# Patient Record
Sex: FEMALE | Race: OTHER | HISPANIC OR LATINO
[De-identification: names, ages, dates, MRNs, and addresses within clinical notes are randomized per-mention and may not be internally consistent; named-entity substitution may affect disease eponyms.]

---

## 2017-02-10 ENCOUNTER — APPOINTMENT (OUTPATIENT)
Dept: SURGERY | Facility: CLINIC | Age: 37
End: 2017-02-10

## 2017-02-10 ENCOUNTER — OUTPATIENT (OUTPATIENT)
Dept: OUTPATIENT SERVICES | Facility: HOSPITAL | Age: 37
LOS: 1 days | End: 2017-02-10
Payer: COMMERCIAL

## 2017-02-10 VITALS
TEMPERATURE: 97.5 F | OXYGEN SATURATION: 98 % | HEIGHT: 64.5 IN | BODY MASS INDEX: 30.52 KG/M2 | HEART RATE: 71 BPM | SYSTOLIC BLOOD PRESSURE: 99 MMHG | DIASTOLIC BLOOD PRESSURE: 63 MMHG | WEIGHT: 181 LBS

## 2017-02-10 DIAGNOSIS — Z98.89 OTHER SPECIFIED POSTPROCEDURAL STATES: Chronic | ICD-10-CM

## 2017-02-10 PROCEDURE — 71020: CPT | Mod: 26

## 2017-02-10 PROCEDURE — 71046 X-RAY EXAM CHEST 2 VIEWS: CPT

## 2017-02-16 ENCOUNTER — APPOINTMENT (OUTPATIENT)
Dept: SURGERY | Facility: HOSPITAL | Age: 37
End: 2017-02-16

## 2017-02-16 ENCOUNTER — INPATIENT (INPATIENT)
Facility: HOSPITAL | Age: 37
LOS: 0 days | Discharge: HOME | DRG: 355 | End: 2017-02-17
Attending: SURGERY | Admitting: SURGERY
Payer: COMMERCIAL

## 2017-02-16 VITALS
OXYGEN SATURATION: 100 % | DIASTOLIC BLOOD PRESSURE: 56 MMHG | HEIGHT: 64 IN | TEMPERATURE: 97 F | RESPIRATION RATE: 16 BRPM | WEIGHT: 182.98 LBS | HEART RATE: 73 BPM | SYSTOLIC BLOOD PRESSURE: 111 MMHG

## 2017-02-16 DIAGNOSIS — Z98.89 OTHER SPECIFIED POSTPROCEDURAL STATES: Chronic | ICD-10-CM

## 2017-02-16 PROCEDURE — 49652: CPT | Mod: GC

## 2017-02-16 PROCEDURE — S2900 ROBOTIC SURGICAL SYSTEM: CPT | Mod: NC

## 2017-02-16 RX ORDER — ONDANSETRON 8 MG/1
4 TABLET, FILM COATED ORAL EVERY 6 HOURS
Qty: 0 | Refills: 0 | Status: DISCONTINUED | OUTPATIENT
Start: 2017-02-17 | End: 2017-02-17

## 2017-02-16 RX ORDER — HYDROMORPHONE HYDROCHLORIDE 2 MG/ML
1 INJECTION INTRAMUSCULAR; INTRAVENOUS; SUBCUTANEOUS EVERY 4 HOURS
Qty: 0 | Refills: 0 | Status: DISCONTINUED | OUTPATIENT
Start: 2017-02-17 | End: 2017-02-17

## 2017-02-16 RX ORDER — SODIUM CHLORIDE 9 MG/ML
1000 INJECTION, SOLUTION INTRAVENOUS
Qty: 0 | Refills: 0 | Status: DISCONTINUED | OUTPATIENT
Start: 2017-02-16 | End: 2017-02-16

## 2017-02-16 RX ORDER — SODIUM CHLORIDE 9 MG/ML
1000 INJECTION, SOLUTION INTRAVENOUS
Qty: 0 | Refills: 0 | Status: DISCONTINUED | OUTPATIENT
Start: 2017-02-16 | End: 2017-02-17

## 2017-02-16 NOTE — ASU PREOP CHECKLIST - SELECT TESTS ORDERED
LMP-Jan.2017 pregnancy test- negative/CXR/BMP/CBC/INR/EKG CBC/BMP/INR/PT/PTT/CXR/EKG/LMP-Jan.2017 pregnancy test- negative

## 2017-02-16 NOTE — H&P ADULT. - HISTORY OF PRESENT ILLNESS
36F with recurrent umbilical hernia times two. Patient noticed recurrence after her recent pregnancy. She also desires a tubal ligation which will be done by GYN.

## 2017-02-17 ENCOUNTER — TRANSCRIPTION ENCOUNTER (OUTPATIENT)
Age: 37
End: 2017-02-17

## 2017-02-17 VITALS
OXYGEN SATURATION: 99 % | HEART RATE: 72 BPM | RESPIRATION RATE: 16 BRPM | SYSTOLIC BLOOD PRESSURE: 103 MMHG | DIASTOLIC BLOOD PRESSURE: 72 MMHG | TEMPERATURE: 97 F

## 2017-02-17 RX ORDER — OXYCODONE AND ACETAMINOPHEN 5; 325 MG/1; MG/1
1 TABLET ORAL
Qty: 15 | Refills: 0
Start: 2017-02-17

## 2017-02-17 RX ADMIN — HYDROMORPHONE HYDROCHLORIDE 1 MILLIGRAM(S): 2 INJECTION INTRAMUSCULAR; INTRAVENOUS; SUBCUTANEOUS at 06:34

## 2017-02-17 RX ADMIN — HYDROMORPHONE HYDROCHLORIDE 1 MILLIGRAM(S): 2 INJECTION INTRAMUSCULAR; INTRAVENOUS; SUBCUTANEOUS at 01:15

## 2017-02-17 RX ADMIN — SODIUM CHLORIDE 125 MILLILITER(S): 9 INJECTION, SOLUTION INTRAVENOUS at 06:27

## 2017-02-17 RX ADMIN — HYDROMORPHONE HYDROCHLORIDE 1 MILLIGRAM(S): 2 INJECTION INTRAMUSCULAR; INTRAVENOUS; SUBCUTANEOUS at 01:22

## 2017-02-17 RX ADMIN — HYDROMORPHONE HYDROCHLORIDE 1 MILLIGRAM(S): 2 INJECTION INTRAMUSCULAR; INTRAVENOUS; SUBCUTANEOUS at 07:03

## 2017-02-17 NOTE — BRIEF OPERATIVE NOTE - COMMENTS
Laparoscopic Tubal Ligation with the ligasure. No complications. Procedure followed by Laparoscopic umbilical hernia repair by Dr. Cee miller

## 2017-02-17 NOTE — DISCHARGE NOTE ADULT - CARE PLAN
Principal Discharge DX:	Hernia  Goal:	Recovery  Instructions for follow-up, activity and diet:	Follow up with  in 1 week. Call the office at  to schedule your appointment. You may shower; soap and water over incision sites. Do not scrub. Pat dry when done. No tub bathing or swimming until cleared. Keep incision sites out of the sun as scars will darken. No heavy lifting (>10lbs) or strenuous exercise.

## 2017-02-17 NOTE — DISCHARGE NOTE ADULT - HOSPITAL COURSE
36F with recurrent umbilical hernia times two. Patient noticed recurrence after her recent pregnancy. She also desires a tubal ligation which will be done by GYN. She underwent robotic umbilical hernia repair and b/l tubal ligation. She tolerated the procedure well. Her diet was advanced and she tolerated. She will be discharged home with follow up in the office with Dr Austin and her OBGYN.

## 2017-02-17 NOTE — PROGRESS NOTE ADULT - SUBJECTIVE AND OBJECTIVE BOX
Procedure: b/l tubal ligation, lap robotic ventral hernia repair w/ mesh    Diagnosis/Indication: ventral hernia     Surgeon: Geovanna    S: Pt has no complaints. Denies CP, SOB, BLAS, calf tenderness. Pain controlled with medication.    O:  T(C): 36.6, Max: 36.6 (02-17 @ 01:15)  T(F): 97.8, Max: 97.8 (02-17 @ 01:15)  HR: 75 (52 - 75)  BP: 109/75 (109/75 - 131/58)  RR: 16 (16 - 18)  SpO2: 97% (97% - 100%)  Wt(kg): --            Gen: NAD, resting comfortably in bed  C/V: NSR  Pulm: Nonlabored breathing, no respiratory distress  Abd: soft, NT/ND, no guarding or rebound, 3 lap sites CDI  Extrem: WWP, no calf edema, SCDs in place      A/P: 36yFemale s/p above procedure  Diet: CLD  IVF: lr@125  Pain/nausea control  DVT ppx: SCDs

## 2017-02-17 NOTE — DISCHARGE NOTE ADULT - PATIENT PORTAL LINK FT
“You can access the Follow[facility] Patient Portal, offered by [facility], by registering with the following website: [email or web address]”

## 2017-02-17 NOTE — PROGRESS NOTE ADULT - SUBJECTIVE AND OBJECTIVE BOX
O/N: POC WNL, passed TOV      SUBJECTIVE: Pt seen and examined at bedside. No overnight events.  Pain controlled. No f/c/n/v.     Vital Signs Last 24 Hrs  T(C): 36.9, Max: 36.9 (02-17 @ 06:25)  T(F): 98.5, Max: 98.5 (02-17 @ 06:25)  HR: 65 (52 - 75)  BP: 107/73 (93/60 - 131/58)  BP(mean): --  RR: 16 (16 - 18)  SpO2: 99% (97% - 100%)    PHYSICAL EXAM    Gen: NAD  Abd: Soft, NT/ND, incisions CDI    I&O's Detail    I & Os for current day (as of 17 Feb 2017 07:32)  =============================================  IN:    lactated ringers.: 1000 ml    Total IN: 1000 ml  ---------------------------------------------  OUT:    Voided: 500 ml    Total OUT: 500 ml  ---------------------------------------------  Total NET: 500 ml      LABS:                MEDICATIONS  (STANDING):  lactated ringers. 1000milliLiter(s) IV Continuous <Continuous>    MEDICATIONS  (PRN):  HYDROmorphone  Injectable 1milliGRAM(s) IV Push every 4 hours PRN Moderate Pain  ondansetron Injectable 4milliGRAM(s) IV Push every 6 hours PRN Nausea      RADIOLOGY & ADDITIONAL STUDIES:    ASSESSMENT AND PLAN

## 2017-02-17 NOTE — DISCHARGE NOTE ADULT - MEDICATION SUMMARY - MEDICATIONS TO TAKE
I will START or STAY ON the medications listed below when I get home from the hospital:    Percocet 5/325 oral tablet  -- 1 tab(s) by mouth every 6 hours, As Needed MDD:4  -- Caution federal law prohibits the transfer of this drug to any person other  than the person for whom it was prescribed.  May cause drowsiness.  Alcohol may intensify this effect.  Use care when operating dangerous machinery.  This prescription cannot be refilled.  This product contains acetaminophen.  Do not use  with any other product containing acetaminophen to prevent possible liver damage.  Using more of this medication than prescribed may cause serious breathing problems.    -- Indication: For Pain

## 2017-02-17 NOTE — DISCHARGE NOTE ADULT - PLAN OF CARE
Recovery Follow up with  in 1 week. Call the office at  to schedule your appointment. You may shower; soap and water over incision sites. Do not scrub. Pat dry when done. No tub bathing or swimming until cleared. Keep incision sites out of the sun as scars will darken. No heavy lifting (>10lbs) or strenuous exercise.

## 2017-02-21 ENCOUNTER — APPOINTMENT (OUTPATIENT)
Dept: BARIATRICS | Facility: CLINIC | Age: 37
End: 2017-02-21

## 2017-02-21 ENCOUNTER — APPOINTMENT (OUTPATIENT)
Dept: SURGERY | Facility: CLINIC | Age: 37
End: 2017-02-21

## 2017-02-21 VITALS
BODY MASS INDEX: 30.52 KG/M2 | SYSTOLIC BLOOD PRESSURE: 91 MMHG | HEART RATE: 62 BPM | HEIGHT: 64.5 IN | WEIGHT: 181 LBS | DIASTOLIC BLOOD PRESSURE: 60 MMHG | OXYGEN SATURATION: 98 %

## 2017-02-21 DIAGNOSIS — K43.9 VENTRAL HERNIA WITHOUT OBSTRUCTION OR GANGRENE: ICD-10-CM

## 2017-02-21 DIAGNOSIS — K42.9 UMBILICAL HERNIA WITHOUT OBSTRUCTION OR GANGRENE: ICD-10-CM

## 2017-02-21 DIAGNOSIS — Z30.2 ENCOUNTER FOR STERILIZATION: ICD-10-CM

## 2017-02-28 ENCOUNTER — APPOINTMENT (OUTPATIENT)
Dept: SURGERY | Facility: CLINIC | Age: 37
End: 2017-02-28

## 2017-02-28 VITALS
BODY MASS INDEX: 31.2 KG/M2 | OXYGEN SATURATION: 97 % | HEART RATE: 83 BPM | SYSTOLIC BLOOD PRESSURE: 107 MMHG | DIASTOLIC BLOOD PRESSURE: 67 MMHG | WEIGHT: 185 LBS | HEIGHT: 64.5 IN

## 2017-04-04 ENCOUNTER — APPOINTMENT (OUTPATIENT)
Dept: SURGERY | Facility: CLINIC | Age: 37
End: 2017-04-04

## 2017-04-04 VITALS
DIASTOLIC BLOOD PRESSURE: 68 MMHG | SYSTOLIC BLOOD PRESSURE: 100 MMHG | OXYGEN SATURATION: 98 % | HEIGHT: 64.5 IN | HEART RATE: 58 BPM | TEMPERATURE: 98 F | WEIGHT: 187 LBS | BODY MASS INDEX: 31.54 KG/M2

## 2017-04-04 DIAGNOSIS — Z87.19 OTHER SPECIFIED POSTPROCEDURAL STATES: ICD-10-CM

## 2017-04-04 DIAGNOSIS — K43.9 VENTRAL HERNIA W/OUT OBSTRUCTION OR GANGRENE: ICD-10-CM

## 2017-04-04 DIAGNOSIS — G89.18 OTHER ACUTE POSTPROCEDURAL PAIN: ICD-10-CM

## 2017-04-04 DIAGNOSIS — K42.9 UMBILICAL HERNIA W/OUT OBSTRUCTION OR GANGRENE: ICD-10-CM

## 2017-04-04 DIAGNOSIS — Z98.890 OTHER SPECIFIED POSTPROCEDURAL STATES: ICD-10-CM

## 2017-04-25 ENCOUNTER — APPOINTMENT (OUTPATIENT)
Dept: SURGERY | Facility: CLINIC | Age: 37
End: 2017-04-25

## 2017-06-06 ENCOUNTER — APPOINTMENT (OUTPATIENT)
Dept: SURGERY | Facility: CLINIC | Age: 37
End: 2017-06-06

## 2017-12-15 PROCEDURE — C1781: CPT

## 2017-12-15 PROCEDURE — S2900: CPT

## 2018-08-01 ENCOUNTER — APPOINTMENT (OUTPATIENT)
Dept: CT IMAGING | Facility: HOSPITAL | Age: 38
End: 2018-08-01

## 2018-08-01 ENCOUNTER — OUTPATIENT (OUTPATIENT)
Dept: OUTPATIENT SERVICES | Facility: HOSPITAL | Age: 38
LOS: 1 days | End: 2018-08-01
Payer: COMMERCIAL

## 2018-08-01 DIAGNOSIS — Z98.89 OTHER SPECIFIED POSTPROCEDURAL STATES: Chronic | ICD-10-CM

## 2018-08-01 PROCEDURE — 70450 CT HEAD/BRAIN W/O DYE: CPT | Mod: 26

## 2018-08-01 PROCEDURE — 70450 CT HEAD/BRAIN W/O DYE: CPT

## 2018-10-15 ENCOUNTER — APPOINTMENT (OUTPATIENT)
Dept: NEUROSURGERY | Facility: CLINIC | Age: 38
End: 2018-10-15

## 2018-10-29 ENCOUNTER — APPOINTMENT (OUTPATIENT)
Dept: NEUROSURGERY | Facility: CLINIC | Age: 38
End: 2018-10-29

## 2018-11-05 ENCOUNTER — APPOINTMENT (OUTPATIENT)
Dept: NEUROSURGERY | Facility: CLINIC | Age: 38
End: 2018-11-05
Payer: MEDICAID

## 2018-11-05 VITALS
HEIGHT: 64 IN | WEIGHT: 197 LBS | BODY MASS INDEX: 33.63 KG/M2 | OXYGEN SATURATION: 100 % | RESPIRATION RATE: 14 BRPM | SYSTOLIC BLOOD PRESSURE: 97 MMHG | HEART RATE: 72 BPM | DIASTOLIC BLOOD PRESSURE: 62 MMHG

## 2018-11-05 PROCEDURE — 99205 OFFICE O/P NEW HI 60 MIN: CPT

## 2019-04-01 ENCOUNTER — APPOINTMENT (OUTPATIENT)
Dept: MRI IMAGING | Facility: HOSPITAL | Age: 39
End: 2019-04-01
Payer: MEDICAID

## 2019-04-01 ENCOUNTER — OUTPATIENT (OUTPATIENT)
Dept: OUTPATIENT SERVICES | Facility: HOSPITAL | Age: 39
LOS: 1 days | End: 2019-04-01
Payer: COMMERCIAL

## 2019-04-01 DIAGNOSIS — Z98.89 OTHER SPECIFIED POSTPROCEDURAL STATES: Chronic | ICD-10-CM

## 2019-04-01 PROCEDURE — 72156 MRI NECK SPINE W/O & W/DYE: CPT

## 2019-04-01 PROCEDURE — 72156 MRI NECK SPINE W/O & W/DYE: CPT | Mod: 26

## 2019-04-01 PROCEDURE — 70553 MRI BRAIN STEM W/O & W/DYE: CPT | Mod: 26

## 2019-04-01 PROCEDURE — A9585: CPT

## 2019-04-01 PROCEDURE — 70553 MRI BRAIN STEM W/O & W/DYE: CPT

## 2019-04-08 ENCOUNTER — APPOINTMENT (OUTPATIENT)
Dept: NEUROSURGERY | Facility: CLINIC | Age: 39
End: 2019-04-08
Payer: MEDICAID

## 2019-04-08 VITALS
OXYGEN SATURATION: 98 % | BODY MASS INDEX: 31.58 KG/M2 | RESPIRATION RATE: 16 BRPM | WEIGHT: 185 LBS | HEIGHT: 64 IN | DIASTOLIC BLOOD PRESSURE: 70 MMHG | HEART RATE: 71 BPM | SYSTOLIC BLOOD PRESSURE: 109 MMHG

## 2019-04-08 PROCEDURE — 99215 OFFICE O/P EST HI 40 MIN: CPT

## 2019-04-08 NOTE — PHYSICAL EXAM
[General Appearance - Alert] : alert [General Appearance - In No Acute Distress] : in no acute distress [Oriented To Time, Place, And Person] : oriented to person, place, and time [Motor Tone] : muscle tone was normal in all four extremities [Motor Strength] : muscle strength was normal in all four extremities [Sclera] : the sclera and conjunctiva were normal [Outer Ear] : the ears and nose were normal in appearance [Neck Appearance] : the appearance of the neck was normal [] : no respiratory distress [Respiration, Rhythm And Depth] : normal respiratory rhythm and effort [Heart Rate And Rhythm] : heart rate was normal and rhythm regular [Abnormal Walk] : normal gait [Skin Color & Pigmentation] : normal skin color and pigmentation

## 2019-04-11 NOTE — ADDENDUM
[FreeTextEntry1] : 	2019 \par  \par  \par  \par Re:	Kimberly Luis  \par :	84 \par MRN: 	[id number] \par  \par  \par Ms. Luis is a 38-year-old woman who presents for followup regarding her Chiari malformation.  Since she was last seen, she has obtained a brain and cervical spine MRI, which indeed confirms a significant Chiari malformation radiographically.  She has about 2 cm of tonsillar descent past the foramen magnum with some effacement and mass effect at the dorsal cervicomedullary junction.   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  Otherwise, she is neurologically nonfocal. \par  \par We had a long discussion regarding her symptomatic Chiari malformation.  We discussed the risks, benefits, and alternatives to suboccipital decompression, C1 laminectomy, and duraplasty.  Risks, including, but not limited to infection, bleeding, luisito for reoperation, CSF leak, continued symptoms, neurological deficit, and other medical/surgical/anesthetic complications were discussed.  The patient understands and wishes to consider her options prior to moving forward with surgery.  She will call the office to let us know if she would like to proceed with surgery. \par  \par  \par  \par  \par Estuardo Price MD \par  \par [facility] #[id number] \par

## 2019-04-11 NOTE — ASSESSMENT
[FreeTextEntry1] : MRI Cervical spine and MRI brain with CSF flow study reviewed by Dr. Price with patient, which demonstrates evidence of chiari malformation and small left arachnoid cyst. CSF flow study normal.\par Discussed headaches in detail, particularly tussive headaches - consistent with symptoms of Chiari malformation.\par Discussed surgical intervention, Chiari decompression surgery at length with patient. Risks, benefits and alternatives to surgery discussed in detail.\par Multiple questions answered to patient's satisfaction.\par She would like to consider surgery as an option and will return when she is ready to proceed with her .\par Education packet provided to patient regarding information about surgery.\par Contact information provided if patient has any further questions or concerns.\par \par Patient verbalizes agreement and understanding with plan.

## 2019-04-11 NOTE — DATA REVIEWED
[de-identified] : \par EXAM: MR SPINE CERVICAL WAW IC \par \par PROCEDURE DATE: 04/01/2019 \par \par \par \par INTERPRETATION: MRI CERVICAL SPINE WITH CONTRAST \par \par INDICATION: Chiari I malformation. \par \par TECHNIQUE: Sagittal T1, sagittal T2, sagittal inversion recovery, axial T2, \par and axial gradient echo, and postcontrast axial T1 and sagittal T1 with fat \par suppression images of the cervical spine were obtained. 7.5 cc Gadavist was \par given, and none was discarded. \par \par COMPARISON: Brain MR from 4/1/2019. \par \par FINDINGS: \par \par Cerebellar tonsils are ectopic extending 1.8 cm below a line drawn from the \par basion to the opisthion consistent with a Chiari I malformation, and the \par cerebellar tonsils demonstrate a slightly pegged configuration. The inferior \par extent of the tonsillar herniation is to approximately the mid C2 level. \par There is no abnormal T2 prolongation within the intramedullary intradural \par cervical spinal cord to suggest syrinx. \par \par There is slight loss of the normal cervical lordosis. There is no \par prevertebral soft tissue swelling or splaying of the spinous processes. \par There is loss of normal disc space signal on the long TR scans consistent \par with desiccation and degenerative changes within the intervertebral disc \par space compartment particularly in the mid cervical spine. There is minor \par degenerative change including marginal endplate osteophytes, particularly at \par the C5-C6 level anteriorly, and minor uncovertebral spurring in the mid and \par lower cervical spine. \par \par At the C2-C3 level, no disc herniation, foraminal stenosis, or central canal \par stenosis is seen. \par \par At the C3-C4 level, no disc herniation, foraminal stenosis, or central canal \par stenosis is seen. \par \par At the C4-C5 level, no disc herniation, foraminal stenosis, or central canal \par stenosis is seen. \par \par At the C5-C6 level, tiny central disc herniation is present. There is no \par significant foraminal stenosis or central canal stenosis. \par \par At the C6-C7 level, no disc herniation, foraminal stenosis, or central canal \par stenosis is seen. \par \par At the C7-T1 level, no disc herniation, foraminal stenosis, or central canal \par stenosis is seen. \par \par With the administration of contrast, no abnormal enhancement is seen. \par \par IMPRESSION: \par \par 1. Findings consistent with Chiari I malformation with cerebellar tonsils \par extending inferiorly to mid C2 level. No syrinx. \par 2. Tiny central disc herniation at C5-C6 level. \par 3. No significant foraminal stenosis or central canal stenosis. \par \par \par \par \par \par "Thank you for the opportunity to participate in the care of this patient." \par \par \par \par VERONICA VILLASENOR M.D., ATTENDING RADIOLOGIST \par This document has been electronically signed. Apr 2 2019 1:42PM \par \par \par \par \par \par \par \par    \par \par \par \par \par \par \par \par \par \par \par \par \par \par \par \par      \par \par EXAM: MR BRAIN WAW IC \par \par PROCEDURE DATE: 04/01/2019 \par \par \par \par INTERPRETATION: INDICATIONS: Chiari I malformation and arachnoid cysts. \par \par TECHNIQUE: \par \par Multiplanar MR images of the brain were obtained using sagittal T1, axial \par T1, T2, FLAIR, susceptibility weighted, and diffusion weighted imaging. \par After contrast administration, axial, coronal coronal and sagittal \par T1-weighted images were also obtained. 7.5 cc was given, and none was \par discarded. In addition, cine CSF flow study was also performed of the \par craniovertebral junction \par \par COMPARISON EXAMINATION: CT of the head from 8/1/2018. \par \par FINDINGS: \par \par Cerebellar tonsils are ectopic extending 1.8 cm from a line drawn from the \par basion to the opisthion consistent with a Chiari I malformation demonstrates \par slightly peg configuration. The inferior extent of the tonsillar herniation \par is to the approximate mid C2 level. CSF cine flow study demonstrates normal \par pulsatility of CSF at the craniovertebral junction in the area of the Chiari \par I malformation. \par \par The lateral ventricles are unremarkable in size and configuration. There is \par no hydrocephalus. Again identified is a small arachnoid cyst along the \par anteroinferior left middle cranial fossa, which is difficult to measure \par given its conformity to the anteroinferior lef middle cranial fossa but \par approximate measurements are 0.9 cm in width x 3.0 cm in length. \par \par There is no abnormal T2 prolongation within the periventricular or \par subcortical white matter. No parenchymal mass, extra-axial fluid collection, \par midline shift, or focal mass effect is present. There is no acute infarct on \par the diffusion-weighted imaging. No abnormal susceptibility foci are seen in \par gradient echo. No air-fluid levels are seen in the paranasal sinuses. \par Mastoid air cells demonstrate good aeration. Visualized vascular flow-voids \par are unremarkable. \par \par There is no abnormal enhancement postcontrast. \par \par IMPRESSION: \par \par 1. Findings consistent with Chiari I malformation with cerebellar tonsils \par extending inferiorly to mid C2 level and CSF flow study demonstrating normal \par pulsatility at the craniovertebral junction. \par 2. Small left middle cranial fossa arachnoid cyst anteroinferiorly. \par \par \par \par \par \par "Thank you for the opportunity to participate in the care of this patient." \par \par MATT GLASS M.D., RADIOLOGY RESIDENT \par This document has been electronically signed. \par VERONICA VILLASENOR M.D., ATTENDING RADIOLOGIST \par This document has been electronically signed. Apr 2 2019 11:08AM \par

## 2019-04-11 NOTE — REASON FOR VISIT
[Follow-Up: _____] : a [unfilled] follow-up visit [FreeTextEntry1] : Returns with new imaging and discussion of treatment recommendations. \par \par She continues to report daily occipital headaches. She states headaches are constant and vary in severity. Reports they are most severe when she lowers her head to the ground, with coughing, sneezing and while singing at her [employer]. \par She takes ibuprofen prn with minimal relief of headaches. \par She denies numbness/tingling in extremities. Denies imbalance and difficulty walking. \par \par She had MRI cervical spine and MRI brain with CSF flow study done on 4/1/19 which she brings for review.

## 2019-10-07 ENCOUNTER — APPOINTMENT (OUTPATIENT)
Dept: NEUROSURGERY | Facility: CLINIC | Age: 39
End: 2019-10-07
Payer: MEDICAID

## 2019-10-14 ENCOUNTER — APPOINTMENT (OUTPATIENT)
Dept: NEUROSURGERY | Facility: CLINIC | Age: 39
End: 2019-10-14
Payer: MEDICAID

## 2019-10-14 VITALS
WEIGHT: 185 LBS | HEIGHT: 64 IN | BODY MASS INDEX: 31.58 KG/M2 | RESPIRATION RATE: 18 BRPM | SYSTOLIC BLOOD PRESSURE: 106 MMHG | OXYGEN SATURATION: 99 % | HEART RATE: 76 BPM | DIASTOLIC BLOOD PRESSURE: 70 MMHG

## 2019-10-14 DIAGNOSIS — R51 HEADACHE: ICD-10-CM

## 2019-10-14 PROCEDURE — 99215 OFFICE O/P EST HI 40 MIN: CPT

## 2019-10-14 NOTE — PHYSICAL EXAM
[General Appearance - Alert] : alert [General Appearance - In No Acute Distress] : in no acute distress [Oriented To Time, Place, And Person] : oriented to person, place, and time [Cranial Nerves Optic (II)] : visual acuity intact bilaterally,  pupils equal round and reactive to light [Cranial Nerves Oculomotor (III)] : extraocular motion intact [Cranial Nerves Trigeminal (V)] : facial sensation intact symmetrically [Cranial Nerves Facial (VII)] : face symmetrical [Cranial Nerves Vestibulocochlear (VIII)] : hearing was intact bilaterally [Cranial Nerves Accessory (XI - Cranial And Spinal)] : head turning and shoulder shrug symmetric [Cranial Nerves Glossopharyngeal (IX)] : tongue and palate midline [Cranial Nerves Hypoglossal (XII)] : there was no tongue deviation with protrusion [Motor Tone] : muscle tone was normal in all four extremities [Motor Strength] : muscle strength was normal in all four extremities [Sclera] : the sclera and conjunctiva were normal [Outer Ear] : the ears and nose were normal in appearance [Neck Appearance] : the appearance of the neck was normal [Respiration, Rhythm And Depth] : normal respiratory rhythm and effort [] : no respiratory distress [Heart Rate And Rhythm] : heart rate was normal and rhythm regular [Abnormal Walk] : normal gait [Skin Color & Pigmentation] : normal skin color and pigmentation

## 2019-10-18 ENCOUNTER — OUTPATIENT (OUTPATIENT)
Dept: OUTPATIENT SERVICES | Facility: HOSPITAL | Age: 39
LOS: 1 days | End: 2019-10-18
Payer: COMMERCIAL

## 2019-10-18 DIAGNOSIS — Z98.89 OTHER SPECIFIED POSTPROCEDURAL STATES: Chronic | ICD-10-CM

## 2019-10-18 DIAGNOSIS — G93.0 CEREBRAL CYSTS: ICD-10-CM

## 2019-10-18 PROCEDURE — 87641 MR-STAPH DNA AMP PROBE: CPT

## 2019-10-19 LAB
MRSA PCR RESULT.: NEGATIVE — SIGNIFICANT CHANGE UP
S AUREUS DNA NOSE QL NAA+PROBE: NEGATIVE — SIGNIFICANT CHANGE UP

## 2019-10-22 NOTE — HISTORY OF PRESENT ILLNESS
[de-identified] : 37 year old woman with no pertinent past medical history who was found to have possible Chiari malformation and small arachnoid cyst during workup for headaches.\par \par She presents today for review of imaging and treatment recommendations.\par Reports she has headaches most days of the week. Denies changes in headaches with position changes, bending or bearing down. Denies visual changes, nausea, vomiting, dizziness.

## 2019-10-22 NOTE — ADDENDUM
[FreeTextEntry1] : 2019 \par  \par OFFICE FOLLOWUP NOTE \par  \par  \par Re:	Kimberly Luis  \par :	84 \par MRN:  [id number] \par  \par Ms. Luis is a 38-year-old woman who presents for followup regarding her symptomatic Chiari malformation. Ms. Luis is symptomatic with tussive headaches, headaches related to position which are worse when she is recumbent and headaches related to physical activity and anything that involves Valsalva like maneuvers. Ms. Luis indicates that her headaches have been getting worse and have in fact significantly worsened since I last saw her. \par  \par In reviewing her MRI of the brain, again there is noted to be approximately 2 cm of cerebellar tonsil descent below the foramen magnum. This creates impingement on the dorsal brainstem, as well as some obstruction of CSF pathways exiting the posterior fossa. I discussed with Ms. Luis and her  that posterior fossa decompression, including suboccipital craniectomy as well as C1 laminectomy and duraplasty is a reasonable strategy to address the posterior fossa crowding and obstruction resulting in symptoms from the Chiari malformation. I discussed the risks of proceeding with surgical intervention, including CSF leak, infection, need for reoperation, failure to improve clinical symptoms, death and other medical/surgical anesthetic complication.  \par  \par Ms. Luis and her  would like to further discuss their options prior to making a decision. I also discussed continued conservative management, and the decision to proceed with surgery is certainly, at this point, a lifestyle decision that Ms. Luis will have to make. I see no urgency to proceed with surgery. However, I think that this is a reasonable way to address the symptoms and the primary pathology. She will let us know if she would like to proceed with surgery. \par  \par  \par  \par  \par  \par Estuardo Price M.D. \par  of Neurosurgery \par [facility] [employer] [facility] \[facility] Department of Neurosurgery \par [facility] \par [address] \par [facility], [address] Floor \[place] [address] \par Tel: [phone] \par Email:  [email or web address] \par  \par  \par [facility] #[id number] \par  \par  \par

## 2019-10-22 NOTE — ASSESSMENT
[FreeTextEntry1] : MRI brain with CSF flow study from 4/2019 reviewed by Dr. Price with patient and patient's  in detail.\par Discussed surgical options for treatment of symptomatic Chiari Malformation. \par Risks, benefits and alternatives to surgery discussed in language the patient understands and an understanding was verbalized.\par Multiple questions answered to patient's satisfaction.\par She would like to proceed with surgery but is unsure of date.\par PST packet reviewed and given to patient.\par MRSA swab completed.\par Ms. Luis will call me when she is certain of date - will likely schedule within the next month.\par Education provided regarding plan of care.\par Contact information provided for patient to contact me when ready to proceed and if any further questions/concerns.\par \par Agreement and understanding verbalized.

## 2019-10-22 NOTE — REASON FOR VISIT
[FreeTextEntry1] : \par TODAY'S VISIT:\par Returns to discuss suboccipital decompression for Chiari Malformation.\par She states her headaches are becoming worse. She states headaches occur daily in the front and back of her head. She states headaches are brought on by sneezing, laughing, coughing, exercising.\par Reports some mild gait disturbance.\par Denies focal weakness, numbness/tingling in extremities.\par \par PREVIOUS OFFICE VISIT 4/8/19:\par Returns with new imaging and discussion of treatment recommendations. \par \par She continues to report daily occipital headaches. She states headaches are constant and vary in severity. Reports they are most severe when she lowers her head to the ground, with coughing, sneezing and while singing at her [employer]. \par She takes ibuprofen prn with minimal relief of headaches. \par She denies numbness/tingling in extremities. Denies imbalance and difficulty walking. \par \par She had MRI cervical spine and MRI brain with CSF flow study done on 4/1/19 which she brings for review.

## 2019-12-09 ENCOUNTER — APPOINTMENT (OUTPATIENT)
Dept: NEUROSURGERY | Facility: CLINIC | Age: 39
End: 2019-12-09

## 2020-11-30 ENCOUNTER — APPOINTMENT (OUTPATIENT)
Dept: NEUROSURGERY | Facility: CLINIC | Age: 40
End: 2020-11-30
Payer: MEDICAID

## 2020-12-07 ENCOUNTER — NON-APPOINTMENT (OUTPATIENT)
Age: 40
End: 2020-12-07

## 2020-12-11 ENCOUNTER — TRANSCRIPTION ENCOUNTER (OUTPATIENT)
Age: 40
End: 2020-12-11

## 2020-12-11 ENCOUNTER — APPOINTMENT (OUTPATIENT)
Dept: NEUROSURGERY | Facility: CLINIC | Age: 40
End: 2020-12-11
Payer: MEDICAID

## 2020-12-11 VITALS
SYSTOLIC BLOOD PRESSURE: 114 MMHG | DIASTOLIC BLOOD PRESSURE: 72 MMHG | BODY MASS INDEX: 35.85 KG/M2 | OXYGEN SATURATION: 98 % | WEIGHT: 210 LBS | RESPIRATION RATE: 18 BRPM | HEIGHT: 64 IN | HEART RATE: 68 BPM | TEMPERATURE: 98.2 F

## 2020-12-11 PROCEDURE — 99215 OFFICE O/P EST HI 40 MIN: CPT

## 2020-12-11 PROCEDURE — 99072 ADDL SUPL MATRL&STAF TM PHE: CPT

## 2020-12-11 RX ORDER — IBUPROFEN 600 MG/1
600 TABLET, FILM COATED ORAL 4 TIMES DAILY
Qty: 30 | Refills: 0 | Status: DISCONTINUED | COMMUNITY
Start: 2017-04-04 | End: 2020-12-11

## 2020-12-11 NOTE — DATA REVIEWED
[de-identified] : MR BRAIN WAW IC                      \par \par PROCEDURE DATE:  04/01/2019  \par \par \par \par INTERPRETATION:  INDICATIONS:  Chiari I malformation and arachnoid cysts.\par \par TECHNIQUE:  \par \par Multiplanar MR images of the brain were obtained using sagittal T1, axial \par T1, T2, FLAIR, susceptibility weighted, and diffusion weighted imaging. \par After contrast administration, axial, coronal coronal and sagittal \par T1-weighted images were also obtained. 7.5 cc was given, and none was \par discarded. In addition, cine CSF flow study was also performed of the \par craniovertebral junction\par \par COMPARISON EXAMINATION: CT of the head from 8/1/2018.\par \par FINDINGS: \par \par Cerebellar tonsils are ectopic extending 1.8 cm from a line drawn from \par the basion to the opisthion consistent with a Chiari I malformation \par demonstrates slightly peg configuration. The inferior extent of the \par tonsillar herniation is to the approximate mid C2 level. CSF cine flow \par study demonstrates normal pulsatility of CSF at the craniovertebral \par junction in the area of the Chiari I malformation.\par \par The lateral ventricles are unremarkable in size and configuration. There \par is no hydrocephalus. Again identified is a small arachnoid cyst along the \par anteroinferior left middle cranial fossa, which is difficult to measure \par given its conformity to the anteroinferior lef middle cranial fossa but \par approximate measurements are 0.9 cm in width x 3.0 cm in length.\par \par There is no abnormal T2 prolongation within the periventricular or \par subcortical white matter. No parenchymal mass, extra-axial fluid \par collection, midline shift, or focal mass effect is present. There is no \par acute infarct on the diffusion-weighted imaging. No abnormal \par susceptibility foci are seen in gradient echo. No air-fluid levels are \par seen in the paranasal sinuses. Mastoid air cells demonstrate good \par aeration. Visualized vascular flow-voids are unremarkable.\par \par There is no abnormal enhancement postcontrast.\par \par IMPRESSION: \par \par 1. Findings consistent with Chiari I malformation with cerebellar tonsils \par extending inferiorly to mid C2 level and CSF flow study demonstrating \par normal pulsatility at the craniovertebral junction.\par 2. Small left middle cranial fossa arachnoid cyst anteroinferiorly.\par \par \par \par \par \par MR SPINE CERVICAL WAW IC                      \par \par PROCEDURE DATE:  04/01/2019  \par \par \par \par INTERPRETATION:  MRI CERVICAL SPINE WITH CONTRAST\par \par INDICATION: Chiari I malformation.\par \par TECHNIQUE: Sagittal T1, sagittal T2, sagittal inversion recovery, axial \par T2, and axial gradient echo, and postcontrast axial T1 and sagittal T1 \par with fat suppression images of the cervical spine were obtained. 7.5 cc \par Gadavist was given, and none was discarded.\par \par COMPARISON: Brain MR from 4/1/2019.\par \par FINDINGS:\par \par Cerebellar tonsils are ectopic extending 1.8 cm below a line drawn from \par the\par basion to the opisthion consistent with a Chiari I malformation, and the \par cerebellar tonsils demonstrate a slightly pegged configuration. The \par inferior extent of the tonsillar herniation is to approximately the mid \par C2 level. There is no abnormal T2 prolongation within the intramedullary \par intradural cervical spinal cord to suggest syrinx.\par \par There is slight loss of the normal cervical lordosis. There is no \par prevertebral soft tissue swelling or splaying of the spinous processes. \par There is loss of normal disc space signal on the long TR scans consistent \par with desiccation and degenerative changes within the intervertebral disc \par space compartment particularly in the mid cervical spine. There is minor \par degenerative change including marginal endplate osteophytes, particularly \par at the C5-C6 level anteriorly, and minor uncovertebral spurring in the \par mid and lower cervical spine.\par \par At the C2-C3 level, no disc herniation, foraminal stenosis, or central \par canal stenosis is seen. \par \par At the C3-C4 level, no disc herniation, foraminal stenosis, or central \par canal stenosis is seen. \par \par At the C4-C5 level, no disc herniation, foraminal stenosis, or central \par canal stenosis is seen. \par \par At the C5-C6 level, tiny central disc herniation is present. There is no \par significant foraminal stenosis or central canal stenosis.\par \par At the C6-C7 level, no disc herniation, foraminal stenosis, or central \par canal stenosis is seen. \par \par At the C7-T1 level, no disc herniation, foraminal stenosis, or central \par canal stenosis is seen. \par \par With the administration of contrast, no abnormal enhancement is seen.\par \par IMPRESSION:\par \par 1. Findings consistent with Chiari I malformation with cerebellar tonsils \par extending inferiorly to mid C2 level. No syrinx.\par 2. Tiny central disc herniation at C5-C6 level.\par 3. No significant foraminal stenosis or central canal stenosis.\par \par \par \par \par \par "Thank you for the opportunity to participate in the care of this \par patient."\par \par \par \par VERONICA VILLASENOR M.D., ATTENDING RADIOLOGIST\par This document has been electronically signed. Apr 2 2019  1:42PM\par   \par \par   \par \par \par \par "Thank you for the opportunity to participate in the care of this \par patient."\par \par MATT GLASS M.D., RADIOLOGY RESIDENT\par This document has been electronically signed.\par VERONICA VILLASENOR M.D., ATTENDING RADIOLOGIST\par This document has been electronically signed. Apr 2 2019 11:08AM\par   \par \par   \par \par

## 2020-12-11 NOTE — HISTORY OF PRESENT ILLNESS
[FreeTextEntry1] : 37 year old woman with no pertinent past medical history who was found to have possible Chiari malformation and small arachnoid cyst during workup for headaches.\par \par She presents today for review of imaging and treatment recommendations.\par Reports she has headaches most days of the week. Denies changes in headaches with position changes, bending or bearing down. Denies visual changes, nausea, vomiting, dizziness. \par

## 2020-12-11 NOTE — REASON FOR VISIT
[FreeTextEntry1] : \par TODAY'S VISIT:\par Returns to discuss suboccipital decompression for Chiari Malformation, she had previously cancelled planned surgery.\par \par She returns to discuss surgery further. \par She continues to report daily headaches associated with vasalva, sneezing, laughing, coughing. She describes intense pain that occurs with these activities. Headaches unrelieved by tylenol. She denies decreased visual acuity. \par \par Last MRI was done 4/2019.\par \par PREVIOUS OFFICE VISIT 4/8/19:\par Returns with new imaging and discussion of treatment recommendations. \par \par She continues to report daily occipital headaches. She states headaches are constant and vary in severity. Reports they are most severe when she lowers her head to the ground, with coughing, sneezing and while singing at her [employer]. \par She takes ibuprofen prn with minimal relief of headaches. \par She denies numbness/tingling in extremities. Denies imbalance and difficulty walking. \par \par She had MRI cervical spine and MRI brain with CSF flow study done on 4/1/19 which she brings for review. \par

## 2020-12-11 NOTE — PHYSICAL EXAM
[General Appearance - Alert] : alert [General Appearance - In No Acute Distress] : in no acute distress [Oriented To Time, Place, And Person] : oriented to person, place, and time [Cranial Nerves Optic (II)] : visual acuity intact bilaterally,  pupils equal round and reactive to light [Cranial Nerves Oculomotor (III)] : extraocular motion intact [Cranial Nerves Trigeminal (V)] : facial sensation intact symmetrically [Cranial Nerves Facial (VII)] : face symmetrical [Cranial Nerves Vestibulocochlear (VIII)] : hearing was intact bilaterally [Cranial Nerves Glossopharyngeal (IX)] : tongue and palate midline [Cranial Nerves Accessory (XI - Cranial And Spinal)] : head turning and shoulder shrug symmetric [Cranial Nerves Hypoglossal (XII)] : there was no tongue deviation with protrusion [Motor Tone] : muscle tone was normal in all four extremities [Motor Strength] : muscle strength was normal in all four extremities [Sclera] : the sclera and conjunctiva were normal [Outer Ear] : the ears and nose were normal in appearance [Neck Appearance] : the appearance of the neck was normal [] : no respiratory distress [Respiration, Rhythm And Depth] : normal respiratory rhythm and effort [Heart Rate And Rhythm] : heart rate was normal and rhythm regular [Abnormal Walk] : normal gait [Skin Color & Pigmentation] : normal skin color and pigmentation

## 2020-12-11 NOTE — ASSESSMENT
[FreeTextEntry1] : MRI brain with and without contrast done on 4/1/19 demonstrates Chiari I malformation and normal CSF flow study.\par Discussed posterior fossa decompression, including suboccipital craniectomy as well as C1 laminectomy and duraplasty.\par Risks, benefits and alternatives to surgery discussed in detail with patient.\par Multiple questions answered to patient's satisfaction.\par Will need new MRI brain without contrast prior to surgery.\par She would like to schedule surgery in February/March 2021.\par \par Will obtain MRI brain without contrast. After MRI brain complete, return to the office to discuss surgery and schedule date for February/March 2021.\par \par Patient and patient's family verbalize agreement and understanding with plan of care.\par

## 2020-12-15 ENCOUNTER — NON-APPOINTMENT (OUTPATIENT)
Age: 40
End: 2020-12-15

## 2020-12-16 ENCOUNTER — APPOINTMENT (OUTPATIENT)
Dept: BREAST CENTER | Facility: CLINIC | Age: 40
End: 2020-12-16
Payer: MEDICAID

## 2020-12-16 VITALS — HEART RATE: 74 BPM | HEIGHT: 64 IN | BODY MASS INDEX: 35.85 KG/M2 | WEIGHT: 210 LBS | OXYGEN SATURATION: 98 %

## 2020-12-16 PROCEDURE — 99204 OFFICE O/P NEW MOD 45 MIN: CPT

## 2020-12-16 PROCEDURE — 99072 ADDL SUPL MATRL&STAF TM PHE: CPT

## 2021-01-06 ENCOUNTER — NON-APPOINTMENT (OUTPATIENT)
Age: 41
End: 2021-01-06

## 2021-02-02 ENCOUNTER — NON-APPOINTMENT (OUTPATIENT)
Age: 41
End: 2021-02-02

## 2021-02-26 ENCOUNTER — NON-APPOINTMENT (OUTPATIENT)
Age: 41
End: 2021-02-26

## 2021-03-02 ENCOUNTER — RESULT REVIEW (OUTPATIENT)
Age: 41
End: 2021-03-02

## 2021-03-02 ENCOUNTER — APPOINTMENT (OUTPATIENT)
Dept: ULTRASOUND IMAGING | Facility: HOSPITAL | Age: 41
End: 2021-03-02
Payer: MEDICAID

## 2021-03-02 ENCOUNTER — OUTPATIENT (OUTPATIENT)
Dept: OUTPATIENT SERVICES | Facility: HOSPITAL | Age: 41
LOS: 1 days | End: 2021-03-02
Payer: COMMERCIAL

## 2021-03-02 DIAGNOSIS — Z98.89 OTHER SPECIFIED POSTPROCEDURAL STATES: Chronic | ICD-10-CM

## 2021-03-02 PROCEDURE — C1739: CPT

## 2021-03-02 PROCEDURE — 77065 DX MAMMO INCL CAD UNI: CPT

## 2021-03-02 PROCEDURE — 19285 PERQ DEV BREAST 1ST US IMAG: CPT | Mod: LT

## 2021-03-02 PROCEDURE — 19285 PERQ DEV BREAST 1ST US IMAG: CPT

## 2021-03-02 PROCEDURE — 77065 DX MAMMO INCL CAD UNI: CPT | Mod: 26,LT

## 2021-03-05 ENCOUNTER — APPOINTMENT (OUTPATIENT)
Dept: BREAST CENTER | Facility: CLINIC | Age: 41
End: 2021-03-05

## 2021-03-05 DIAGNOSIS — R92.8 OTHER ABNORMAL AND INCONCLUSIVE FINDINGS ON DIAGNOSTIC IMAGING OF BREAST: ICD-10-CM

## 2021-03-05 DIAGNOSIS — N63.23 UNSPECIFIED LUMP IN THE LEFT BREAST, LOWER OUTER QUADRANT: ICD-10-CM

## 2021-03-15 ENCOUNTER — APPOINTMENT (OUTPATIENT)
Dept: BREAST CENTER | Facility: CLINIC | Age: 41
End: 2021-03-15

## 2021-03-16 ENCOUNTER — APPOINTMENT (OUTPATIENT)
Dept: MRI IMAGING | Facility: HOSPITAL | Age: 41
End: 2021-03-16

## 2021-03-18 ENCOUNTER — TRANSCRIPTION ENCOUNTER (OUTPATIENT)
Age: 41
End: 2021-03-18

## 2021-03-19 ENCOUNTER — RESULT REVIEW (OUTPATIENT)
Age: 41
End: 2021-03-19

## 2021-03-19 ENCOUNTER — APPOINTMENT (OUTPATIENT)
Dept: BREAST CENTER | Facility: CLINIC | Age: 41
End: 2021-03-19
Payer: MEDICAID

## 2021-03-19 ENCOUNTER — OUTPATIENT (OUTPATIENT)
Dept: OUTPATIENT SERVICES | Facility: HOSPITAL | Age: 41
LOS: 1 days | Discharge: HOME | End: 2021-03-19
Payer: MEDICAID

## 2021-03-19 DIAGNOSIS — Z98.89 OTHER SPECIFIED POSTPROCEDURAL STATES: Chronic | ICD-10-CM

## 2021-03-19 PROCEDURE — 76098 X-RAY EXAM SURGICAL SPECIMEN: CPT | Mod: 26

## 2021-03-19 PROCEDURE — 19125 EXCISION BREAST LESION: CPT | Mod: LT

## 2021-03-19 PROCEDURE — 88307 TISSUE EXAM BY PATHOLOGIST: CPT | Mod: 26

## 2021-03-22 NOTE — BRIEF OPERATIVE NOTE - OPERATION/FINDINGS
L breast lesion identified using saviscout to localize saviclip. Skin incision, dissected down and around saviclip. Lesion excised. Deep dermal and subcuticular running monocryl to close.

## 2021-03-23 LAB — SURGICAL PATHOLOGY STUDY: SIGNIFICANT CHANGE UP

## 2021-03-29 ENCOUNTER — NON-APPOINTMENT (OUTPATIENT)
Age: 41
End: 2021-03-29

## 2021-03-31 ENCOUNTER — NON-APPOINTMENT (OUTPATIENT)
Age: 41
End: 2021-03-31

## 2021-04-01 ENCOUNTER — APPOINTMENT (OUTPATIENT)
Dept: BREAST CENTER | Facility: CLINIC | Age: 41
End: 2021-04-01
Payer: MEDICAID

## 2021-04-01 PROCEDURE — 99024 POSTOP FOLLOW-UP VISIT: CPT

## 2021-04-02 NOTE — PAST MEDICAL HISTORY
[Menstruating] : The patient is menstruating [Menarche Age ____] : age at menarche was [unfilled] [Definite ___ (Date)] : the last menstrual period was [unfilled] [Regular Cycle Intervals] : have been regular [Total Preg ___] : G[unfilled] [Live Births ___] : P[unfilled]  [Living ___] : Living: [unfilled] [History of Hormone Replacement Treatment] : has no history of hormone replacement treatment [FreeTextEntry5] : Tubal ligation [FreeTextEntry6] : None [FreeTextEntry7] : She used for a few years in the past [FreeTextEntry8] : She breast fed all 3 children

## 2021-04-02 NOTE — REVIEW OF SYSTEMS
[As Noted in HPI] : as noted in HPI [Negative] : Heme/Lymph [FreeTextEntry2] : headaches from budd chiari malformation

## 2021-04-27 ENCOUNTER — NON-APPOINTMENT (OUTPATIENT)
Age: 41
End: 2021-04-27

## 2021-05-06 NOTE — BRIEF OPERATIVE NOTE - PRIMARY SURGEON
----- Message from George Hale MD sent at 5/6/2021 12:10 PM CDT -----  Please notify the patient of normal results.  Follow-up as planned.   Geovanna

## 2021-06-03 ENCOUNTER — NON-APPOINTMENT (OUTPATIENT)
Age: 41
End: 2021-06-03

## 2021-06-04 ENCOUNTER — APPOINTMENT (OUTPATIENT)
Dept: GYNECOLOGIC ONCOLOGY | Facility: CLINIC | Age: 41
End: 2021-06-04
Payer: MEDICAID

## 2021-06-04 VITALS
TEMPERATURE: 96.8 F | OXYGEN SATURATION: 98 % | WEIGHT: 215 LBS | HEIGHT: 64 IN | DIASTOLIC BLOOD PRESSURE: 79 MMHG | SYSTOLIC BLOOD PRESSURE: 123 MMHG | BODY MASS INDEX: 36.7 KG/M2 | HEART RATE: 78 BPM

## 2021-06-04 DIAGNOSIS — N92.3 OVULATION BLEEDING: ICD-10-CM

## 2021-06-04 PROCEDURE — 99385 PREV VISIT NEW AGE 18-39: CPT

## 2021-06-14 ENCOUNTER — NON-APPOINTMENT (OUTPATIENT)
Age: 41
End: 2021-06-14

## 2021-06-18 ENCOUNTER — NON-APPOINTMENT (OUTPATIENT)
Age: 41
End: 2021-06-18

## 2021-06-21 ENCOUNTER — APPOINTMENT (OUTPATIENT)
Dept: BREAST CENTER | Facility: CLINIC | Age: 41
End: 2021-06-21
Payer: MEDICAID

## 2021-06-21 ENCOUNTER — APPOINTMENT (OUTPATIENT)
Dept: BREAST CENTER | Facility: CLINIC | Age: 41
End: 2021-06-21

## 2021-06-21 DIAGNOSIS — N63.10 UNSPECIFIED LUMP IN THE RIGHT BREAST, UNSPECIFIED QUADRANT: ICD-10-CM

## 2021-06-21 DIAGNOSIS — N63.20 UNSPECIFIED LUMP IN THE RIGHT BREAST, UNSPECIFIED QUADRANT: ICD-10-CM

## 2021-06-21 PROCEDURE — 99213 OFFICE O/P EST LOW 20 MIN: CPT

## 2021-06-21 RX ORDER — SPIRONOLACTONE 50 MG/1
50 TABLET ORAL
Qty: 30 | Refills: 0 | Status: ACTIVE | COMMUNITY
Start: 2021-06-05

## 2021-06-21 RX ORDER — BENZOYL PEROXIDE 5 G/100G
5 LIQUID TOPICAL
Qty: 142 | Refills: 0 | Status: ACTIVE | COMMUNITY
Start: 2021-06-05

## 2021-06-21 RX ORDER — CLINDAMYCIN PHOSPHATE 10 MG/ML
1 LOTION TOPICAL
Qty: 60 | Refills: 0 | Status: ACTIVE | COMMUNITY
Start: 2021-06-05

## 2021-06-21 RX ORDER — TRETINOIN 0.25 MG/G
0.03 CREAM TOPICAL
Qty: 45 | Refills: 0 | Status: ACTIVE | COMMUNITY
Start: 2021-06-05

## 2021-06-22 NOTE — HISTORY OF PRESENT ILLNESS
[FreeTextEntry1] : EBONY is a 40 year  female who presents for follow up of left breast mass 5:00 2cmFN fibroadenoma s/p excisional biopsy for interval growth on 3/19/2021 final surgical pathology was benign. She reports intermittent pain at surgical site "4/10", states her breast is  at times. Denies fever or chills. \par \par BIB lifetime risk is 16.8%, discussed breast cancer risk.\par \par She underwent a bilateral breast ultrasound June 2021 showing benign appearing bilateral breast nodules and new right 2:00 1cmFN 0.7cm well defined nodule, recommended for bilateral breast ultrasound in 6 months.  \par \par Discussed that the breast pain is likely due to normal healing process. Recommended warm compress with massage 1-2 x a day.

## 2021-06-22 NOTE — DATA REVIEWED
[FreeTextEntry1] : 11/19/2020 ([facility]) bilateral breast US showing a mass in left breast 5:00 2cmFN, which underwent a benign biopsy at outside facility 2/5/2014 demonstrating interval enlargement. Further evaluation with US guided biopsy is recommended. 6 month follow up left breast US recommended for 2 probably benign masses in left breast at 1:00 1cmFN and 10:00 2cmFN. BIRADS 4 \par \par 11/30/2020 ([facility]) left breast 5:00 2cmFN US guided biopsy pathology fibroadenoma. Benign, concordant recommended 6 month follow up left breast US. \par \par 12/25/2020 ([facility]) HBOC panel negative results \par \par 3/19/2021 ([facility]) let breast lesion: fibroadenoma, prior biopsy site changes \par \par 6/11/21 ([facility]) B/l breast US: benign appearing nodules bilaterally. The nodule at 2:00 measuring 0.7cm, is a new finding, benign and similar in appearance to the other nodules that are present. 6-month f/u US is recommended. BI-RADS 3.

## 2021-06-22 NOTE — REASON FOR VISIT
[Follow-Up: _____] : a [unfilled] follow-up visit [FreeTextEntry1] : left breast mass 5:00 2cmFN fibroadenoma s/p excisional biopsy for interval growth on 3/19/2021 final surgical pathology was benign.

## 2021-06-29 ENCOUNTER — TRANSCRIPTION ENCOUNTER (OUTPATIENT)
Age: 41
End: 2021-06-29

## 2021-07-06 NOTE — DISCUSSION/SUMMARY
[FreeTextEntry1] : Annual exam\par \par 1. VIN3 2010, no vulvar issues on exam today\par 2. Chronic yeast infections - will Rx a prolonged diflucan course, reviewed lifestyle modifications for vaginitis in detail\par 3. Intermenstrual bleeding - TVUS to assess for polyp, thickened endometrium etc\par \par Will call after ultrasound to determine plan of care\par Follow up in 1 year or prn

## 2021-07-06 NOTE — PHYSICAL EXAM
[Normal] : Anus and perineum: Normal sphincter tone, no masses, no prolapse. [Fully active, able to carry on all pre-disease performance without restriction] : Status 0 - Fully active, able to carry on all pre-disease performance without restriction

## 2021-07-06 NOTE — HISTORY OF PRESENT ILLNESS
[FreeTextEntry1] : 41 yo with history of VIN3 treated in  here to establish annual gyn care.\par Current issues: intermenstrual bleeding, mostly spotting. Also reports yeast infections about every 3 weeks for more than 1 year\par \par GYNHx: VIN3 \par OBHx: x3\par PMHx: denies\par PSHx: breast surgery \par Med: none\par All: none\par no toxic habits

## 2021-09-21 ENCOUNTER — NON-APPOINTMENT (OUTPATIENT)
Age: 41
End: 2021-09-21

## 2021-09-24 ENCOUNTER — APPOINTMENT (OUTPATIENT)
Dept: GYNECOLOGIC ONCOLOGY | Facility: CLINIC | Age: 41
End: 2021-09-24
Payer: MEDICAID

## 2021-09-24 VITALS
OXYGEN SATURATION: 97 % | BODY MASS INDEX: 37.56 KG/M2 | HEIGHT: 64 IN | WEIGHT: 220 LBS | TEMPERATURE: 97.2 F | HEART RATE: 83 BPM | RESPIRATION RATE: 18 BRPM | DIASTOLIC BLOOD PRESSURE: 69 MMHG | SYSTOLIC BLOOD PRESSURE: 100 MMHG

## 2021-09-24 PROCEDURE — 99213 OFFICE O/P EST LOW 20 MIN: CPT

## 2021-09-27 LAB
DHEA-S SERPL-MCNC: 69.2 UG/DL
ESTIMATED AVERAGE GLUCOSE: 117 MG/DL
FSH SERPL-MCNC: 6.2 IU/L
HBA1C MFR BLD HPLC: 5.7 %
LH SERPL-ACNC: 10.3 IU/L
TSH SERPL-ACNC: 1.26 UIU/ML

## 2021-09-27 NOTE — HISTORY OF PRESENT ILLNESS
[FreeTextEntry1] : Here today for follow up\par \par Intermenstrual bleeding/irregular menses\par Pelvic US 21\par    wnl, endometrial lining 3mm\par    multiple follicles in ovaries c/f PCOS\par \par Chronic Yeast infections\par treated with prolonged course started 2021 for 6 months\par \par \par \par GYNHx: 2010\par OBHx: x3\par PMHx: denies\par PSHx: breast surgery \par Med: none\par All: none\par no toxic habits

## 2021-09-27 NOTE — DISCUSSION/SUMMARY
[FreeTextEntry1] : Annual exam\par \par 1. VIN3 2010, no vulvar issues on exam today\par 2. Chronic yeast infections - will Rx a prolonged diflucan course, reviewed lifestyle modifications for vaginitis in detail\par 3. irregular bleeding - PCOS labs today given US results\par \par Will call with results to determine plan\par Follow up in 1 year or prn

## 2021-09-28 LAB
A VAGINAE DNA VAG QL NAA+PROBE: NORMAL
BVAB2 DNA VAG QL NAA+PROBE: NORMAL
C KRUSEI DNA VAG QL NAA+PROBE: NEGATIVE
C TRACH RRNA SPEC QL NAA+PROBE: NEGATIVE
MEGA1 DNA VAG QL NAA+PROBE: NORMAL
N GONORRHOEA RRNA SPEC QL NAA+PROBE: NEGATIVE
T VAGINALIS RRNA SPEC QL NAA+PROBE: NEGATIVE
TESTOST BND SERPL-MCNC: 0.6 PG/ML
TESTOSTERONE TOTAL S: 11 NG/DL

## 2021-10-01 ENCOUNTER — APPOINTMENT (OUTPATIENT)
Dept: BREAST CENTER | Facility: CLINIC | Age: 41
End: 2021-10-01
Payer: MEDICAID

## 2021-10-01 ENCOUNTER — NON-APPOINTMENT (OUTPATIENT)
Age: 41
End: 2021-10-01

## 2021-10-01 VITALS — OXYGEN SATURATION: 98 % | HEART RATE: 91 BPM | WEIGHT: 220 LBS | HEIGHT: 64 IN | BODY MASS INDEX: 37.56 KG/M2

## 2021-10-01 PROCEDURE — 99213 OFFICE O/P EST LOW 20 MIN: CPT

## 2021-10-04 NOTE — PHYSICAL EXAM
[Symmetrical] : symmetrical [Breast Nipple Inversion] : nipples not inverted [Breast Nipple Retraction] : nipples not retracted [Breast Nipple Flattening] : nipples not flattened [Breast Nipple Fissures] : nipples not fissured [de-identified] : tenderness to palpation  [de-identified] : incisions well healed

## 2021-10-04 NOTE — HISTORY OF PRESENT ILLNESS
[FreeTextEntry1] : Kimberly is a 40 year  female who presents for follow up of left breast pain, ongoing since excisional biopsy for a mass 5:00 2cmFN fibroadenoma on 3/19/2021 final surgical pathology was benign. Today she rates the pain at 6/10, states it fluctuates in intensity, and describes it as a "steady pain" over the entire breast, which can make it difficult to sleep on her stomach. She has tried massaging the area and OTC Tylenol with minimal relief. \par \par Of note, she states she recently found out she has PCOS. \par \par BIB lifetime risk is 16.8%\par

## 2021-10-04 NOTE — ASSESSMENT
[FreeTextEntry1] : 39 yo female presents for ongoing mastalgia; Discussed with the patient that mastalgia is not a common sign of breast cancer. I reviewed keeping a symptom calendar, the use of OTC Ibuprofen, decreasing caffeine intake (includes coffee, tea, chocolate, energy drinks, soda), wearing a sports bra during the day and in the evenings, use of SalonPas patches, and evening primrose oil (high dose EPO handout was provided). \par \par I also encouraged her to f/u with her PCP to r/o cardiac causes of the pain; the patient states that cardiac w/u was negative. I also offered to provide a PT referred, which she declined.

## 2021-10-04 NOTE — DATA REVIEWED
[FreeTextEntry1] : 11/19/2020 ([facility]) bilateral breast US showing a mass in left breast 5:00 2cmFN, which underwent a benign biopsy at outside facility 2/5/2014 demonstrating interval enlargement. Further evaluation with US guided biopsy is recommended. 6 month follow up left breast US recommended for 2 probably benign masses in left breast at 1:00 1cmFN and 10:00 2cmFN. BIRADS 4 \par \par 11/30/2020 ([facility]) left breast 5:00 2cmFN US guided biopsy pathology fibroadenoma. Benign, concordant recommended 6 month follow up left breast US. \par \par 12/25/2020 ([facility]) HBOC panel negative results \par \par 3/19/2021 ([facility]) let breast lesion: fibroadenoma, prior biopsy site changes \par \par 6/11/21 ([facility]) B/l breast US: benign appearing nodules bilaterally. The nodule at 2:00 measuring 0.7cm, is a new finding, benign and similar in appearance to the other nodules that are present. 6-month f/u US is recommended. BI-RADS 3.

## 2021-10-04 NOTE — REASON FOR VISIT
[FreeTextEntry1] : left breast mass 5:00 2cmFN fibroadenoma s/p excisional biopsy for interval growth on 3/19/2021 final surgical pathology was benign.

## 2021-10-04 NOTE — REVIEW OF SYSTEMS
[As Noted in HPI] : as noted in HPI [Breast Pain] : breast pain [FreeTextEntry2] : headaches from budd chiari malformation

## 2021-10-04 NOTE — PAST MEDICAL HISTORY
[Perimenopausal] : The patient is perimenopausal [Approximately ___] : the LMP was approximately [unfilled] [Irregular Cycle Intervals] : are  irregular [History of Hormone Replacement Treatment] : has no history of hormone replacement treatment [FreeTextEntry4] : recently started to be irregular  [FreeTextEntry5] : Tubal ligation [FreeTextEntry6] : None [FreeTextEntry7] : She used for a few years in the past [FreeTextEntry8] : She breast fed all 3 children

## 2021-10-08 LAB
17OHP SERPL-MCNC: 51 NG/DL
ESTROGEN SERPL-MCNC: 136 PG/ML

## 2021-10-11 ENCOUNTER — APPOINTMENT (OUTPATIENT)
Dept: BREAST CENTER | Facility: CLINIC | Age: 41
End: 2021-10-11

## 2021-12-03 ENCOUNTER — APPOINTMENT (OUTPATIENT)
Dept: BREAST CENTER | Facility: CLINIC | Age: 41
End: 2021-12-03
Payer: MEDICAID

## 2021-12-03 ENCOUNTER — NON-APPOINTMENT (OUTPATIENT)
Age: 41
End: 2021-12-03

## 2021-12-03 DIAGNOSIS — N64.4 MASTODYNIA: ICD-10-CM

## 2021-12-03 PROCEDURE — 99441: CPT

## 2021-12-17 ENCOUNTER — NON-APPOINTMENT (OUTPATIENT)
Age: 41
End: 2021-12-17

## 2022-01-13 ENCOUNTER — NON-APPOINTMENT (OUTPATIENT)
Age: 42
End: 2022-01-13

## 2022-02-11 ENCOUNTER — APPOINTMENT (OUTPATIENT)
Dept: BREAST CENTER | Facility: CLINIC | Age: 42
End: 2022-02-11

## 2022-04-21 ENCOUNTER — APPOINTMENT (OUTPATIENT)
Dept: GYNECOLOGIC ONCOLOGY | Facility: CLINIC | Age: 42
End: 2022-04-21
Payer: MEDICAID

## 2022-04-21 VITALS
WEIGHT: 213 LBS | HEART RATE: 71 BPM | DIASTOLIC BLOOD PRESSURE: 72 MMHG | HEIGHT: 64 IN | OXYGEN SATURATION: 97 % | SYSTOLIC BLOOD PRESSURE: 106 MMHG | TEMPERATURE: 95.8 F | BODY MASS INDEX: 36.37 KG/M2

## 2022-04-21 PROCEDURE — 99214 OFFICE O/P EST MOD 30 MIN: CPT | Mod: 25

## 2022-04-21 PROCEDURE — 56820 COLPOSCOPY VULVA: CPT

## 2022-04-21 RX ORDER — CLOTRIMAZOLE 10 MG/G
1 CREAM TOPICAL 3 TIMES DAILY
Qty: 1 | Refills: 1 | Status: ACTIVE | COMMUNITY
Start: 2022-04-21 | End: 1900-01-01

## 2022-04-21 RX ORDER — FLUCONAZOLE 150 MG/1
150 TABLET ORAL
Qty: 10 | Refills: 6 | Status: COMPLETED | COMMUNITY
Start: 2021-09-24 | End: 2022-04-21

## 2022-04-21 RX ORDER — FLUCONAZOLE 150 MG/1
150 TABLET ORAL
Qty: 30 | Refills: 2 | Status: COMPLETED | COMMUNITY
Start: 2021-06-04 | End: 2022-04-21

## 2022-04-21 NOTE — DISCUSSION/SUMMARY
[FreeTextEntry1] : 6 month follow up. Right Vulva colposcopy performed with normal findings. \par \par 1. VIN3 2010, no vulvar issues on exam today\par 2. Chronic yeast infections - will Rx a prolonged diflucan course, reviewed lifestyle modifications for vaginitis in detail\par 3. Cotesting performed today\par 4. Vaginitis panel/GC\par Follow up Sept 2022 with ORLANDO Rubio for annual exam

## 2022-04-21 NOTE — HISTORY OF PRESENT ILLNESS
[FreeTextEntry1] : Here today for follow up\par \par Intermenstrual bleeding/irregular menses\par Pelvic US 21\par    wnl, endometrial lining 3mm\par    multiple follicles in ovaries c/f PCOS\par \par Pt has h/o chronic yeast infections. Presents today c/o vaginal discharge, vaginal itch and possibly "new spots" on vagina. LMP: 24 , pt was prescribed OCP but she stopped them due to stomach issues. She reports normal menses now, occuring once per month. Pt reports her last pap was over 2 years ago and was normal. \par \par Last pap 2018 - neg , hpv neg \par \par GYNHx: VIN3 \par OBHx: x3\par PMHx: denies\par PSHx: breast surgery \par Med: none\par All: none\par no toxic habits

## 2022-04-21 NOTE — REVIEW OF SYSTEMS
[Negative] : Musculoskeletal [Vaginal Discharge] : vaginal discharge [FreeTextEntry4] : vaginal itching

## 2022-04-22 LAB
C TRACH RRNA SPEC QL NAA+PROBE: NOT DETECTED
N GONORRHOEA RRNA SPEC QL NAA+PROBE: NOT DETECTED
SOURCE AMPLIFICATION: NORMAL

## 2022-04-29 ENCOUNTER — NON-APPOINTMENT (OUTPATIENT)
Age: 42
End: 2022-04-29

## 2022-09-30 ENCOUNTER — APPOINTMENT (OUTPATIENT)
Dept: BREAST CENTER | Facility: CLINIC | Age: 42
End: 2022-09-30

## 2022-09-30 VITALS — OXYGEN SATURATION: 98 % | HEART RATE: 72 BPM

## 2022-09-30 DIAGNOSIS — N64.4 MASTODYNIA: ICD-10-CM

## 2022-09-30 DIAGNOSIS — N63.0 UNSPECIFIED LUMP IN UNSPECIFIED BREAST: ICD-10-CM

## 2022-09-30 PROCEDURE — 99213 OFFICE O/P EST LOW 20 MIN: CPT

## 2022-09-30 NOTE — CONSULT LETTER
[Dear  ___] : Dear  [unfilled], [Consult Letter:] : I had the pleasure of evaluating your patient, [unfilled]. [( Thank you for referring [unfilled] for consultation for _____ )] : Thank you for referring [unfilled] for consultation for [unfilled] [Please see my note below.] : Please see my note below. [Consult Closing:] : Thank you very much for allowing me to participate in the care of this patient.  If you have any questions, please do not hesitate to contact me. [Sincerely,] : Sincerely, [FreeTextEntry3] : Best regards,\par  \par Ronda Jaime, \par Breast Surgeon\par [facility]\par [address]\[place] [address]\par Tel: [phone]\par Fax: [phone]\par Email: [email or web address]\par \par

## 2022-09-30 NOTE — PAST MEDICAL HISTORY
[Menarche Age ____] : age at menarche was [unfilled] [Definite ___ (Date)] : the last menstrual period was [unfilled] [Regular Cycle Intervals] : have been regular [Total Preg ___] : G[unfilled] [Live Births ___] : P[unfilled]  [Living ___] : Living: [unfilled] [History of Hormone Replacement Treatment] : has no history of hormone replacement treatment [Age At Live Birth ___] : Age at live birth: [unfilled] [FreeTextEntry5] : Tubal ligation [FreeTextEntry6] : None [FreeTextEntry7] : She used for a few years in the past [FreeTextEntry8] : She breast fed all 3 children

## 2022-09-30 NOTE — DATA REVIEWED
[FreeTextEntry1] : 11/19/2020 ([facility]) bilateral breast US showing a mass in left breast 5:00 2cmFN, which underwent a benign biopsy at outside facility 2/5/2014 demonstrating interval enlargement. Further evaluation with US guided biopsy is recommended. 6 month follow up left breast US recommended for 2 probably benign masses in left breast at 1:00 1cmFN and 10:00 2cmFN. BIRADS 4 \par \par 11/30/2020 ([facility]) left breast 5:00 2cmFN US guided biopsy pathology fibroadenoma. Benign, concordant recommended 6 month follow up left breast US. \par \par

## 2022-09-30 NOTE — HISTORY OF PRESENT ILLNESS
[FreeTextEntry1] : EBONY is a 39 year female referred by Dr. Andrew Ott who presents for initial evaluation regarding left breast mass 5:00 2cmFN biopsy proven fibroadenoma with interval growth currently measuring 1.8cm (from 1.0cm). The mass has been noted since approximately 2014 and is associated with pain. Patient otherwise has no breast complaints today. Denies nipple discharge, nipple/breast skin changes or dimpling. Denies fever and chills. \par \par BIB lifetime risk is 16.8%, discussed breast cancer risk.\par \par She reports history of right breast excisional biopsy at age 18; and another excisional biopsy, bilateral at age 20. The patient reports that the pathology was benign. Surgery was performed at [facility]. \par \par Discussed fibroadenomas and their relation to phyllodes tumors. Discussed the rate of growth and options to follow up with imaging versus excision. Discussed risks, benefits and alternatives of surgical excision. Risks including infection, hematoma, seroma formation, as well as infection, and wound complications, risk of need for further procedure. 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 She also is undergoing an endoscopy for heartburn. Discussed with patient that we can continue observation of the fibroadenoma if scheduling issues occur.\par \par Discussed importance and implication of genetic testing in regards to her family history and offspring. Patient would like to go forward with genetic testing.

## 2022-10-03 PROBLEM — N63.0 BREAST NODULE: Status: ACTIVE | Noted: 2021-06-21

## 2022-10-03 PROBLEM — N64.4 MASTALGIA: Status: ACTIVE | Noted: 2021-10-01

## 2022-10-03 NOTE — DATA REVIEWED
[FreeTextEntry1] : 11/19/2020 ([facility]) bilateral breast US showing a mass in left breast 5:00 2cmFN, which underwent a benign biopsy at outside facility 2/5/2014 demonstrating interval enlargement. Further evaluation with US guided biopsy is recommended. 6 month follow up left breast US recommended for 2 probably benign masses in left breast at 1:00 1cmFN and 10:00 2cmFN. BIRADS 4 \par \par 11/30/2020 ([facility]) left breast 5:00 2cmFN US guided biopsy pathology fibroadenoma. Benign, concordant recommended 6 month follow up left breast US. \par \par 12/25/2020 ([facility]) HBOC panel negative results \par \par 3/19/2021 ([facility]) let breast lesion: fibroadenoma, prior biopsy site changes \par \par 6/11/21 ([facility]) B/l breast US: benign appearing nodules bilaterally. The nodule at 2:00 measuring 0.7cm, is a new finding, benign and similar in appearance to the other nodules that are present. 6-month f/u US is recommended. BI-RADS 3. \par \par 12/8/21 ([facility]) b/l US: no e/o malignancy. Stable morphologically benign appearing circumscribed nodules. BI-RADS 2.

## 2022-10-03 NOTE — REVIEW OF SYSTEMS
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

## 2022-10-03 NOTE — PHYSICAL EXAM
[Normocephalic] : normocephalic [Atraumatic] : atraumatic [Supple] : supple [No Supraclavicular Adenopathy] : no supraclavicular adenopathy [Examined in the supine and seated position] : examined in the supine and seated position [Symmetrical] : symmetrical [No Nipple Retraction] : no left nipple retraction [No Nipple Discharge] : no left nipple discharge [No Axillary Lymphadenopathy] : no left axillary lymphadenopathy [No Edema] : no edema [No Rashes] : no rashes [No Ulceration] : no ulceration [Breast Nipple Inversion] : nipples not inverted [Breast Nipple Retraction] : nipples not retracted [Breast Nipple Flattening] : nipples not flattened [Breast Nipple Fissures] : nipples not fissured [de-identified] : subcentimeter nodule retroareolar  [de-identified] : tenderness to palpation; 1cm palpable nodule at 10n2, subcentimeter nodules at 1:00n2 [de-identified] : incisions well healed

## 2022-10-03 NOTE — HISTORY OF PRESENT ILLNESS
[FreeTextEntry1] : Kimberly is a 41 year  female who presents for follow up of left breast pain, ongoing since excisional biopsy for a mass 5:00 2cmFN fibroadenoma on 3/19/2021 final surgical pathology was benign. Reports the pain is still there, but is slightly improved from her prior visit. Tylenol & wearing of a sports bra have helped. She has tried massaging the area and OTC Tylenol with minimal relief.\par \par BIB lifetime risk is 16.8% maternal aunt has a history of breast cancer\par

## 2022-10-03 NOTE — ASSESSMENT
[FreeTextEntry1] : 40 yo female presents for ongoing mastalgia with improvement after implementation of previously recommended interventions. Reviewed that she should proceed with screening mammo and US in December, and if benign, mammo/US in December 2023, and follow-up after for examination. The patient verbalized understanding and is in agreement with the plan.

## 2022-10-03 NOTE — PAST MEDICAL HISTORY
[Perimenopausal] : The patient is perimenopausal [Menarche Age ____] : age at menarche was [unfilled] [Approximately ___] : the LMP was approximately [unfilled] [Irregular Cycle Intervals] : are  irregular [Total Preg ___] : G[unfilled] [Live Births ___] : P[unfilled]  [Living ___] : Living: [unfilled] [History of Hormone Replacement Treatment] : has no history of hormone replacement treatment [FreeTextEntry4] : recently started to be irregular  [FreeTextEntry5] : Tubal ligation [FreeTextEntry6] : None [FreeTextEntry7] : She used for a few years in the past [FreeTextEntry8] : She breast fed all 3 children

## 2022-11-03 ENCOUNTER — APPOINTMENT (OUTPATIENT)
Dept: GYNECOLOGIC ONCOLOGY | Facility: CLINIC | Age: 42
End: 2022-11-03

## 2022-11-03 VITALS
BODY MASS INDEX: 36.7 KG/M2 | SYSTOLIC BLOOD PRESSURE: 97 MMHG | TEMPERATURE: 97.3 F | WEIGHT: 215 LBS | HEIGHT: 64 IN | DIASTOLIC BLOOD PRESSURE: 65 MMHG | OXYGEN SATURATION: 96 % | HEART RATE: 74 BPM

## 2022-11-03 DIAGNOSIS — E28.2 POLYCYSTIC OVARIAN SYNDROME: ICD-10-CM

## 2022-11-03 DIAGNOSIS — D07.1 CARCINOMA IN SITU OF VULVA: ICD-10-CM

## 2022-11-03 PROCEDURE — 99395 PREV VISIT EST AGE 18-39: CPT

## 2022-11-03 RX ORDER — NORETHINDRONE ACETATE AND ETHINYL ESTRADIOL 1.5; 3 MG/1; UG/1
1.5-3 TABLET ORAL
Qty: 1 | Refills: 3 | Status: COMPLETED | COMMUNITY
Start: 2021-10-22 | End: 2022-11-03

## 2022-11-03 RX ORDER — FLUCONAZOLE 150 MG/1
150 TABLET ORAL
Qty: 2 | Refills: 2 | Status: COMPLETED | COMMUNITY
Start: 2022-04-21 | End: 2022-11-03

## 2022-11-03 NOTE — PHYSICAL EXAM
[Normal] : Anus and perineum: Normal sphincter tone, no masses, no prolapse. [Fully active, able to carry on all pre-disease performance without restriction] : Status 0 - Fully active, able to carry on all pre-disease performance without restriction [Chaperone Present] : A chaperone was present in the examining room during all aspects of the physical examination

## 2022-11-03 NOTE — HISTORY OF PRESENT ILLNESS
[FreeTextEntry1] : Here today for annual exam\par \par Problem\par 1) Intermenstrual bleeding/irregular menses c/f PCOS\par     a) stopped OCP due to stomach issues\par 2) Chronic yeast infections\par 3) remote history of 2010\par \par previous work up: \par 1) Pelvic US 21\par    wnl, endometrial lining 3mm\par    multiple follicles in ovaries c/f PCOS \par \par Last pap 2021 neg no HPV sent. Still having irregular menses - last period August. Stopped Junel after 1 month bc of stomach upset. Took prolonged diflucan treatment earlier this year but still having some yeast infections. Infections are more than once a month. Treats herself with Monistat 3 day and antifungal cream to vulva. With treatment discharge will get a little better but not go away fully. Also reports pelvic pain.\par \par GYNHx: 2010\par OBHx: x3\par PMHx: denies\par PSHx: breast surgery \par Med: none\par All: none\par no toxic habits

## 2022-11-03 NOTE — DISCUSSION/SUMMARY
[FreeTextEntry1] : Annual exam  with pelvic pain, continued yeast infections and irregular bleeding\par \par Based on irregular menses and US from 2021, likely PCOS\par  - unable to tolerate OCPs and IUD\par  - trial of Metformin 500mg\par  - Pelvic US now\par  - repeat hormonal labs\par \par Recurrent yeast infections\par  - never confirmed on vaginitis panel but symptoms c/w yeast. 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

## 2022-11-07 LAB
DHEA-S SERPL-MCNC: 82.8 UG/DL
ESTIMATED AVERAGE GLUCOSE: 123 MG/DL
FSH SERPL-MCNC: 5.5 IU/L
HBA1C MFR BLD HPLC: 5.9 %
LH SERPL-ACNC: 11.8 IU/L
SOURCE AMPLIFICATION: NORMAL
T VAGINALIS RRNA SPEC QL NAA+PROBE: NOT DETECTED
TESTOST SERPL-MCNC: 15.5 NG/DL

## 2022-11-10 LAB — ESTROGEN SERPL-MCNC: 119 PG/ML

## 2022-11-11 LAB
A VAGINAE DNA VAG QL NAA+PROBE: NORMAL
BVAB2 DNA VAG QL NAA+PROBE: NORMAL
C KRUSEI DNA VAG QL NAA+PROBE: NEGATIVE
C KRUSEI DNA VAG QL NAA+PROBE: POSITIVE
C TRACH RRNA SPEC QL NAA+PROBE: NEGATIVE
MEGA1 DNA VAG QL NAA+PROBE: NORMAL
N GONORRHOEA RRNA SPEC QL NAA+PROBE: NEGATIVE
T VAGINALIS RRNA SPEC QL NAA+PROBE: NEGATIVE

## 2022-12-13 ENCOUNTER — TRANSCRIPTION ENCOUNTER (OUTPATIENT)
Age: 42
End: 2022-12-13

## 2022-12-19 ENCOUNTER — TRANSCRIPTION ENCOUNTER (OUTPATIENT)
Age: 42
End: 2022-12-19

## 2023-03-09 ENCOUNTER — TRANSCRIPTION ENCOUNTER (OUTPATIENT)
Age: 43
End: 2023-03-09

## 2023-05-25 ENCOUNTER — APPOINTMENT (OUTPATIENT)
Dept: GYNECOLOGIC ONCOLOGY | Facility: CLINIC | Age: 43
End: 2023-05-25
Payer: MEDICAID

## 2023-05-25 ENCOUNTER — NON-APPOINTMENT (OUTPATIENT)
Age: 43
End: 2023-05-25

## 2023-05-25 VITALS
TEMPERATURE: 96.7 F | HEIGHT: 64 IN | WEIGHT: 215 LBS | OXYGEN SATURATION: 99 % | SYSTOLIC BLOOD PRESSURE: 132 MMHG | HEART RATE: 69 BPM | DIASTOLIC BLOOD PRESSURE: 77 MMHG | BODY MASS INDEX: 36.7 KG/M2

## 2023-05-25 DIAGNOSIS — B37.31 ACUTE CANDIDIASIS OF VULVA AND VAGINA: ICD-10-CM

## 2023-05-25 PROCEDURE — 99212 OFFICE O/P EST SF 10 MIN: CPT

## 2023-06-02 RX ORDER — PUMPKIN SEED EXTRACT/SOY GERM 300 MG
600 CAPSULE ORAL
Qty: 14 | Refills: 0 | Status: ACTIVE | COMMUNITY
Start: 2023-06-02 | End: 1900-01-01

## 2023-06-06 NOTE — HISTORY OF PRESENT ILLNESS
[FreeTextEntry1] : Here today for annual exam\par \par Problem\par 1) Intermenstrual bleeding/irregular menses c/f PCOS\par     a) stopped OCP due to stomach issues\par 2) Chronic yeast infections\par 3) remote history of 2010\par \par previous work up: \par 1) Pelvic US 21\par    wnl, endometrial lining 3mm\par    multiple follicles in ovaries c/f PCOS \par \par Last month has been very uncomfortable - abnormal discharge vaginal itching and irritation. Has both irritation and itching, worst with menses. Took a diflucan without any improvement. \par \par GYNHx: 2010\par OBHx: x3\par PMHx: denies\par PSHx: breast surgery \par Med: none\par All: none\par no toxic habits
Statement Selected

## 2023-06-06 NOTE — DISCUSSION/SUMMARY
[FreeTextEntry1] : \par Recurrent yeast infections\par  - never confirmed on vaginitis panel but symptoms c/w yeast. 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

## 2023-06-08 ENCOUNTER — EMERGENCY (EMERGENCY)
Facility: HOSPITAL | Age: 43
LOS: 1 days | Discharge: HOME | End: 2023-06-08
Attending: STUDENT IN AN ORGANIZED HEALTH CARE EDUCATION/TRAINING PROGRAM | Admitting: STUDENT IN AN ORGANIZED HEALTH CARE EDUCATION/TRAINING PROGRAM
Payer: COMMERCIAL

## 2023-06-08 VITALS
SYSTOLIC BLOOD PRESSURE: 117 MMHG | RESPIRATION RATE: 18 BRPM | OXYGEN SATURATION: 98 % | HEIGHT: 64 IN | HEART RATE: 61 BPM | TEMPERATURE: 98 F | WEIGHT: 214.95 LBS | DIASTOLIC BLOOD PRESSURE: 76 MMHG

## 2023-06-08 VITALS
RESPIRATION RATE: 18 BRPM | DIASTOLIC BLOOD PRESSURE: 69 MMHG | TEMPERATURE: 98 F | SYSTOLIC BLOOD PRESSURE: 103 MMHG | OXYGEN SATURATION: 100 % | HEART RATE: 59 BPM

## 2023-06-08 DIAGNOSIS — Z98.89 OTHER SPECIFIED POSTPROCEDURAL STATES: Chronic | ICD-10-CM

## 2023-06-08 DIAGNOSIS — R53.83 OTHER FATIGUE: ICD-10-CM

## 2023-06-08 DIAGNOSIS — Z87.19 PERSONAL HISTORY OF OTHER DISEASES OF THE DIGESTIVE SYSTEM: ICD-10-CM

## 2023-06-08 DIAGNOSIS — R53.1 WEAKNESS: ICD-10-CM

## 2023-06-08 DIAGNOSIS — R53.81 OTHER MALAISE: ICD-10-CM

## 2023-06-08 DIAGNOSIS — K29.70 GASTRITIS, UNSPECIFIED, WITHOUT BLEEDING: ICD-10-CM

## 2023-06-08 DIAGNOSIS — R10.10 UPPER ABDOMINAL PAIN, UNSPECIFIED: ICD-10-CM

## 2023-06-08 DIAGNOSIS — E66.3 OVERWEIGHT: ICD-10-CM

## 2023-06-08 LAB
ALBUMIN SERPL ELPH-MCNC: 4 G/DL — SIGNIFICANT CHANGE UP (ref 3.3–5)
ALP SERPL-CCNC: 61 U/L — SIGNIFICANT CHANGE UP (ref 40–120)
ALT FLD-CCNC: 17 U/L — SIGNIFICANT CHANGE UP (ref 10–45)
ANION GAP SERPL CALC-SCNC: 8 MMOL/L — SIGNIFICANT CHANGE UP (ref 5–17)
APPEARANCE UR: CLEAR — SIGNIFICANT CHANGE UP
AST SERPL-CCNC: 15 U/L — SIGNIFICANT CHANGE UP (ref 10–40)
BACTERIA # UR AUTO: SIGNIFICANT CHANGE UP /HPF
BASOPHILS # BLD AUTO: 0.04 K/UL — SIGNIFICANT CHANGE UP (ref 0–0.2)
BASOPHILS NFR BLD AUTO: 0.9 % — SIGNIFICANT CHANGE UP (ref 0–2)
BILIRUB SERPL-MCNC: 0.2 MG/DL — SIGNIFICANT CHANGE UP (ref 0.2–1.2)
BILIRUB UR-MCNC: NEGATIVE — SIGNIFICANT CHANGE UP
BUN SERPL-MCNC: 16 MG/DL — SIGNIFICANT CHANGE UP (ref 7–23)
CALCIUM SERPL-MCNC: 9.2 MG/DL — SIGNIFICANT CHANGE UP (ref 8.4–10.5)
CHLORIDE SERPL-SCNC: 105 MMOL/L — SIGNIFICANT CHANGE UP (ref 96–108)
CO2 SERPL-SCNC: 26 MMOL/L — SIGNIFICANT CHANGE UP (ref 22–31)
COLOR SPEC: YELLOW — SIGNIFICANT CHANGE UP
CREAT SERPL-MCNC: 0.59 MG/DL — SIGNIFICANT CHANGE UP (ref 0.5–1.3)
DIFF PNL FLD: ABNORMAL
EGFR: 117 ML/MIN/1.73M2 — SIGNIFICANT CHANGE UP
EOSINOPHIL # BLD AUTO: 0.3 K/UL — SIGNIFICANT CHANGE UP (ref 0–0.5)
EOSINOPHIL NFR BLD AUTO: 7.1 % — HIGH (ref 0–6)
EPI CELLS # UR: SIGNIFICANT CHANGE UP /HPF (ref 0–5)
GLUCOSE SERPL-MCNC: 95 MG/DL — SIGNIFICANT CHANGE UP (ref 70–99)
GLUCOSE UR QL: NEGATIVE — SIGNIFICANT CHANGE UP
HCG SERPL-ACNC: <0 MIU/ML — SIGNIFICANT CHANGE UP
HCT VFR BLD CALC: 36.3 % — SIGNIFICANT CHANGE UP (ref 34.5–45)
HGB BLD-MCNC: 12 G/DL — SIGNIFICANT CHANGE UP (ref 11.5–15.5)
IMM GRANULOCYTES NFR BLD AUTO: 0.2 % — SIGNIFICANT CHANGE UP (ref 0–0.9)
KETONES UR-MCNC: NEGATIVE — SIGNIFICANT CHANGE UP
LEUKOCYTE ESTERASE UR-ACNC: NEGATIVE — SIGNIFICANT CHANGE UP
LIDOCAIN IGE QN: 43 U/L — SIGNIFICANT CHANGE UP (ref 7–60)
LYMPHOCYTES # BLD AUTO: 1.13 K/UL — SIGNIFICANT CHANGE UP (ref 1–3.3)
LYMPHOCYTES # BLD AUTO: 26.7 % — SIGNIFICANT CHANGE UP (ref 13–44)
MCHC RBC-ENTMCNC: 29.8 PG — SIGNIFICANT CHANGE UP (ref 27–34)
MCHC RBC-ENTMCNC: 33.1 GM/DL — SIGNIFICANT CHANGE UP (ref 32–36)
MCV RBC AUTO: 90.1 FL — SIGNIFICANT CHANGE UP (ref 80–100)
MONOCYTES # BLD AUTO: 0.24 K/UL — SIGNIFICANT CHANGE UP (ref 0–0.9)
MONOCYTES NFR BLD AUTO: 5.7 % — SIGNIFICANT CHANGE UP (ref 2–14)
NEUTROPHILS # BLD AUTO: 2.51 K/UL — SIGNIFICANT CHANGE UP (ref 1.8–7.4)
NEUTROPHILS NFR BLD AUTO: 59.4 % — SIGNIFICANT CHANGE UP (ref 43–77)
NITRITE UR-MCNC: NEGATIVE — SIGNIFICANT CHANGE UP
NRBC # BLD: 0 /100 WBCS — SIGNIFICANT CHANGE UP (ref 0–0)
OB PNL STL: NEGATIVE — SIGNIFICANT CHANGE UP
PH UR: 6 — SIGNIFICANT CHANGE UP (ref 5–8)
PLATELET # BLD AUTO: 262 K/UL — SIGNIFICANT CHANGE UP (ref 150–400)
POTASSIUM SERPL-MCNC: 4.3 MMOL/L — SIGNIFICANT CHANGE UP (ref 3.5–5.3)
POTASSIUM SERPL-SCNC: 4.3 MMOL/L — SIGNIFICANT CHANGE UP (ref 3.5–5.3)
PROT SERPL-MCNC: 6.6 G/DL — SIGNIFICANT CHANGE UP (ref 6–8.3)
PROT UR-MCNC: NEGATIVE MG/DL — SIGNIFICANT CHANGE UP
RBC # BLD: 4.03 M/UL — SIGNIFICANT CHANGE UP (ref 3.8–5.2)
RBC # FLD: 13.3 % — SIGNIFICANT CHANGE UP (ref 10.3–14.5)
RBC CASTS # UR COMP ASSIST: < 5 /HPF — SIGNIFICANT CHANGE UP
SODIUM SERPL-SCNC: 139 MMOL/L — SIGNIFICANT CHANGE UP (ref 135–145)
SP GR SPEC: 1.02 — SIGNIFICANT CHANGE UP (ref 1–1.03)
UROBILINOGEN FLD QL: 0.2 E.U./DL — SIGNIFICANT CHANGE UP
WBC # BLD: 4.23 K/UL — SIGNIFICANT CHANGE UP (ref 3.8–10.5)
WBC # FLD AUTO: 4.23 K/UL — SIGNIFICANT CHANGE UP (ref 3.8–10.5)
WBC UR QL: < 5 /HPF — SIGNIFICANT CHANGE UP

## 2023-06-08 PROCEDURE — 96374 THER/PROPH/DIAG INJ IV PUSH: CPT

## 2023-06-08 PROCEDURE — 84702 CHORIONIC GONADOTROPIN TEST: CPT

## 2023-06-08 PROCEDURE — 36415 COLL VENOUS BLD VENIPUNCTURE: CPT

## 2023-06-08 PROCEDURE — 99284 EMERGENCY DEPT VISIT MOD MDM: CPT | Mod: 25

## 2023-06-08 PROCEDURE — 80053 COMPREHEN METABOLIC PANEL: CPT

## 2023-06-08 PROCEDURE — 82272 OCCULT BLD FECES 1-3 TESTS: CPT

## 2023-06-08 PROCEDURE — 83690 ASSAY OF LIPASE: CPT

## 2023-06-08 PROCEDURE — 81001 URINALYSIS AUTO W/SCOPE: CPT

## 2023-06-08 PROCEDURE — 85025 COMPLETE CBC W/AUTO DIFF WBC: CPT

## 2023-06-08 PROCEDURE — 99284 EMERGENCY DEPT VISIT MOD MDM: CPT

## 2023-06-08 RX ORDER — SUCRALFATE 1 G
10 TABLET ORAL
Qty: 90 | Refills: 0
Start: 2023-06-08 | End: 2023-06-10

## 2023-06-08 RX ORDER — FAMOTIDINE 10 MG/ML
1 INJECTION INTRAVENOUS
Qty: 14 | Refills: 0
Start: 2023-06-08 | End: 2023-06-14

## 2023-06-08 RX ORDER — SODIUM CHLORIDE 9 MG/ML
1000 INJECTION INTRAMUSCULAR; INTRAVENOUS; SUBCUTANEOUS ONCE
Refills: 0 | Status: COMPLETED | OUTPATIENT
Start: 2023-06-08 | End: 2023-06-08

## 2023-06-08 RX ORDER — FAMOTIDINE 10 MG/ML
20 INJECTION INTRAVENOUS ONCE
Refills: 0 | Status: COMPLETED | OUTPATIENT
Start: 2023-06-08 | End: 2023-06-08

## 2023-06-08 RX ADMIN — FAMOTIDINE 20 MILLIGRAM(S): 10 INJECTION INTRAVENOUS at 10:47

## 2023-06-08 RX ADMIN — Medication 30 MILLILITER(S): at 10:47

## 2023-06-08 RX ADMIN — SODIUM CHLORIDE 1000 MILLILITER(S): 9 INJECTION INTRAMUSCULAR; INTRAVENOUS; SUBCUTANEOUS at 10:47

## 2023-06-08 NOTE — ED ADULT NURSE NOTE - OBJECTIVE STATEMENT
Pt sent to ED by PCP for abdominal pain and dark stool x4 days. Per pt, abdominal pain has gone on for approx 3 weeks, started to notice dark stool, "black mixed with brown" over the past 4 days. Pt take pantoprazole for "reflux" denies hx of Crohns, UC, gastric ulcer or similar symptoms. PSH hiatal hernia repair 6 years ago. Pt is A+Ox4, feels dizzy and "more tired and weak than usual" since symptom onset. Denies SOB, chest pain, palpitations, fever or chills. Endorses nausea, no emesis. Abdomen tender to palpation, soft, with distension. Last BM yesterday. Denies hematuria. Ambulates independently. Pending provider assessment. IV placed.

## 2023-06-08 NOTE — ED PROVIDER NOTE - GASTROINTESTINAL, MLM
Abdomen soft, non-tender, no guarding. no rebound; Rectal: good sphincter tone, + light brown stool in vault

## 2023-06-08 NOTE — ED PROVIDER NOTE - CLINICAL SUMMARY MEDICAL DECISION MAKING FREE TEXT BOX
pt c/o mid upper abd pain and "black" stools x 3 wks, also c/o weakness and fatigue, states hx of gastritis and was on ppi yrs ago, has not gone back to her gi nor seen pmd for this, no acute changes in any symptoms today. well appearing, hemodynamically stable, light brown stool in vault, labs wnl, ? gastritis vs gerd vs PUD, diet modifications and h2 blocker use discussed at length, importance of gi f/u discussed, no emergent indication for any imaging or further labs at this time, pt understands and agrees w/plan, strict return precautions given

## 2023-06-08 NOTE — ED PROVIDER NOTE - NSFOLLOWUPINSTRUCTIONS_ED_ALL_ED_FT
Gastritis, Adult  EAT SMALL, FREQUENT MEALS, AVOID SPICY/FRIED FOODS, AVOID CAFFEINE AND ALCOHOL, FOLLOW UP WITH GASTROENTEROLOGIST   Outline of an adult's lower body with a close-up of the stomach, showing inflammation and an ulcer inside the stomach.  Gastritis is irritation and swelling (inflammation) of the stomach. There are two kinds of gastritis:  Acute gastritis. This kind develops quickly.  Chronic gastritis. This kind is much more common. It develops slowly and lasts for a long time.  It is important to get help for this condition. If you do not get help, your stomach can bleed, and you can get sores (ulcers) in your stomach.  What are the causes?  This condition may be caused by:  Germs that get to your stomach and cause an infection.  Drinking too much alcohol.  Medicines you are taking.  Having too much acid in the stomach.  Having a disease of the stomach.  Other causes may include:  An allergic reaction.  Some cancer treatments (radiation).  Smoking cigarettes or using products that contain nicotine or tobacco.  In some cases, the cause of this condition is not known.  What increases the risk?  Having a disease of the intestines.  Having Crohn's disease.  Using aspirin or ibuprofen and other NSAIDs to treat other conditions.  Stress.  What are the signs or symptoms?  Pain in your stomach.  A burning feeling in your stomach.  Feeling like you may vomit (nauseous).  Vomiting or vomiting blood.  Feeling too full after you eat.  Weight loss.  Bad breath.  Blood in your poop (stool).  In some cases, there are no symptoms.  How is this treated?  This condition is treated with medicines. The medicines that are used depend on what caused the condition. You may be given:  Antibiotic medicine, if your condition was caused by an infection from germs.  H2 blockers and similar medicines, if your condition was caused by too much acid in the stomach.  Treatment may also include stopping the use of certain medicines, such as aspirin or ibuprofen.  Follow these instructions at home:  Medicines  Take over-the-counter and prescription medicines only as told by your doctor.  If you were prescribed an antibiotic medicine, take it as told by your doctor. Do not stop taking it even if you start to feel better.  Alcohol use  Do not drink alcohol if:  Your doctor tells you not to drink.  You are pregnant, may be pregnant, or are planning to become pregnant.  If you drink alcohol:  Limit your use to:  0–1 drink a day for women.  0–2 drinks a day for men.  Know how much alcohol is in your drink. In the [place] one drink equals one 12 oz bottle of beer (355 mL), one 5 oz glass of wine (148 mL), or one 1½ oz glass of hard liquor (44 mL).  General instructions  Three cups showing dark yellow, yellow, and pale yellow pee.  Eat small meals often, instead of large meals.  Avoid foods and drinks that make you feel worse.  Drink enough fluid to keep your pee (urine) pale yellow.  Talk with your doctor about ways to manage stress. You can exercise or do deep breathing, meditation, or yoga.  Do not smoke or use any products that contain nicotine or tobacco. If you need help quitting, ask your doctor.  Keep all follow-up visits.  Contact a doctor if:  Your symptoms get worse.  Your stomach pain gets worse.  Your symptoms go away and then come back.  You have a fever.  Get help right away if:  You vomit blood or something that looks like coffee grounds.  You have black or dark red poop.  You throw up any time you try to drink fluids.  These symptoms may be an emergency. Get help right away. Call your local emergency services (911 in the [place]  Do not wait to see if the symptoms will go away.  Do not drive yourself to the hospital.  Summary  Gastritis is irritation and swelling (inflammation) of the stomach.  You must get help for this condition. If you do not get help, your stomach can bleed, and you can get sores (ulcers) in your stomach.  You can be treated with medicines for germs or medicines to block too much acid in your stomach.

## 2023-06-08 NOTE — ED PROVIDER NOTE - OBJECTIVE STATEMENT
The pt is a 43 y/o F, who presents to ED c/o mid upper abd pain, nausea, fatigue, malaise and "black" stools x 3 wks. Hx of GERD, was on ppi yrs ago, not anymore, had endoscopy few yrs ago, has not gone back to f/u w/gi. Pain to mid upper abd, 4/10, + bloating, + nausea, has not taken any meds for symptoms. No acute changes in symptoms today. Denies fevers, chills, cp, sob, emesis, diarrhea, dizziness, syncope, BRBPR, dysuria.

## 2023-06-08 NOTE — ED PROVIDER NOTE - PATIENT PORTAL LINK FT
You can access the FollowMyHealth Patient Portal offered by [facility] by registering at the following website: [email or web address]. By joining [facility]’s FollowMyHealth portal, you will also be able to view your health information using other applications (apps) compatible with our system.

## 2023-06-08 NOTE — ED ADULT TRIAGE NOTE - OTHER COMPLAINTS
Pt sent from MD office due to possible upper GI bleed?, pt reports abd pain, black stool and nausea for 2 weeks worst in the last 3 days along w dizziness and weakness.

## 2023-06-08 NOTE — ED ADULT NURSE NOTE - LATERALITY
generalized Valtrex Counseling: I discussed with the patient the risks of valacyclovir including but not limited to kidney damage, nausea, vomiting and severe allergy.  The patient understands that if the infection seems to be worsening or is not improving, they are to call. Cyclosporine Pregnancy And Lactation Text: This medication is Pregnancy Category C and it isn't know if it is safe during pregnancy. This medication is excreted in breast milk. Stelara Counseling:  I discussed with the patient the risks of ustekinumab including but not limited to immunosuppression, malignancy, posterior leukoencephalopathy syndrome, and serious infections.  The patient understands that monitoring is required including a PPD at baseline and must alert us or the primary physician if symptoms of infection or other concerning signs are noted. Topical Ketoconazole Pregnancy And Lactation Text: This medication is Pregnancy Category B and is considered safe during pregnancy. It is unknown if it is excreted in breast milk. Skyrizi Pregnancy And Lactation Text: The risk during pregnancy and breastfeeding is uncertain with this medication. Sarecycline Pregnancy And Lactation Text: This medication is Pregnancy Category D and not consider safe during pregnancy. It is also excreted in breast milk. Topical Ketoconazole Counseling: Patient counseled that this medication may cause skin irritation or allergic reactions.  In the event of skin irritation, the patient was advised to reduce the amount of the drug applied or use it less frequently.   The patient verbalized understanding of the proper use and possible adverse effects of ketoconazole.  All of the patient's questions and concerns were addressed. Oral Minoxidil Counseling- I discussed with the patient the risks of oral minoxidil including but not limited to shortness of breath, swelling of the feet or ankles, dizziness, lightheadedness, unwanted hair growth and allergic reaction.  The patient verbalized understanding of the proper use and possible adverse effects of oral minoxidil.  All of the patient's questions and concerns were addressed. Erivedge Counseling- I discussed with the patient the risks of Erivedge including but not limited to nausea, vomiting, diarrhea, constipation, weight loss, changes in the sense of taste, decreased appetite, muscle spasms, and hair loss.  The patient verbalized understanding of the proper use and possible adverse effects of Erivedge.  All of the patient's questions and concerns were addressed. Benzoyl Peroxide Counseling: Patient counseled that medicine may cause skin irritation and bleach clothing.  In the event of skin irritation, the patient was advised to reduce the amount of the drug applied or use it less frequently.   The patient verbalized understanding of the proper use and possible adverse effects of benzoyl peroxide.  All of the patient's questions and concerns were addressed. Cosentyx Pregnancy And Lactation Text: This medication is Pregnancy Category B and is considered safe during pregnancy. It is unknown if this medication is excreted in breast milk. Bactrim Pregnancy And Lactation Text: This medication is Pregnancy Category D and is known to cause fetal risk.  It is also excreted in breast milk. Minoxidil Counseling: Minoxidil is a topical medication which can increase blood flow where it is applied. It is uncertain how this medication increases hair growth. Side effects are uncommon and include stinging and allergic reactions. Cimetidine Counseling:  I discussed with the patient the risks of Cimetidine including but not limited to gynecomastia, headache, diarrhea, nausea, drowsiness, arrhythmias, pancreatitis, skin rashes, psychosis, bone marrow suppression and kidney toxicity. Topical Sulfur Applications Counseling: Topical Sulfur Counseling: Patient counseled that this medication may cause skin irritation or allergic reactions.  In the event of skin irritation, the patient was advised to reduce the amount of the drug applied or use it less frequently.   The patient verbalized understanding of the proper use and possible adverse effects of topical sulfur application.  All of the patient's questions and concerns were addressed. Benzoyl Peroxide Pregnancy And Lactation Text: This medication is Pregnancy Category C. It is unknown if benzoyl peroxide is excreted in breast milk. Tetracycline Counseling: Patient counseled regarding possible photosensitivity and increased risk for sunburn.  Patient instructed to avoid sunlight, if possible.  When exposed to sunlight, patients should wear protective clothing, sunglasses, and sunscreen.  The patient was instructed to call the office immediately if the following severe adverse effects occur:  hearing changes, easy bruising/bleeding, severe headache, or vision changes.  The patient verbalized understanding of the proper use and possible adverse effects of tetracycline.  All of the patient's questions and concerns were addressed. Patient understands to avoid pregnancy while on therapy due to potential birth defects. Minoxidil Pregnancy And Lactation Text: This medication has not been assigned a Pregnancy Risk Category but animal studies failed to show danger with the topical medication. It is unknown if the medication is excreted in breast milk. Erivedge Pregnancy And Lactation Text: This medication is Pregnancy Category X and is absolutely contraindicated during pregnancy. It is unknown if it is excreted in breast milk. Dupixent Counseling: I discussed with the patient the risks of dupilumab including but not limited to eye infection and irritation, cold sores, injection site reactions, worsening of asthma, allergic reactions and increased risk of parasitic infection.  Live vaccines should be avoided while taking dupilumab. Dupilumab will also interact with certain medications such as warfarin and cyclosporine. The patient understands that monitoring is required and they must alert us or the primary physician if symptoms of infection or other concerning signs are noted. Valtrex Pregnancy And Lactation Text: this medication is Pregnancy Category B and is considered safe during pregnancy. This medication is not directly found in breast milk but it's metabolite acyclovir is present. Cephalexin Counseling: I counseled the patient regarding use of cephalexin as an antibiotic for prophylactic and/or therapeutic purposes. Cephalexin (commonly prescribed under brand name Keflex) is a cephalosporin antibiotic which is active against numerous classes of bacteria, including most skin bacteria. Side effects may include nausea, diarrhea, gastrointestinal upset, rash, hives, yeast infections, and in rare cases, hepatitis, kidney disease, seizures, fever, confusion, neurologic symptoms, and others. Patients with severe allergies to penicillin medications are cautioned that there is about a 10% incidence of cross-reactivity with cephalosporins. When possible, patients with penicillin allergies should use alternatives to cephalosporins for antibiotic therapy. Oral Minoxidil Pregnancy And Lactation Text: This medication should only be used when clearly needed if you are pregnant, attempting to become pregnant or breast feeding. Methotrexate Counseling:  Patient counseled regarding adverse effects of methotrexate including but not limited to nausea, vomiting, abnormalities in liver function tests. Patients may develop mouth sores, rash, diarrhea, and abnormalities in blood counts. The patient understands that monitoring is required including LFT's and blood counts.  There is a rare possibility of scarring of the liver and lung problems that can occur when taking methotrexate. Persistent nausea, loss of appetite, pale stools, dark urine, cough, and shortness of breath should be reported immediately. Patient advised to discontinue methotrexate treatment at least three months before attempting to become pregnant.  I discussed the need for folate supplements while taking methotrexate.  These supplements can decrease side effects during methotrexate treatment. The patient verbalized understanding of the proper use and possible adverse effects of methotrexate.  All of the patient's questions and concerns were addressed. Otezla Counseling: The side effects of Otezla were discussed with the patient, including but not limited to worsening or new depression, weight loss, diarrhea, nausea, upper respiratory tract infection, and headache. Patient instructed to call the office should any adverse effect occur.  The patient verbalized understanding of the proper use and possible adverse effects of Otezla.  All the patient's questions and concerns were addressed. Taltz Counseling: I discussed with the patient the risks of ixekizumab including but not limited to immunosuppression, serious infections, worsening of inflammatory bowel disease and drug reactions.  The patient understands that monitoring is required including a PPD at baseline and must alert us or the primary physician if symptoms of infection or other concerning signs are noted. Topical Sulfur Applications Pregnancy And Lactation Text: This medication is Pregnancy Category C and has an unknown safety profile during pregnancy. It is unknown if this topical medication is excreted in breast milk. Enbrel Counseling:  I discussed with the patient the risks of etanercept including but not limited to myelosuppression, immunosuppression, autoimmune hepatitis, demyelinating diseases, lymphoma, and infections.  The patient understands that monitoring is required including a PPD at baseline and must alert us or the primary physician if symptoms of infection or other concerning signs are noted. Clindamycin Counseling: I counseled the patient regarding use of clindamycin as an antibiotic for prophylactic and/or therapeutic purposes. Clindamycin is active against numerous classes of bacteria, including skin bacteria. Side effects may include nausea, diarrhea, gastrointestinal upset, rash, hives, yeast infections, and in rare cases, colitis. Use Enhanced Medication Counseling?: No Mirvaso Pregnancy And Lactation Text: This medication has not been assigned a Pregnancy Risk Category. It is unknown if the medication is excreted in breast milk. Dupixent Pregnancy And Lactation Text: This medication likely crosses the placenta but the risk for the fetus is uncertain. This medication is excreted in breast milk. Mirvaso Counseling: Mirvaso is a topical medication which can decrease superficial blood flow where applied. Side effects are uncommon and include stinging, redness and allergic reactions. Methotrexate Pregnancy And Lactation Text: This medication is Pregnancy Category X and is known to cause fetal harm. This medication is excreted in breast milk. Cephalexin Pregnancy And Lactation Text: This medication is Pregnancy Category B and considered safe during pregnancy.  It is also excreted in breast milk but can be used safely for shorter doses. Finasteride Counseling:  I discussed with the patient the risks of use of finasteride including but not limited to decreased libido, decreased ejaculate volume, gynecomastia, and depression. Women should not handle medication.  All of the patient's questions and concerns were addressed. Cimetidine Pregnancy And Lactation Text: This medication is Pregnancy Category B and is considered safe during pregnancy. It is also excreted in breast milk and breast feeding isn't recommended. Carac Counseling:  I discussed with the patient the risks of Carac including but not limited to erythema, scaling, itching, weeping, crusting, and pain. Picato Counseling:  I discussed with the patient the risks of Picato including but not limited to erythema, scaling, itching, weeping, crusting, and pain. Otezla Pregnancy And Lactation Text: This medication is Pregnancy Category C and it isn't known if it is safe during pregnancy. It is unknown if it is excreted in breast milk. Clindamycin Pregnancy And Lactation Text: This medication can be used in pregnancy if certain situations. Clindamycin is also present in breast milk. Calcipotriene Counseling:  I discussed with the patient the risks of calcipotriene including but not limited to erythema, scaling, itching, and irritation. Carac Pregnancy And Lactation Text: This medication is Pregnancy Category X and contraindicated in pregnancy and in women who may become pregnant. It is unknown if this medication is excreted in breast milk. Doxepin Counseling:  Patient advised that the medication is sedating and not to drive a car after taking this medication. Patient informed of potential adverse effects including but not limited to dry mouth, urinary retention, and blurry vision.  The patient verbalized understanding of the proper use and possible adverse effects of doxepin.  All of the patient's questions and concerns were addressed. Detail Level: Detailed Finasteride Pregnancy And Lactation Text: This medication is absolutely contraindicated during pregnancy. It is unknown if it is excreted in breast milk. Prednisone Counseling:  I discussed with the patient the risks of prolonged use of prednisone including but not limited to weight gain, insomnia, osteoporosis, mood changes, diabetes, susceptibility to infection, glaucoma and high blood pressure.  In cases where prednisone use is prolonged, patients should be monitored with blood pressure checks, serum glucose levels and an eye exam.  Additionally, the patient may need to be placed on GI prophylaxis, PCP prophylaxis, and calcium and vitamin D supplementation and/or a bisphosphonate.  The patient verbalized understanding of the proper use and the possible adverse effects of prednisone.  All of the patient's questions and concerns were addressed. Fluconazole Counseling:  Patient counseled regarding adverse effects of fluconazole including but not limited to headache, diarrhea, nausea, upset stomach, liver function test abnormalities, taste disturbance, and stomach pain.  There is a rare possibility of liver failure that can occur when taking fluconazole.  The patient understands that monitoring of LFTs and kidney function test may be required, especially at baseline. The patient verbalized understanding of the proper use and possible adverse effects of fluconazole.  All of the patient's questions and concerns were addressed. Wartpeel Counseling:  I discussed with the patient the risks of Wartpeel including but not limited to erythema, scaling, itching, weeping, crusting, and pain. Tremfya Counseling: I discussed with the patient the risks of guselkumab including but not limited to immunosuppression, serious infections, and drug reactions.  The patient understands that monitoring is required including a PPD at baseline and must alert us or the primary physician if symptoms of infection or other concerning signs are noted. Humira Counseling:  I discussed with the patient the risks of adalimumab including but not limited to myelosuppression, immunosuppression, autoimmune hepatitis, demyelinating diseases, lymphoma, and serious infections.  The patient understands that monitoring is required including a PPD at baseline and must alert us or the primary physician if symptoms of infection or other concerning signs are noted. Gabapentin Counseling: I discussed with the patient the risks of gabapentin including but not limited to dizziness, somnolence, fatigue and ataxia. Doxycycline Counseling:  Patient counseled regarding possible photosensitivity and increased risk for sunburn.  Patient instructed to avoid sunlight, if possible.  When exposed to sunlight, patients should wear protective clothing, sunglasses, and sunscreen.  The patient was instructed to call the office immediately if the following severe adverse effects occur:  hearing changes, easy bruising/bleeding, severe headache, or vision changes.  The patient verbalized understanding of the proper use and possible adverse effects of doxycycline.  All of the patient's questions and concerns were addressed. Hydroxyzine Counseling: Patient advised that the medication is sedating and not to drive a car after taking this medication.  Patient informed of potential adverse effects including but not limited to dry mouth, urinary retention, and blurry vision.  The patient verbalized understanding of the proper use and possible adverse effects of hydroxyzine.  All of the patient's questions and concerns were addressed. Calcipotriene Pregnancy And Lactation Text: This medication has not been proven safe during pregnancy. It is unknown if this medication is excreted in breast milk. Oxybutynin Counseling:  I discussed with the patient the risks of oxybutynin including but not limited to skin rash, drowsiness, dry mouth, difficulty urinating, and blurred vision. Doxepin Pregnancy And Lactation Text: This medication is Pregnancy Category C and it isn't known if it is safe during pregnancy. It is also excreted in breast milk and breast feeding isn't recommended. Fluconazole Pregnancy And Lactation Text: This medication is Pregnancy Category C and it isn't know if it is safe during pregnancy. It is also excreted in breast milk. Picato Pregnancy And Lactation Text: This medication is Pregnancy Category C. It is unknown if this medication is excreted in breast milk. Griseofulvin Counseling:  I discussed with the patient the risks of griseofulvin including but not limited to photosensitivity, cytopenia, liver damage, nausea/vomiting and severe allergy.  The patient understands that this medication is best absorbed when taken with a fatty meal (e.g., ice cream or french fries). Hydroxyzine Pregnancy And Lactation Text: This medication is not safe during pregnancy and should not be taken. It is also excreted in breast milk and breast feeding isn't recommended. 5-Fu Counseling: 5-Fluorouracil Counseling:  I discussed with the patient the risks of 5-fluorouracil including but not limited to erythema, scaling, itching, weeping, crusting, and pain. Gabapentin Pregnancy And Lactation Text: This medication is Pregnancy Category C and isn't considered safe during pregnancy. It is excreted in breast milk. Winlevi Counseling:  I discussed with the patient the risks of topical clascoterone including but not limited to erythema, scaling, itching, and stinging. Patient voiced their understanding. Propranolol Counseling:  I discussed with the patient the risks of propranolol including but not limited to low heart rate, low blood pressure, low blood sugar, restlessness and increased cold sensitivity. They should call the office if they experience any of these side effects. Protopic Counseling: Patient may experience a mild burning sensation during topical application. Protopic is not approved in children less than 2 years of age. There have been case reports of hematologic and skin malignancies in patients using topical calcineurin inhibitors although causality is questionable. Doxycycline Pregnancy And Lactation Text: This medication is Pregnancy Category D and not consider safe during pregnancy. It is also excreted in breast milk but is considered safe for shorter treatment courses. Ilumya Counseling: I discussed with the patient the risks of tildrakizumab including but not limited to immunosuppression, malignancy, posterior leukoencephalopathy syndrome, and serious infections.  The patient understands that monitoring is required including a PPD at baseline and must alert us or the primary physician if symptoms of infection or other concerning signs are noted. Erythromycin Counseling:  I discussed with the patient the risks of erythromycin including but not limited to GI upset, allergic reaction, drug rash, diarrhea, increase in liver enzymes, and yeast infections. Acitretin Counseling:  I discussed with the patient the risks of acitretin including but not limited to hair loss, dry lips/skin/eyes, liver damage, hyperlipidemia, depression/suicidal ideation, photosensitivity.  Serious rare side effects can include but are not limited to pancreatitis, pseudotumor cerebri, bony changes, clot formation/stroke/heart attack.  Patient understands that alcohol is contraindicated since it can result in liver toxicity and significantly prolong the elimination of the drug by many years. Rhofade Counseling: Rhofade is a topical medication which can decrease superficial blood flow where applied. Side effects are uncommon and include stinging, redness and allergic reactions. Glycopyrrolate Counseling:  I discussed with the patient the risks of glycopyrrolate including but not limited to skin rash, drowsiness, dry mouth, difficulty urinating, and blurred vision. Protopic Pregnancy And Lactation Text: This medication is Pregnancy Category C. It is unknown if this medication is excreted in breast milk when applied topically. Glycopyrrolate Pregnancy And Lactation Text: This medication is Pregnancy Category B and is considered safe during pregnancy. It is unknown if it is excreted breast milk. Winlevi Pregnancy And Lactation Text: This medication is considered safe during pregnancy and breastfeeding. Griseofulvin Pregnancy And Lactation Text: This medication is Pregnancy Category X and is known to cause serious birth defects. It is unknown if this medication is excreted in breast milk but breast feeding should be avoided. Xeljanz Counseling: I discussed with the patient the risks of Xeljanz therapy including increased risk of infection, liver issues, headache, diarrhea, or cold symptoms. Live vaccines should be avoided. They were instructed to call if they have any problems. Propranolol Pregnancy And Lactation Text: This medication is Pregnancy Category C and it isn't known if it is safe during pregnancy. It is excreted in breast milk. Acitretin Pregnancy And Lactation Text: This medication is Pregnancy Category X and should not be given to women who are pregnant or may become pregnant in the future. This medication is excreted in breast milk. Erythromycin Pregnancy And Lactation Text: This medication is Pregnancy Category B and is considered safe during pregnancy. It is also excreted in breast milk. Albendazole Counseling:  I discussed with the patient the risks of albendazole including but not limited to cytopenia, kidney damage, nausea/vomiting and severe allergy.  The patient understands that this medication is being used in an off-label manner. Xolair Counseling:  Patient informed of potential adverse effects including but not limited to fever, muscle aches, rash and allergic reactions.  The patient verbalized understanding of the proper use and possible adverse effects of Xolair.  All of the patient's questions and concerns were addressed. Xelkingaz Pregnancy And Lactation Text: This medication is Pregnancy Category D and is not considered safe during pregnancy.  The risk during breast feeding is also uncertain. Zyclara Counseling:  I discussed with the patient the risks of imiquimod including but not limited to erythema, scaling, itching, weeping, crusting, and pain.  Patient understands that the inflammatory response to imiquimod is variable from person to person and was educated regarded proper titration schedule.  If flu-like symptoms develop, patient knows to discontinue the medication and contact us. Birth Control Pills Counseling: Birth Control Pill Counseling: I discussed with the patient the potential side effects of OCPs including but not limited to increased risk of stroke, heart attack, thrombophlebitis, deep venous thrombosis, hepatic adenomas, breast changes, GI upset, headaches, and depression.  The patient verbalized understanding of the proper use and possible adverse effects of OCPs. All of the patient's questions and concerns were addressed. Hydroxychloroquine Counseling:  I discussed with the patient that a baseline ophthalmologic exam is needed at the start of therapy and every year thereafter while on therapy. A CBC may also be warranted for monitoring.  The side effects of this medication were discussed with the patient, including but not limited to agranulocytosis, aplastic anemia, seizures, rashes, retinopathy, and liver toxicity. Patient instructed to call the office should any adverse effect occur.  The patient verbalized understanding of the proper use and possible adverse effects of Plaquenil.  All the patient's questions and concerns were addressed. Drysol Counseling:  I discussed with the patient the risks of drysol/aluminum chloride including but not limited to skin rash, itching, irritation, burning. Albendazole Pregnancy And Lactation Text: This medication is Pregnancy Category C and it isn't known if it is safe during pregnancy. It is also excreted in breast milk. Xolair Pregnancy And Lactation Text: This medication is Pregnancy Category B and is considered safe during pregnancy. This medication is excreted in breast milk. Arava Counseling:  Patient counseled regarding adverse effects of Arava including but not limited to nausea, vomiting, abnormalities in liver function tests. Patients may develop mouth sores, rash, diarrhea, and abnormalities in blood counts. The patient understands that monitoring is required including LFTs and blood counts.  There is a rare possibility of scarring of the liver and lung problems that can occur when taking methotrexate. Persistent nausea, loss of appetite, pale stools, dark urine, cough, and shortness of breath should be reported immediately. Patient advised to discontinue Arava treatment and consult with a physician prior to attempting conception. The patient will have to undergo a treatment to eliminate Arava from the body prior to conception. Hydroxychloroquine Pregnancy And Lactation Text: This medication has been shown to cause fetal harm but it isn't assigned a Pregnancy Risk Category. There are small amounts excreted in breast milk. Infliximab Counseling:  I discussed with the patient the risks of infliximab including but not limited to myelosuppression, immunosuppression, autoimmune hepatitis, demyelinating diseases, lymphoma, and serious infections.  The patient understands that monitoring is required including a PPD at baseline and must alert us or the primary physician if symptoms of infection or other concerning signs are noted. Drysol Pregnancy And Lactation Text: This medication is considered safe during pregnancy and breast feeding. Metronidazole Counseling:  I discussed with the patient the risks of metronidazole including but not limited to seizures, nausea/vomiting, a metallic taste in the mouth, nausea/vomiting and severe allergy. Birth Control Pills Pregnancy And Lactation Text: This medication should be avoided if pregnant and for the first 30 days post-partum. Bexarotene Counseling:  I discussed with the patient the risks of bexarotene including but not limited to hair loss, dry lips/skin/eyes, liver abnormalities, hyperlipidemia, pancreatitis, depression/suicidal ideation, photosensitivity, drug rash/allergic reactions, hypothyroidism, anemia, leukopenia, infection, cataracts, and teratogenicity.  Patient understands that they will need regular blood tests to check lipid profile, liver function tests, white blood cell count, thyroid function tests and pregnancy test if applicable. Spironolactone Counseling: Patient advised regarding risks of diarrhea, abdominal pain, hyperkalemia, birth defects (for female patients), liver toxicity and renal toxicity. The patient may need blood work to monitor liver and kidney function and potassium levels while on therapy. The patient verbalized understanding of the proper use and possible adverse effects of spironolactone.  All of the patient's questions and concerns were addressed. Opioid Counseling: I discussed with the patient the potential side effects of opioids including but not limited to addiction, altered mental status, and depression. I stressed avoiding alcohol, benzodiazepines, muscle relaxants and sleep aids unless specifically okayed by a physician. The patient verbalized understanding of the proper use and possible adverse effects of opioids. All of the patient's questions and concerns were addressed. They were instructed to flush the remaining pills down the toilet if they did not need them for pain. Minocycline Counseling: Patient advised regarding possible photosensitivity and discoloration of the teeth, skin, lips, tongue and gums.  Patient instructed to avoid sunlight, if possible.  When exposed to sunlight, patients should wear protective clothing, sunglasses, and sunscreen.  The patient was instructed to call the office immediately if the following severe adverse effects occur:  hearing changes, easy bruising/bleeding, severe headache, or vision changes.  The patient verbalized understanding of the proper use and possible adverse effects of minocycline.  All of the patient's questions and concerns were addressed. Rituxan Counseling:  I discussed with the patient the risks of Rituxan infusions. Side effects can include infusion reactions, severe drug rashes including mucocutaneous reactions, reactivation of latent hepatitis and other infections and rarely progressive multifocal leukoencephalopathy.  All of the patient's questions and concerns were addressed. Solaraze Pregnancy And Lactation Text: This medication is Pregnancy Category B and is considered safe. There is some data to suggest avoiding during the third trimester. It is unknown if this medication is excreted in breast milk. Metronidazole Pregnancy And Lactation Text: This medication is Pregnancy Category B and considered safe during pregnancy.  It is also excreted in breast milk. Solaraze Counseling:  I discussed with the patient the risks of Solaraze including but not limited to erythema, scaling, itching, weeping, crusting, and pain. Libtayo Counseling- I discussed with the patient the risks of Libtayo including but not limited to nausea, vomiting, diarrhea, and bone or muscle pain.  The patient verbalized understanding of the proper use and possible adverse effects of Libtayo.  All of the patient's questions and concerns were addressed. Ivermectin Counseling:  Patient instructed to take medication on an empty stomach with a full glass of water.  Patient informed of potential adverse effects including but not limited to nausea, diarrhea, dizziness, itching, and swelling of the extremities or lymph nodes.  The patient verbalized understanding of the proper use and possible adverse effects of ivermectin.  All of the patient's questions and concerns were addressed. Bexarotene Pregnancy And Lactation Text: This medication is Pregnancy Category X and should not be given to women who are pregnant or may become pregnant. This medication should not be used if you are breast feeding. Elidel Counseling: Patient may experience a mild burning sensation during topical application. Elidel is not approved in children less than 2 years of age. There have been case reports of hematologic and skin malignancies in patients using topical calcineurin inhibitors although causality is questionable. Topical Retinoid counseling:  Patient advised to apply a pea-sized amount only at bedtime and wait 30 minutes after washing their face before applying.  If too drying, patient may add a non-comedogenic moisturizer. The patient verbalized understanding of the proper use and possible adverse effects of retinoids.  All of the patient's questions and concerns were addressed. Rituxan Pregnancy And Lactation Text: This medication is Pregnancy Category C and it isn't know if it is safe during pregnancy. It is unknown if this medication is excreted in breast milk but similar antibodies are known to be excreted. Spironolactone Pregnancy And Lactation Text: This medication can cause feminization of the male fetus and should be avoided during pregnancy. The active metabolite is also found in breast milk. Eucrisa Counseling: Patient may experience a mild burning sensation during topical application. Eucrisa is not approved in children less than 2 years of age. Clofazimine Counseling:  I discussed with the patient the risks of clofazimine including but not limited to skin and eye pigmentation, liver damage, nausea/vomiting, gastrointestinal bleeding and allergy. Isotretinoin Counseling: Patient should get monthly blood tests, not donate blood, not drive at night if vision affected, not share medication, and not undergo elective surgery for 6 months after tx completed. Side effects reviewed, pt to contact office should one occur. Libtayo Pregnancy And Lactation Text: This medication is contraindicated in pregnancy and when breast feeding. Opioid Pregnancy And Lactation Text: These medications can lead to premature delivery and should be avoided during pregnancy. These medications are also present in breast milk in small amounts. Azathioprine Counseling:  I discussed with the patient the risks of azathioprine including but not limited to myelosuppression, immunosuppression, hepatotoxicity, lymphoma, and infections.  The patient understands that monitoring is required including baseline LFTs, Creatinine, possible TPMP genotyping and weekly CBCs for the first month and then every 2 weeks thereafter.  The patient verbalized understanding of the proper use and possible adverse effects of azathioprine.  All of the patient's questions and concerns were addressed. Niacinamide Counseling: I recommended taking niacin or niacinamide, also know as vitamin B3, twice daily. Recent evidence suggests that taking vitamin B3 (500 mg twice daily) can reduce the risk of actinic keratoses and non-melanoma skin cancers. Side effects of vitamin B3 include flushing and headache. Siliq Counseling:  I discussed with the patient the risks of Siliq including but not limited to new or worsening depression, suicidal thoughts and behavior, immunosuppression, malignancy, posterior leukoencephalopathy syndrome, and serious infections.  The patient understands that monitoring is required including a PPD at baseline and must alert us or the primary physician if symptoms of infection or other concerning signs are noted. There is also a special program designed to monitor depression which is required with Siliq. Quinolones Counseling:  I discussed with the patient the risks of fluoroquinolones including but not limited to GI upset, allergic reaction, drug rash, diarrhea, dizziness, photosensitivity, yeast infections, liver function test abnormalities, tendonitis/tendon rupture. SSKI Counseling:  I discussed with the patient the risks of SSKI including but not limited to thyroid abnormalities, metallic taste, GI upset, fever, headache, acne, arthralgias, paraesthesias, lymphadenopathy, easy bleeding, arrhythmias, and allergic reaction. Sski Pregnancy And Lactation Text: This medication is Pregnancy Category D and isn't considered safe during pregnancy. It is excreted in breast milk. Azathioprine Pregnancy And Lactation Text: This medication is Pregnancy Category D and isn't considered safe during pregnancy. It is unknown if this medication is excreted in breast milk. Colchicine Counseling:  Patient counseled regarding adverse effects including but not limited to stomach upset (nausea, vomiting, stomach pain, or diarrhea).  Patient instructed to limit alcohol consumption while taking this medication.  Colchicine may reduce blood counts especially with prolonged use.  The patient understands that monitoring of kidney function and blood counts may be required, especially at baseline. The patient verbalized understanding of the proper use and possible adverse effects of colchicine.  All of the patient's questions and concerns were addressed. Isotretinoin Pregnancy And Lactation Text: This medication is Pregnancy Category X and is considered extremely dangerous during pregnancy. It is unknown if it is excreted in breast milk. Itraconazole Counseling:  I discussed with the patient the risks of itraconazole including but not limited to liver damage, nausea/vomiting, neuropathy, and severe allergy.  The patient understands that this medication is best absorbed when taken with acidic beverages such as non-diet cola or ginger ale.  The patient understands that monitoring is required including baseline LFTs and repeat LFTs at intervals.  The patient understands that they are to contact us or the primary physician if concerning signs are noted. Hydroquinone Counseling:  Patient advised that medication may result in skin irritation, lightening (hypopigmentation), dryness, and burning.  In the event of skin irritation, the patient was advised to reduce the amount of the drug applied or use it less frequently.  Rarely, spots that are treated with hydroquinone can become darker (pseudoochronosis).  Should this occur, patient instructed to stop medication and call the office. The patient verbalized understanding of the proper use and possible adverse effects of hydroquinone.  All of the patient's questions and concerns were addressed. Niacinamide Pregnancy And Lactation Text: These medications are considered safe during pregnancy. High Dose Vitamin A Pregnancy And Lactation Text: High dose vitamin A therapy is contraindicated during pregnancy and breast feeding. Ketoconazole Counseling:   Patient counseled regarding improving absorption with orange juice.  Adverse effects include but are not limited to breast enlargement, headache, diarrhea, nausea, upset stomach, liver function test abnormalities, taste disturbance, and stomach pain.  There is a rare possibility of liver failure that can occur when taking ketoconazole. The patient understands that monitoring of LFTs may be required, especially at baseline. The patient verbalized understanding of the proper use and possible adverse effects of ketoconazole.  All of the patient's questions and concerns were addressed. High Dose Vitamin A Counseling: Side effects reviewed, pt to contact office should one occur. Thalidomide Counseling: I discussed with the patient the risks of thalidomide including but not limited to birth defects, anxiety, weakness, chest pain, dizziness, cough and severe allergy. Cellcept Counseling:  I discussed with the patient the risks of mycophenolate mofetil including but not limited to infection/immunosuppression, GI upset, hypokalemia, hypercholesterolemia, bone marrow suppression, lymphoproliferative disorders, malignancy, GI ulceration/bleed/perforation, colitis, interstitial lung disease, kidney failure, progressive multifocal leukoencephalopathy, and birth defects.  The patient understands that monitoring is required including a baseline creatinine and regular CBC testing. In addition, patient must alert us immediately if symptoms of infection or other concerning signs are noted. Tazorac Counseling:  Patient advised that medication is irritating and drying.  Patient may need to apply sparingly and wash off after an hour before eventually leaving it on overnight.  The patient verbalized understanding of the proper use and possible adverse effects of tazorac.  All of the patient's questions and concerns were addressed. Simponi Counseling:  I discussed with the patient the risks of golimumab including but not limited to myelosuppression, immunosuppression, autoimmune hepatitis, demyelinating diseases, lymphoma, and serious infections.  The patient understands that monitoring is required including a PPD at baseline and must alert us or the primary physician if symptoms of infection or other concerning signs are noted. Cimzia Counseling:  I discussed with the patient the risks of Cimzia including but not limited to immunosuppression, allergic reactions and infections.  The patient understands that monitoring is required including a PPD at baseline and must alert us or the primary physician if symptoms of infection or other concerning signs are noted. Azithromycin Counseling:  I discussed with the patient the risks of azithromycin including but not limited to GI upset, allergic reaction, drug rash, diarrhea, and yeast infections. Dapsone Counseling: I discussed with the patient the risks of dapsone including but not limited to hemolytic anemia, agranulocytosis, rashes, methemoglobinemia, kidney failure, peripheral neuropathy, headaches, GI upset, and liver toxicity.  Patients who start dapsone require monitoring including baseline LFTs and weekly CBCs for the first month, then every month thereafter.  The patient verbalized understanding of the proper use and possible adverse effects of dapsone.  All of the patient's questions and concerns were addressed. Rifampin Counseling: I discussed with the patient the risks of rifampin including but not limited to liver damage, kidney damage, red-orange body fluids, nausea/vomiting and severe allergy. Topical Clindamycin Counseling: Patient counseled that this medication may cause skin irritation or allergic reactions.  In the event of skin irritation, the patient was advised to reduce the amount of the drug applied or use it less frequently.   The patient verbalized understanding of the proper use and possible adverse effects of clindamycin.  All of the patient's questions and concerns were addressed. Ketoconazole Pregnancy And Lactation Text: This medication is Pregnancy Category C and it isn't know if it is safe during pregnancy. It is also excreted in breast milk and breast feeding isn't recommended. Nsaids Counseling: NSAID Counseling: I discussed with the patient that NSAIDs should be taken with food. Prolonged use of NSAIDs can result in the development of stomach ulcers.  Patient advised to stop taking NSAIDs if abdominal pain occurs.  The patient verbalized understanding of the proper use and possible adverse effects of NSAIDs.  All of the patient's questions and concerns were addressed. Tazorac Pregnancy And Lactation Text: This medication is not safe during pregnancy. It is unknown if this medication is excreted in breast milk. Nsaids Pregnancy And Lactation Text: These medications are considered safe up to 30 weeks gestation. It is excreted in breast milk. Azelaic Acid Counseling: Patient counseled that medicine may cause skin irritation and to avoid applying near the eyes.  In the event of skin irritation, the patient was advised to reduce the amount of the drug applied or use it less frequently.   The patient verbalized understanding of the proper use and possible adverse effects of azelaic acid.  All of the patient's questions and concerns were addressed. Rifampin Pregnancy And Lactation Text: This medication is Pregnancy Category C and it isn't know if it is safe during pregnancy. It is also excreted in breast milk and should not be used if you are breast feeding. Terbinafine Counseling: Patient counseling regarding adverse effects of terbinafine including but not limited to headache, diarrhea, rash, upset stomach, liver function test abnormalities, itching, taste/smell disturbance, nausea, abdominal pain, and flatulence.  There is a rare possibility of liver failure that can occur when taking terbinafine.  The patient understands that a baseline LFT and kidney function test may be required. The patient verbalized understanding of the proper use and possible adverse effects of terbinafine.  All of the patient's questions and concerns were addressed. Dapsone Pregnancy And Lactation Text: This medication is Pregnancy Category C and is not considered safe during pregnancy or breast feeding. Cyclophosphamide Pregnancy And Lactation Text: This medication is Pregnancy Category D and it isn't considered safe during pregnancy. This medication is excreted in breast milk. Cyclophosphamide Counseling:  I discussed with the patient the risks of cyclophosphamide including but not limited to hair loss, hormonal abnormalities, decreased fertility, abdominal pain, diarrhea, nausea and vomiting, bone marrow suppression and infection. The patient understands that monitoring is required while taking this medication. Tranexamic Acid Counseling:  Patient advised of the small risk of bleeding problems with tranexamic acid. They were also instructed to call if they developed any nausea, vomiting or diarrhea. All of the patient's questions and concerns were addressed. Cimzia Pregnancy And Lactation Text: This medication crosses the placenta but can be considered safe in certain situations. Cimzia may be excreted in breast milk. Odomzo Counseling- I discussed with the patient the risks of Odomzo including but not limited to nausea, vomiting, diarrhea, constipation, weight loss, changes in the sense of taste, decreased appetite, muscle spasms, and hair loss.  The patient verbalized understanding of the proper use and possible adverse effects of Odomzo.  All of the patient's questions and concerns were addressed. Imiquimod Counseling:  I discussed with the patient the risks of imiquimod including but not limited to erythema, scaling, itching, weeping, crusting, and pain.  Patient understands that the inflammatory response to imiquimod is variable from person to person and was educated regarded proper titration schedule.  If flu-like symptoms develop, patient knows to discontinue the medication and contact us. Azithromycin Pregnancy And Lactation Text: This medication is considered safe during pregnancy and is also secreted in breast milk. Cosentyx Counseling:  I discussed with the patient the risks of Cosentyx including but not limited to worsening of Crohn's disease, immunosuppression, allergic reactions and infections.  The patient understands that monitoring is required including a PPD at baseline and must alert us or the primary physician if symptoms of infection or other concerning signs are noted. Bactrim Counseling:  I discussed with the patient the risks of sulfa antibiotics including but not limited to GI upset, allergic reaction, drug rash, diarrhea, dizziness, photosensitivity, and yeast infections.  Rarely, more serious reactions can occur including but not limited to aplastic anemia, agranulocytosis, methemoglobinemia, blood dyscrasias, liver or kidney failure, lung infiltrates or desquamative/blistering drug rashes. Cyclosporine Counseling:  I discussed with the patient the risks of cyclosporine including but not limited to hypertension, gingival hyperplasia,myelosuppression, immunosuppression, liver damage, kidney damage, neurotoxicity, lymphoma, and serious infections. The patient understands that monitoring is required including baseline blood pressure, CBC, CMP, lipid panel and uric acid, and then 1-2 times monthly CMP and blood pressure. Dutasteride Counseling: Dustasteride Counseling:  I discussed with the patient the risks of use of dutasteride including but not limited to decreased libido, decreased ejaculate volume, and gynecomastia. Women who can become pregnant should not handle medication.  All of the patient's questions and concerns were addressed. Dutasteride Pregnancy And Lactation Text: This medication is absolutely contraindicated in women, especially during pregnancy and breast feeding. Feminization of male fetuses is possible if taking while pregnant. Skyrizi Counseling: I discussed with the patient the risks of risankizumab-rzaa including but not limited to immunosuppression, and serious infections.  The patient understands that monitoring is required including a PPD at baseline and must alert us or the primary physician if symptoms of infection or other concerning signs are noted. Sarecycline Counseling: Patient advised regarding possible photosensitivity and discoloration of the teeth, skin, lips, tongue and gums.  Patient instructed to avoid sunlight, if possible.  When exposed to sunlight, patients should wear protective clothing, sunglasses, and sunscreen.  The patient was instructed to call the office immediately if the following severe adverse effects occur:  hearing changes, easy bruising/bleeding, severe headache, or vision changes.  The patient verbalized understanding of the proper use and possible adverse effects of sarecycline.  All of the patient's questions and concerns were addressed. Tranexamic Acid Pregnancy And Lactation Text: It is unknown if this medication is safe during pregnancy or breast feeding.

## 2023-06-08 NOTE — ED PROVIDER NOTE - ATTENDING APP SHARED VISIT CONTRIBUTION OF CARE
The pt is a 41 y/o F, who presents to ED c/o mid upper abd pain, nausea, fatigue, malaise and "black" stools x 3 wks. Hx of GERD, was on ppi yrs ago, not anymore, abd soft, exam nonfocal, will check labs, H/H, reassess.

## 2023-06-08 NOTE — ED ADULT NURSE NOTE - ISOLATION TYPE:
Headaches for past 4 weeks. Pt states it started at night while watching tv. Pt states it then began day and night. States he woke up this morning and his left eye was swollen,  States he also had an episode where his left side was tingling.   States the tingling started at 4am and lasted 2 minutes None

## 2023-11-27 ENCOUNTER — NON-APPOINTMENT (OUTPATIENT)
Age: 43
End: 2023-11-27

## 2023-11-28 ENCOUNTER — APPOINTMENT (OUTPATIENT)
Dept: NEUROSURGERY | Facility: CLINIC | Age: 43
End: 2023-11-28
Payer: MEDICAID

## 2023-11-28 DIAGNOSIS — G93.0 CEREBRAL CYSTS: ICD-10-CM

## 2023-11-28 PROCEDURE — 99442: CPT

## 2023-12-04 ENCOUNTER — APPOINTMENT (OUTPATIENT)
Dept: NEUROSURGERY | Facility: CLINIC | Age: 43
End: 2023-12-04

## 2023-12-11 ENCOUNTER — INPATIENT (INPATIENT)
Facility: HOSPITAL | Age: 43
LOS: 5 days | Discharge: HOME | DRG: 27 | End: 2023-12-17
Attending: NEUROLOGICAL SURGERY | Admitting: NEUROLOGICAL SURGERY
Payer: COMMERCIAL

## 2023-12-11 VITALS
RESPIRATION RATE: 16 BRPM | HEIGHT: 65 IN | OXYGEN SATURATION: 100 % | SYSTOLIC BLOOD PRESSURE: 110 MMHG | TEMPERATURE: 98 F | HEART RATE: 68 BPM | WEIGHT: 210.1 LBS | DIASTOLIC BLOOD PRESSURE: 74 MMHG

## 2023-12-11 DIAGNOSIS — G93.0 CEREBRAL CYSTS: ICD-10-CM

## 2023-12-11 DIAGNOSIS — Z98.89 OTHER SPECIFIED POSTPROCEDURAL STATES: Chronic | ICD-10-CM

## 2023-12-11 LAB
ANION GAP SERPL CALC-SCNC: 9 MMOL/L — SIGNIFICANT CHANGE UP (ref 5–17)
ANION GAP SERPL CALC-SCNC: 9 MMOL/L — SIGNIFICANT CHANGE UP (ref 5–17)
APTT BLD: 34.7 SEC — SIGNIFICANT CHANGE UP (ref 24.5–35.6)
APTT BLD: 34.7 SEC — SIGNIFICANT CHANGE UP (ref 24.5–35.6)
BASOPHILS # BLD AUTO: 0.05 K/UL — SIGNIFICANT CHANGE UP (ref 0–0.2)
BASOPHILS # BLD AUTO: 0.05 K/UL — SIGNIFICANT CHANGE UP (ref 0–0.2)
BASOPHILS NFR BLD AUTO: 0.9 % — SIGNIFICANT CHANGE UP (ref 0–2)
BASOPHILS NFR BLD AUTO: 0.9 % — SIGNIFICANT CHANGE UP (ref 0–2)
BLD GP AB SCN SERPL QL: POSITIVE — SIGNIFICANT CHANGE UP
BUN SERPL-MCNC: 16 MG/DL — SIGNIFICANT CHANGE UP (ref 7–23)
BUN SERPL-MCNC: 16 MG/DL — SIGNIFICANT CHANGE UP (ref 7–23)
CALCIUM SERPL-MCNC: 9.3 MG/DL — SIGNIFICANT CHANGE UP (ref 8.4–10.5)
CALCIUM SERPL-MCNC: 9.3 MG/DL — SIGNIFICANT CHANGE UP (ref 8.4–10.5)
CHLORIDE SERPL-SCNC: 103 MMOL/L — SIGNIFICANT CHANGE UP (ref 96–108)
CHLORIDE SERPL-SCNC: 103 MMOL/L — SIGNIFICANT CHANGE UP (ref 96–108)
CO2 SERPL-SCNC: 26 MMOL/L — SIGNIFICANT CHANGE UP (ref 22–31)
CO2 SERPL-SCNC: 26 MMOL/L — SIGNIFICANT CHANGE UP (ref 22–31)
CREAT SERPL-MCNC: 0.52 MG/DL — SIGNIFICANT CHANGE UP (ref 0.5–1.3)
CREAT SERPL-MCNC: 0.52 MG/DL — SIGNIFICANT CHANGE UP (ref 0.5–1.3)
EGFR: 120 ML/MIN/1.73M2 — SIGNIFICANT CHANGE UP
EGFR: 120 ML/MIN/1.73M2 — SIGNIFICANT CHANGE UP
EOSINOPHIL # BLD AUTO: 0.5 K/UL — SIGNIFICANT CHANGE UP (ref 0–0.5)
EOSINOPHIL # BLD AUTO: 0.5 K/UL — SIGNIFICANT CHANGE UP (ref 0–0.5)
EOSINOPHIL NFR BLD AUTO: 8.9 % — HIGH (ref 0–6)
EOSINOPHIL NFR BLD AUTO: 8.9 % — HIGH (ref 0–6)
GLUCOSE SERPL-MCNC: 91 MG/DL — SIGNIFICANT CHANGE UP (ref 70–99)
GLUCOSE SERPL-MCNC: 91 MG/DL — SIGNIFICANT CHANGE UP (ref 70–99)
HCT VFR BLD CALC: 37.3 % — SIGNIFICANT CHANGE UP (ref 34.5–45)
HCT VFR BLD CALC: 37.3 % — SIGNIFICANT CHANGE UP (ref 34.5–45)
HGB BLD-MCNC: 12.5 G/DL — SIGNIFICANT CHANGE UP (ref 11.5–15.5)
HGB BLD-MCNC: 12.5 G/DL — SIGNIFICANT CHANGE UP (ref 11.5–15.5)
IMM GRANULOCYTES NFR BLD AUTO: 0.2 % — SIGNIFICANT CHANGE UP (ref 0–0.9)
IMM GRANULOCYTES NFR BLD AUTO: 0.2 % — SIGNIFICANT CHANGE UP (ref 0–0.9)
INR BLD: 1.07 — SIGNIFICANT CHANGE UP (ref 0.85–1.18)
INR BLD: 1.07 — SIGNIFICANT CHANGE UP (ref 0.85–1.18)
LYMPHOCYTES # BLD AUTO: 1.24 K/UL — SIGNIFICANT CHANGE UP (ref 1–3.3)
LYMPHOCYTES # BLD AUTO: 1.24 K/UL — SIGNIFICANT CHANGE UP (ref 1–3.3)
LYMPHOCYTES # BLD AUTO: 22.1 % — SIGNIFICANT CHANGE UP (ref 13–44)
LYMPHOCYTES # BLD AUTO: 22.1 % — SIGNIFICANT CHANGE UP (ref 13–44)
MCHC RBC-ENTMCNC: 29.5 PG — SIGNIFICANT CHANGE UP (ref 27–34)
MCHC RBC-ENTMCNC: 29.5 PG — SIGNIFICANT CHANGE UP (ref 27–34)
MCHC RBC-ENTMCNC: 33.5 GM/DL — SIGNIFICANT CHANGE UP (ref 32–36)
MCHC RBC-ENTMCNC: 33.5 GM/DL — SIGNIFICANT CHANGE UP (ref 32–36)
MCV RBC AUTO: 88 FL — SIGNIFICANT CHANGE UP (ref 80–100)
MCV RBC AUTO: 88 FL — SIGNIFICANT CHANGE UP (ref 80–100)
MONOCYTES # BLD AUTO: 0.33 K/UL — SIGNIFICANT CHANGE UP (ref 0–0.9)
MONOCYTES # BLD AUTO: 0.33 K/UL — SIGNIFICANT CHANGE UP (ref 0–0.9)
MONOCYTES NFR BLD AUTO: 5.9 % — SIGNIFICANT CHANGE UP (ref 2–14)
MONOCYTES NFR BLD AUTO: 5.9 % — SIGNIFICANT CHANGE UP (ref 2–14)
NEUTROPHILS # BLD AUTO: 3.47 K/UL — SIGNIFICANT CHANGE UP (ref 1.8–7.4)
NEUTROPHILS # BLD AUTO: 3.47 K/UL — SIGNIFICANT CHANGE UP (ref 1.8–7.4)
NEUTROPHILS NFR BLD AUTO: 62 % — SIGNIFICANT CHANGE UP (ref 43–77)
NEUTROPHILS NFR BLD AUTO: 62 % — SIGNIFICANT CHANGE UP (ref 43–77)
NRBC # BLD: 0 /100 WBCS — SIGNIFICANT CHANGE UP (ref 0–0)
NRBC # BLD: 0 /100 WBCS — SIGNIFICANT CHANGE UP (ref 0–0)
PLATELET # BLD AUTO: 270 K/UL — SIGNIFICANT CHANGE UP (ref 150–400)
PLATELET # BLD AUTO: 270 K/UL — SIGNIFICANT CHANGE UP (ref 150–400)
POTASSIUM SERPL-MCNC: 4.3 MMOL/L — SIGNIFICANT CHANGE UP (ref 3.5–5.3)
POTASSIUM SERPL-MCNC: 4.3 MMOL/L — SIGNIFICANT CHANGE UP (ref 3.5–5.3)
POTASSIUM SERPL-SCNC: 4.3 MMOL/L — SIGNIFICANT CHANGE UP (ref 3.5–5.3)
POTASSIUM SERPL-SCNC: 4.3 MMOL/L — SIGNIFICANT CHANGE UP (ref 3.5–5.3)
PROTHROM AB SERPL-ACNC: 12.2 SEC — SIGNIFICANT CHANGE UP (ref 9.5–13)
PROTHROM AB SERPL-ACNC: 12.2 SEC — SIGNIFICANT CHANGE UP (ref 9.5–13)
RBC # BLD: 4.24 M/UL — SIGNIFICANT CHANGE UP (ref 3.8–5.2)
RBC # BLD: 4.24 M/UL — SIGNIFICANT CHANGE UP (ref 3.8–5.2)
RBC # FLD: 13.2 % — SIGNIFICANT CHANGE UP (ref 10.3–14.5)
RBC # FLD: 13.2 % — SIGNIFICANT CHANGE UP (ref 10.3–14.5)
RH IG SCN BLD-IMP: NEGATIVE — SIGNIFICANT CHANGE UP
SODIUM SERPL-SCNC: 138 MMOL/L — SIGNIFICANT CHANGE UP (ref 135–145)
SODIUM SERPL-SCNC: 138 MMOL/L — SIGNIFICANT CHANGE UP (ref 135–145)
WBC # BLD: 5.6 K/UL — SIGNIFICANT CHANGE UP (ref 3.8–10.5)
WBC # BLD: 5.6 K/UL — SIGNIFICANT CHANGE UP (ref 3.8–10.5)
WBC # FLD AUTO: 5.6 K/UL — SIGNIFICANT CHANGE UP (ref 3.8–10.5)
WBC # FLD AUTO: 5.6 K/UL — SIGNIFICANT CHANGE UP (ref 3.8–10.5)

## 2023-12-11 PROCEDURE — 99285 EMERGENCY DEPT VISIT HI MDM: CPT

## 2023-12-11 PROCEDURE — 72125 CT NECK SPINE W/O DYE: CPT | Mod: 26,MG

## 2023-12-11 PROCEDURE — G1004: CPT

## 2023-12-11 PROCEDURE — 70450 CT HEAD/BRAIN W/O DYE: CPT | Mod: 26,MG

## 2023-12-11 RX ORDER — PANTOPRAZOLE SODIUM 20 MG/1
40 TABLET, DELAYED RELEASE ORAL
Refills: 0 | Status: DISCONTINUED | OUTPATIENT
Start: 2023-12-11 | End: 2023-12-14

## 2023-12-11 RX ORDER — OXYCODONE HYDROCHLORIDE 5 MG/1
5 TABLET ORAL EVERY 4 HOURS
Refills: 0 | Status: DISCONTINUED | OUTPATIENT
Start: 2023-12-11 | End: 2023-12-12

## 2023-12-11 RX ORDER — ACETAMINOPHEN 500 MG
650 TABLET ORAL EVERY 6 HOURS
Refills: 0 | Status: DISCONTINUED | OUTPATIENT
Start: 2023-12-11 | End: 2023-12-11

## 2023-12-11 RX ORDER — ONDANSETRON 8 MG/1
4 TABLET, FILM COATED ORAL EVERY 6 HOURS
Refills: 0 | Status: DISCONTINUED | OUTPATIENT
Start: 2023-12-11 | End: 2023-12-14

## 2023-12-11 RX ORDER — SENNA PLUS 8.6 MG/1
2 TABLET ORAL AT BEDTIME
Refills: 0 | Status: DISCONTINUED | OUTPATIENT
Start: 2023-12-11 | End: 2023-12-14

## 2023-12-11 RX ORDER — ACETAMINOPHEN 500 MG
1000 TABLET ORAL EVERY 8 HOURS
Refills: 0 | Status: DISCONTINUED | OUTPATIENT
Start: 2023-12-11 | End: 2023-12-14

## 2023-12-11 RX ADMIN — Medication 30 MILLILITER(S): at 22:54

## 2023-12-11 RX ADMIN — SENNA PLUS 2 TABLET(S): 8.6 TABLET ORAL at 23:03

## 2023-12-11 NOTE — ED ADULT NURSE NOTE - NSFALLUNIVINTERV_ED_ALL_ED
Bed/Stretcher in lowest position, wheels locked, appropriate side rails in place/Call bell, personal items and telephone in reach/Instruct patient to call for assistance before getting out of bed/chair/stretcher/Non-slip footwear applied when patient is off stretcher/[place] to call system/Physically safe environment - no spills, clutter or unnecessary equipment/Purposeful proactive rounding/Room/bathroom lighting operational, light cord in reach Bed/Stretcher in lowest position, wheels locked, appropriate side rails in place/Call bell, personal items and telephone in reach/Instruct patient to call for assistance before getting out of bed/chair/stretcher/Non-slip footwear applied when patient is off stretcher/[place] to call system/Physically safe environment - no spills, clutter or unnecessary equipment/Purposeful proactive rounding/Room/bathroom lighting operational, light cord in reach

## 2023-12-11 NOTE — ED ADULT TRIAGE NOTE - OTHER COMPLAINTS
"I have a condition called Chiari malformation type 2 and get chronic HA's/pressure but this is different and makes me feel like I am going to pass out if I move my head the wrong way." SHANTEL.

## 2023-12-11 NOTE — ED PROVIDER NOTE - CLINICAL SUMMARY MEDICAL DECISION MAKING FREE TEXT BOX
pt hx of chiari malformation being followed by nsg (dr jones), here c/o ha and neck pain, no worst ha of life, not thunder clap. has been taking tyl w/min relief, pt well appearing, non toxic, non focal neuro exam, nsg consulted and wanting ct head / c spine w/o contrast, pt given percocet for pain, dispo as per nsg

## 2023-12-11 NOTE — H&P ADULT - HISTORY OF PRESENT ILLNESS
43 y/o F with pmh chiari malformation presents to ED c/o persistent worsened headache and neck pain x 2 days. Patient reports that she has experienced headaches intermittently for more than 10 years related to her chiari malformation. The pain typically is elicited with quick head movements or laughing hard but usually resolves. Patient reports that she also has some mild dizziness associated with her recent headache. She has taken tylenol and motrin over the counter for pain without relief. Patient denies nausea, vomiting, vision changes, extremity numbness or weakness, speech changes and confusion. Patient was last seen in office 11/28 and was scheduled for an MRI CSF flow study and MRI cervical spine on 12/14.  41 y/o F with pmh chiari malformation presents to ED c/o persistent worsened headache and neck pain x 2 days. Patient reports that she has experienced headaches intermittently for more than 10 years related to her chiari malformation. The pain typically is elicited with quick head movements or laughing hard but usually resolves. Patient reports that she also has some mild dizziness associated with her recent headache. She has taken tylenol and motrin over the counter for pain without relief. Patient denies nausea, vomiting, vision changes, extremity numbness or weakness, speech changes and confusion. Patient was last seen in office 11/28 and was scheduled for an MRI CSF flow study and MRI cervical spine on 12/14.  43 y/o F with pmh chiari malformation presents to ED c/o persistent worsened headache and neck pain x 2 days. Patient reports that she has experienced headaches intermittently for more than 10 years related to her chiari malformation. The pain typically is elicited with quick head movements or laughing hard but usually resolves. Patient reports that she also has some mild dizziness associated with her recent headache. She has taken tylenol and motrin over the counter for pain without relief. Patient denies nausea, vomiting, vision changes, extremity numbness or weakness, speech changes and confusion.

## 2023-12-11 NOTE — ED ADULT NURSE NOTE - OBJECTIVE STATEMENT
Pt is a 41yo female c/o head/neck pain. Pt states, "I have Chiari malformation which causes pain but it stops when I rest. This pain is worse and has not gotten better over the past 2 days." Pt is A&Ox4, breathing equal and unlabored, denies c/p, sob, f/c, numbness/tingling, NAD. Pt is a 43yo female c/o head/neck pain. Pt states, "I have Chiari malformation which causes pain but it stops when I rest. This pain is worse and has not gotten better over the past 2 days." Pt is A&Ox4, breathing equal and unlabored, denies c/p, sob, f/c, numbness/tingling, NAD.

## 2023-12-11 NOTE — H&P ADULT - NSHPPHYSICALEXAM_GEN_ALL_CORE
General: patient seen laying supine in bed in NAD  Neuro: AAOx3, follows commands, opens eyes spontaneously, speech clear and fluent, CNII-XI grossly intact, face symmetric, no pronator drift, finger to nose intact, strength 5/5 b/l upper extremities and lower extremities, sensation intact to light touch throughout  HEENT: PERRL, EOMI  Neck: supple  Cardiac: RRR, S1S2  Pulmonary: chest rise symmetric  Abdomen: soft, nontender, nondistended  Ext: perfusing well  Skin: warm, dry

## 2023-12-11 NOTE — ED PROVIDER NOTE - OBJECTIVE STATEMENT
The pt is a 43 y/o F, who presents to ED h/a and neck pain x few d. Has been taking tyl w/some relief, pmh of chiari malformation (followed by dr jones - pending surg). Pt states this pain is different from her typical - states more pulling then throbbing, 8/10, has not taken any meds today. Denies visual changes, hearing changes, falls, dizziness, gait disturbances, n/v, fevers, chills. The pt is a 41 y/o F, who presents to ED h/a and neck pain x few d. Has been taking tyl w/some relief, pmh of chiari malformation (followed by dr jones - pending surg). Pt states this pain is different from her typical - states more pulling then throbbing, 8/10, has not taken any meds today. Denies visual changes, hearing changes, falls, dizziness, gait disturbances, n/v, fevers, chills.

## 2023-12-11 NOTE — ED PROVIDER NOTE - ATTENDING APP SHARED VISIT CONTRIBUTION OF CARE
known to neurosurgery for chiari malformation, here w persistent/ increased headache past few days. similar to prior in quality. denies trauma/ fever/ weakness. neuro intact, ambulatory in ed. seen by neurosurgery in the ED. would like to admit for pain control/ further management

## 2023-12-11 NOTE — H&P ADULT - ASSESSMENT
41 y/o F with pmh chiari malformation presents to ED c/o persistent worsened headache and neck pain x 2 days. Admitted to [facility] for further workup.     Plan:  Neuro:  - neuro/vital checks q4  - pain control prn  - pending MRI brain w/ CSF flow and MRI cervical spine  - CTH and CT cervical spine completed    Cardiac:  - SBP goal 100-160    Pulmonary:  - on RA    GI:  - regular diet  - bowel regimen    Renal:  - IVL    Heme:  - SCDs    Endo:  - no issues    ID:  - afebrile    Disposition: Regional status, full code, dispo pending    D/w Dr. Price       41 y/o F with pmh chiari malformation presents to ED c/o persistent worsened headache and neck pain x 2 days. Admitted to [facility] for further workup.     Plan:  Neuro:  - neuro/vital checks q4  - pain control prn  - pending MRI brain w/ CSF flow and MRI cervical spine  - CTH and CT cervical spine completed    Cardiac:  - SBP goal 100-160    Pulmonary:  - on RA    GI:  - regular diet  - bowel regimen    Renal:  - IVL    Heme:  - SCDs    Endo:  - no issues    ID:  - afebrile    Disposition: Regional status, full code, dispo pending    D/w Dr. Price       43 y/o F with pmh chiari malformation presents to ED c/o persistent worsened headache and neck pain x 2 days. Suspect CSF outflow obstruction in posterior fossa resulting in elevated ICP from Chiari Malformation. Admit for further workup.     Plan:  Neuro:  - neuro/vital checks q4  - pain control prn  - pending MRI brain w/ CSF flow and MRI cervical spine  - CTH and CT cervical spine completed    Cardiac:  - SBP goal 100-160    Pulmonary:  - on RA    GI:  - regular diet  - bowel regimen    Renal:  - IVL    Heme:  - SCDs    Endo:  - no issues    ID:  - afebrile    Disposition: Regional status, full code, dispo pending    D/w Dr. Price

## 2023-12-12 LAB
ANION GAP SERPL CALC-SCNC: 9 MMOL/L — SIGNIFICANT CHANGE UP (ref 5–17)
ANION GAP SERPL CALC-SCNC: 9 MMOL/L — SIGNIFICANT CHANGE UP (ref 5–17)
BLD GP AB SCN SERPL QL: POSITIVE — SIGNIFICANT CHANGE UP
BLD GP AB SCN SERPL QL: POSITIVE — SIGNIFICANT CHANGE UP
BUN SERPL-MCNC: 14 MG/DL — SIGNIFICANT CHANGE UP (ref 7–23)
BUN SERPL-MCNC: 14 MG/DL — SIGNIFICANT CHANGE UP (ref 7–23)
CALCIUM SERPL-MCNC: 9.1 MG/DL — SIGNIFICANT CHANGE UP (ref 8.4–10.5)
CALCIUM SERPL-MCNC: 9.1 MG/DL — SIGNIFICANT CHANGE UP (ref 8.4–10.5)
CHLORIDE SERPL-SCNC: 104 MMOL/L — SIGNIFICANT CHANGE UP (ref 96–108)
CHLORIDE SERPL-SCNC: 104 MMOL/L — SIGNIFICANT CHANGE UP (ref 96–108)
CO2 SERPL-SCNC: 25 MMOL/L — SIGNIFICANT CHANGE UP (ref 22–31)
CO2 SERPL-SCNC: 25 MMOL/L — SIGNIFICANT CHANGE UP (ref 22–31)
CREAT SERPL-MCNC: 0.62 MG/DL — SIGNIFICANT CHANGE UP (ref 0.5–1.3)
CREAT SERPL-MCNC: 0.62 MG/DL — SIGNIFICANT CHANGE UP (ref 0.5–1.3)
EGFR: 115 ML/MIN/1.73M2 — SIGNIFICANT CHANGE UP
EGFR: 115 ML/MIN/1.73M2 — SIGNIFICANT CHANGE UP
GLUCOSE SERPL-MCNC: 95 MG/DL — SIGNIFICANT CHANGE UP (ref 70–99)
GLUCOSE SERPL-MCNC: 95 MG/DL — SIGNIFICANT CHANGE UP (ref 70–99)
HCG SERPL-ACNC: <0 MIU/ML — SIGNIFICANT CHANGE UP
HCG SERPL-ACNC: <0 MIU/ML — SIGNIFICANT CHANGE UP
HCT VFR BLD CALC: 36.6 % — SIGNIFICANT CHANGE UP (ref 34.5–45)
HCT VFR BLD CALC: 36.6 % — SIGNIFICANT CHANGE UP (ref 34.5–45)
HGB BLD-MCNC: 12.2 G/DL — SIGNIFICANT CHANGE UP (ref 11.5–15.5)
HGB BLD-MCNC: 12.2 G/DL — SIGNIFICANT CHANGE UP (ref 11.5–15.5)
MAGNESIUM SERPL-MCNC: 2 MG/DL — SIGNIFICANT CHANGE UP (ref 1.6–2.6)
MAGNESIUM SERPL-MCNC: 2 MG/DL — SIGNIFICANT CHANGE UP (ref 1.6–2.6)
MCHC RBC-ENTMCNC: 29.3 PG — SIGNIFICANT CHANGE UP (ref 27–34)
MCHC RBC-ENTMCNC: 29.3 PG — SIGNIFICANT CHANGE UP (ref 27–34)
MCHC RBC-ENTMCNC: 33.3 GM/DL — SIGNIFICANT CHANGE UP (ref 32–36)
MCHC RBC-ENTMCNC: 33.3 GM/DL — SIGNIFICANT CHANGE UP (ref 32–36)
MCV RBC AUTO: 87.8 FL — SIGNIFICANT CHANGE UP (ref 80–100)
MCV RBC AUTO: 87.8 FL — SIGNIFICANT CHANGE UP (ref 80–100)
NRBC # BLD: 0 /100 WBCS — SIGNIFICANT CHANGE UP (ref 0–0)
NRBC # BLD: 0 /100 WBCS — SIGNIFICANT CHANGE UP (ref 0–0)
PHOSPHATE SERPL-MCNC: 3.7 MG/DL — SIGNIFICANT CHANGE UP (ref 2.5–4.5)
PHOSPHATE SERPL-MCNC: 3.7 MG/DL — SIGNIFICANT CHANGE UP (ref 2.5–4.5)
PLATELET # BLD AUTO: 248 K/UL — SIGNIFICANT CHANGE UP (ref 150–400)
PLATELET # BLD AUTO: 248 K/UL — SIGNIFICANT CHANGE UP (ref 150–400)
POTASSIUM SERPL-MCNC: 4 MMOL/L — SIGNIFICANT CHANGE UP (ref 3.5–5.3)
POTASSIUM SERPL-MCNC: 4 MMOL/L — SIGNIFICANT CHANGE UP (ref 3.5–5.3)
POTASSIUM SERPL-SCNC: 4 MMOL/L — SIGNIFICANT CHANGE UP (ref 3.5–5.3)
POTASSIUM SERPL-SCNC: 4 MMOL/L — SIGNIFICANT CHANGE UP (ref 3.5–5.3)
RBC # BLD: 4.17 M/UL — SIGNIFICANT CHANGE UP (ref 3.8–5.2)
RBC # BLD: 4.17 M/UL — SIGNIFICANT CHANGE UP (ref 3.8–5.2)
RBC # FLD: 13.1 % — SIGNIFICANT CHANGE UP (ref 10.3–14.5)
RBC # FLD: 13.1 % — SIGNIFICANT CHANGE UP (ref 10.3–14.5)
RH IG SCN BLD-IMP: NEGATIVE — SIGNIFICANT CHANGE UP
RH IG SCN BLD-IMP: NEGATIVE — SIGNIFICANT CHANGE UP
SODIUM SERPL-SCNC: 138 MMOL/L — SIGNIFICANT CHANGE UP (ref 135–145)
SODIUM SERPL-SCNC: 138 MMOL/L — SIGNIFICANT CHANGE UP (ref 135–145)
WBC # BLD: 4.81 K/UL — SIGNIFICANT CHANGE UP (ref 3.8–10.5)
WBC # BLD: 4.81 K/UL — SIGNIFICANT CHANGE UP (ref 3.8–10.5)
WBC # FLD AUTO: 4.81 K/UL — SIGNIFICANT CHANGE UP (ref 3.8–10.5)
WBC # FLD AUTO: 4.81 K/UL — SIGNIFICANT CHANGE UP (ref 3.8–10.5)

## 2023-12-12 PROCEDURE — 99231 SBSQ HOSP IP/OBS SF/LOW 25: CPT

## 2023-12-12 PROCEDURE — 99233 SBSQ HOSP IP/OBS HIGH 50: CPT

## 2023-12-12 PROCEDURE — 72156 MRI NECK SPINE W/O & W/DYE: CPT | Mod: 26

## 2023-12-12 PROCEDURE — 70553 MRI BRAIN STEM W/O & W/DYE: CPT | Mod: 26

## 2023-12-12 RX ORDER — TRAMADOL HYDROCHLORIDE 50 MG/1
25 TABLET ORAL EVERY 6 HOURS
Refills: 0 | Status: DISCONTINUED | OUTPATIENT
Start: 2023-12-12 | End: 2023-12-14

## 2023-12-12 RX ORDER — TRAMADOL HYDROCHLORIDE 50 MG/1
50 TABLET ORAL EVERY 6 HOURS
Refills: 0 | Status: DISCONTINUED | OUTPATIENT
Start: 2023-12-12 | End: 2023-12-14

## 2023-12-12 RX ORDER — METHOCARBAMOL 500 MG/1
500 TABLET, FILM COATED ORAL THREE TIMES A DAY
Refills: 0 | Status: DISCONTINUED | OUTPATIENT
Start: 2023-12-12 | End: 2023-12-14

## 2023-12-12 RX ADMIN — OXYCODONE HYDROCHLORIDE 5 MILLIGRAM(S): 5 TABLET ORAL at 06:05

## 2023-12-12 RX ADMIN — OXYCODONE HYDROCHLORIDE 5 MILLIGRAM(S): 5 TABLET ORAL at 05:05

## 2023-12-12 RX ADMIN — PANTOPRAZOLE SODIUM 40 MILLIGRAM(S): 20 TABLET, DELAYED RELEASE ORAL at 06:10

## 2023-12-12 RX ADMIN — Medication 1000 MILLIGRAM(S): at 19:01

## 2023-12-12 RX ADMIN — Medication 1000 MILLIGRAM(S): at 18:01

## 2023-12-12 NOTE — PROGRESS NOTE ADULT - SUBJECTIVE AND OBJECTIVE BOX
HOSPITALIST INITIAL CONSULT NOTE    CHIEF COMPLAINT:      HPI:  43 y/o F with pmh chiari malformation presents to ED c/o persistent worsened headache and neck pain x 2 days. Patient reports that she has experienced headaches intermittently for more than 10 years related to her chiari malformation. The pain typically is elicited with quick head movements or laughing hard but usually resolves. Patient reports that she also has some mild dizziness associated with her recent headache. She has taken tylenol and motrin over the counter for pain without relief. Patient denies nausea, vomiting, vision changes, extremity numbness or weakness, speech changes and confusion. Patient was last seen in office 11/28 and was scheduled for an MRI CSF flow study and MRI cervical spine on 12/14.  (11 Dec 2023 18:40)      PAST MEDICAL & SURGICAL HISTORY:  No pertinent past medical history      Arachnoid cyst      H/O breast surgery          REVIEW OF SYSTEMS:  As per HPI, otherwise negative for Constitutional, Eyes, Ears/Nose/Mouth/Throat, Neck, Cardiovascular, Respiratory, Gastrointestinal, Genitourinary, Skin, Endocrine, Musculoskeletal, Psychiatric, and Hematologic/Lymphatic.    MEDICATIONS  (STANDING):  pantoprazole    Tablet 40 milliGRAM(s) Oral before breakfast  senna 2 Tablet(s) Oral at bedtime    MEDICATIONS  (PRN):  acetaminophen     Tablet .. 1000 milliGRAM(s) Oral every 8 hours PRN Temp greater or equal to 38.5C (101.3F), Mild Pain (1 - 3)  aluminum hydroxide/magnesium hydroxide/simethicone Suspension 30 milliLiter(s) Oral every 6 hours PRN Dyspepsia  methocarbamol 500 milliGRAM(s) Oral three times a day PRN Muscle Spasm  ondansetron Injectable 4 milliGRAM(s) IV Push every 6 hours PRN Nausea and/or Vomiting  oxyCODONE    IR 5 milliGRAM(s) Oral every 4 hours PRN Severe Pain (7 - 10)      Allergies    No Known Allergies    Intolerances        FAMILY HISTORY:      Social History:      VITALS  Vital Signs Last 24 Hrs  T(C): 36.6 (12 Dec 2023 09:17), Max: 36.8 (11 Dec 2023 18:28)  T(F): 97.8 (12 Dec 2023 09:17), Max: 98.2 (11 Dec 2023 18:28)  HR: 70 (12 Dec 2023 09:17) (55 - 70)  BP: 105/69 (12 Dec 2023 09:17) (96/61 - 112/67)  BP(mean): --  RR: 16 (12 Dec 2023 09:17) (16 - 18)  SpO2: 96% (12 Dec 2023 09:17) (95% - 100%)    Parameters below as of 12 Dec 2023 09:17  Patient On (Oxygen Delivery Method): room air        I&O's Summary      CAPILLARY BLOOD GLUCOSE          PHYSICAL EXAM  General: A&Ox3; NAD  Head: NC/AT; PERRL; EOMI; anicteric sclera  Neck: Supple; no JVD  Respiratory: CTA B/L; no wheezes/crackles/rales auscultated w/ good air movement  Cardiovascular: Regular rhythm/rate; S1/S2; no gallops or murmurs auscultated  Gastrointestinal: Soft; NTND w/out rebound tenderness or guarding; bowel sounds normal  Extremities: WWP; no edema or cyanosis; radial/pedal pulses palpable  Neurological:  CNII-XII grossly intact; no obvious focal deficits  Skin: No rashes noted  Vasc: +2 DP/PT pulses b/l   Psych: Appropriate Affect    LABS:                        12.2   4.81  )-----------( 248      ( 12 Dec 2023 05:02 )             36.6     12-12    138  |  104  |  14  ----------------------------<  95  4.0   |  25  |  0.62    Ca    9.1      12 Dec 2023 05:02  Phos  3.7     12-12  Mg     2.0     12-12      PT/INR - ( 11 Dec 2023 18:34 )   PT: 12.2 sec;   INR: 1.07          PTT - ( 11 Dec 2023 18:34 )  PTT:34.7 sec  Urinalysis Basic - ( 12 Dec 2023 05:02 )    Color: x / Appearance: x / SG: x / pH: x  Gluc: 95 mg/dL / Ketone: x  / Bili: x / Urobili: x   Blood: x / Protein: x / Nitrite: x   Leuk Esterase: x / RBC: x / WBC x   Sq Epi: x / Non Sq Epi: x / Bacteria: x        RADIOLOGY & ADDITIONAL STUDIES:  reviewed     HOSPITALIST INITIAL CONSULT NOTE    CHIEF COMPLAINT:      HPI:  41 y/o F with pmh chiari malformation presents to ED c/o persistent worsened headache and neck pain x 2 days. Patient reports that she has experienced headaches intermittently for more than 10 years related to her chiari malformation. The pain typically is elicited with quick head movements or laughing hard but usually resolves. Patient reports that she also has some mild dizziness associated with her recent headache. She has taken tylenol and motrin over the counter for pain without relief. Patient denies nausea, vomiting, vision changes, extremity numbness or weakness, speech changes and confusion. Patient was last seen in office 11/28 and was scheduled for an MRI CSF flow study and MRI cervical spine on 12/14.  (11 Dec 2023 18:40)      PAST MEDICAL & SURGICAL HISTORY:  No pertinent past medical history      Arachnoid cyst      H/O breast surgery          REVIEW OF SYSTEMS:  As per HPI, otherwise negative for Constitutional, Eyes, Ears/Nose/Mouth/Throat, Neck, Cardiovascular, Respiratory, Gastrointestinal, Genitourinary, Skin, Endocrine, Musculoskeletal, Psychiatric, and Hematologic/Lymphatic.    MEDICATIONS  (STANDING):  pantoprazole    Tablet 40 milliGRAM(s) Oral before breakfast  senna 2 Tablet(s) Oral at bedtime    MEDICATIONS  (PRN):  acetaminophen     Tablet .. 1000 milliGRAM(s) Oral every 8 hours PRN Temp greater or equal to 38.5C (101.3F), Mild Pain (1 - 3)  aluminum hydroxide/magnesium hydroxide/simethicone Suspension 30 milliLiter(s) Oral every 6 hours PRN Dyspepsia  methocarbamol 500 milliGRAM(s) Oral three times a day PRN Muscle Spasm  ondansetron Injectable 4 milliGRAM(s) IV Push every 6 hours PRN Nausea and/or Vomiting  oxyCODONE    IR 5 milliGRAM(s) Oral every 4 hours PRN Severe Pain (7 - 10)      Allergies    No Known Allergies    Intolerances        FAMILY HISTORY:      Social History:      VITALS  Vital Signs Last 24 Hrs  T(C): 36.6 (12 Dec 2023 09:17), Max: 36.8 (11 Dec 2023 18:28)  T(F): 97.8 (12 Dec 2023 09:17), Max: 98.2 (11 Dec 2023 18:28)  HR: 70 (12 Dec 2023 09:17) (55 - 70)  BP: 105/69 (12 Dec 2023 09:17) (96/61 - 112/67)  BP(mean): --  RR: 16 (12 Dec 2023 09:17) (16 - 18)  SpO2: 96% (12 Dec 2023 09:17) (95% - 100%)    Parameters below as of 12 Dec 2023 09:17  Patient On (Oxygen Delivery Method): room air        I&O's Summary      CAPILLARY BLOOD GLUCOSE          PHYSICAL EXAM  General: A&Ox3; NAD  Head: NC/AT; PERRL; EOMI; anicteric sclera  Neck: Supple; no JVD  Respiratory: CTA B/L; no wheezes/crackles/rales auscultated w/ good air movement  Cardiovascular: Regular rhythm/rate; S1/S2; no gallops or murmurs auscultated  Gastrointestinal: Soft; NTND w/out rebound tenderness or guarding; bowel sounds normal  Extremities: WWP; no edema or cyanosis; radial/pedal pulses palpable  Neurological:  CNII-XII grossly intact; no obvious focal deficits  Skin: No rashes noted  Vasc: +2 DP/PT pulses b/l   Psych: Appropriate Affect    LABS:                        12.2   4.81  )-----------( 248      ( 12 Dec 2023 05:02 )             36.6     12-12    138  |  104  |  14  ----------------------------<  95  4.0   |  25  |  0.62    Ca    9.1      12 Dec 2023 05:02  Phos  3.7     12-12  Mg     2.0     12-12      PT/INR - ( 11 Dec 2023 18:34 )   PT: 12.2 sec;   INR: 1.07          PTT - ( 11 Dec 2023 18:34 )  PTT:34.7 sec  Urinalysis Basic - ( 12 Dec 2023 05:02 )    Color: x / Appearance: x / SG: x / pH: x  Gluc: 95 mg/dL / Ketone: x  / Bili: x / Urobili: x   Blood: x / Protein: x / Nitrite: x   Leuk Esterase: x / RBC: x / WBC x   Sq Epi: x / Non Sq Epi: x / Bacteria: x        RADIOLOGY & ADDITIONAL STUDIES:  reviewed

## 2023-12-12 NOTE — PROGRESS NOTE ADULT - ASSESSMENT
43 y/o F with pmh chiari malformation presents to ED c/o persistent worsened headache and neck pain x 2 days. Admitted to [facility] for further workup.     Chiari Malformation  -Plan for further MRI   -OR planning  -Would stop codeine for headache control     Pre-Op Eval  RCRI 0, Class I, 3.9% CV Risk   METS>10  optimized for any planned procedure without further testing     41 y/o F with pmh chiari malformation presents to ED c/o persistent worsened headache and neck pain x 2 days. Admitted to [facility] for further workup.     Chiari Malformation  -Plan for further MRI   -OR planning  -Would stop codeine for headache control     Pre-Op Eval  RCRI 0, Class I, 3.9% CV Risk   METS>10  optimized for any planned procedure without further testing

## 2023-12-12 NOTE — PROGRESS NOTE ADULT - SUBJECTIVE AND OBJECTIVE BOX
HPI:  41 y/o F with pmh chiari malformation presents to ED c/o persistent worsened headache and neck pain x 2 days. Patient reports that she has experienced headaches intermittently for more than 10 years related to her chiari malformation. The pain typically is elicited with quick head movements or laughing hard but usually resolves. Patient reports that she also has some mild dizziness associated with her recent headache. She has taken tylenol and motrin over the counter for pain without relief. Patient denies nausea, vomiting, vision changes, extremity numbness or weakness, speech changes and confusion. Patient was last seen in office 11/28 and was scheduled for an MRI CSF flow study and MRI cervical spine on 12/14.  (11 Dec 2023 18:40)    INTERVAL EVENTS:  c/o HA and feeling of head "heaviness"    HOSPITAL COURSE:  12/11: adm, CTH/CT C spine complete  12/12: MRI pending    Vital Signs Last 24 Hrs  T(C): 36.5 (11 Dec 2023 20:53), Max: 36.8 (11 Dec 2023 18:28)  T(F): 97.7 (11 Dec 2023 20:53), Max: 98.2 (11 Dec 2023 18:28)  HR: 55 (11 Dec 2023 20:53) (55 - 68)  BP: 106/73 (11 Dec 2023 20:53) (106/73 - 112/67)  BP(mean): --  RR: 17 (11 Dec 2023 20:53) (16 - 17)  SpO2: 95% (11 Dec 2023 20:53) (95% - 100%)    Parameters below as of 11 Dec 2023 20:53  Patient On (Oxygen Delivery Method): room air        I&O's Summary      PHYSICAL EXAM:  General: NAD, pt is comfortably sitting up in bed, on room air  HEENT: CN II-XII intact, PERRL 3mm briskly reactive, EOMI b/l, face symmetric, tongue midline, neck FROM  Cardiovascular: RRR, normal S1 and S2   Respiratory: lungs CTAB, no wheezing, rhonchi, or crackles   GI: normoactive BS to auscultation, abd soft, NTND   Neuro: A&Ox3, No aphasia, speech clear, no dysmetria, no pronator drift. Follows commands.  SAM x4 spontaneously, 5/5 strength in all extremities throughout. Sensation intact throughout.   Extremities: distal pulses 2+ x4        LABS:                        12.5   5.60  )-----------( 270      ( 11 Dec 2023 18:34 )             37.3     12-11    138  |  103  |  16  ----------------------------<  91  4.3   |  26  |  0.52    Ca    9.3      11 Dec 2023 18:34      PT/INR - ( 11 Dec 2023 18:34 )   PT: 12.2 sec;   INR: 1.07          PTT - ( 11 Dec 2023 18:34 )  PTT:34.7 sec  Urinalysis Basic - ( 11 Dec 2023 18:34 )    Color: x / Appearance: x / SG: x / pH: x  Gluc: 91 mg/dL / Ketone: x  / Bili: x / Urobili: x   Blood: x / Protein: x / Nitrite: x   Leuk Esterase: x / RBC: x / WBC x   Sq Epi: x / Non Sq Epi: x / Bacteria: x          CAPILLARY BLOOD GLUCOSE          Drug Levels: [] N/A    CSF Analysis: [] N/A      Allergies    No Known Allergies    Intolerances      MEDICATIONS:  Antibiotics:    Neuro:  acetaminophen     Tablet .. 1000 milliGRAM(s) Oral every 8 hours PRN  ondansetron Injectable 4 milliGRAM(s) IV Push every 6 hours PRN  oxyCODONE    IR 5 milliGRAM(s) Oral every 4 hours PRN    Anticoagulation:    OTHER:  aluminum hydroxide/magnesium hydroxide/simethicone Suspension 30 milliLiter(s) Oral every 6 hours PRN  pantoprazole    Tablet 40 milliGRAM(s) Oral before breakfast  senna 2 Tablet(s) Oral at bedtime    IVF:    CULTURES:    RADIOLOGY & ADDITIONAL TESTS:      ASSESSMENT:  41 y/o F with PMH chiari malformation presents to ED c/o persistent worsened headache and neck pain x 2 days. Admitted to [facility] for further workup.     Plan:  Neuro:  - neuro/vital checks q4  - pain control prn  - pending MRI brain w/ CSF flow and MRI cervical spine  - CTH and CT cervical spine completed    Cardiac:  - SBP goal 100-160    Pulmonary:  - on RA    GI:  - regular diet  - bowel regimen  - pantoprazole for hx gastritis    Renal:  - IVL, voiding    Heme:  - SCDs    Endo:  - no issues    ID:  - afebrile    Disposition: Regional status, full code, dispo pending possible OR    D/w Dr. Price   HPI:  43 y/o F with pmh chiari malformation presents to ED c/o persistent worsened headache and neck pain x 2 days. Patient reports that she has experienced headaches intermittently for more than 10 years related to her chiari malformation. The pain typically is elicited with quick head movements or laughing hard but usually resolves. Patient reports that she also has some mild dizziness associated with her recent headache. She has taken tylenol and motrin over the counter for pain without relief. Patient denies nausea, vomiting, vision changes, extremity numbness or weakness, speech changes and confusion. Patient was last seen in office 11/28 and was scheduled for an MRI CSF flow study and MRI cervical spine on 12/14.  (11 Dec 2023 18:40)    INTERVAL EVENTS:  c/o HA and feeling of head "heaviness"    HOSPITAL COURSE:  12/11: adm, CTH/CT C spine complete  12/12: MRI pending    Vital Signs Last 24 Hrs  T(C): 36.5 (11 Dec 2023 20:53), Max: 36.8 (11 Dec 2023 18:28)  T(F): 97.7 (11 Dec 2023 20:53), Max: 98.2 (11 Dec 2023 18:28)  HR: 55 (11 Dec 2023 20:53) (55 - 68)  BP: 106/73 (11 Dec 2023 20:53) (106/73 - 112/67)  BP(mean): --  RR: 17 (11 Dec 2023 20:53) (16 - 17)  SpO2: 95% (11 Dec 2023 20:53) (95% - 100%)    Parameters below as of 11 Dec 2023 20:53  Patient On (Oxygen Delivery Method): room air        I&O's Summary      PHYSICAL EXAM:  General: NAD, pt is comfortably sitting up in bed, on room air  HEENT: CN II-XII intact, PERRL 3mm briskly reactive, EOMI b/l, face symmetric, tongue midline, neck FROM  Cardiovascular: RRR, normal S1 and S2   Respiratory: lungs CTAB, no wheezing, rhonchi, or crackles   GI: normoactive BS to auscultation, abd soft, NTND   Neuro: A&Ox3, No aphasia, speech clear, no dysmetria, no pronator drift. Follows commands.  SAM x4 spontaneously, 5/5 strength in all extremities throughout. Sensation intact throughout.   Extremities: distal pulses 2+ x4        LABS:                        12.5   5.60  )-----------( 270      ( 11 Dec 2023 18:34 )             37.3     12-11    138  |  103  |  16  ----------------------------<  91  4.3   |  26  |  0.52    Ca    9.3      11 Dec 2023 18:34      PT/INR - ( 11 Dec 2023 18:34 )   PT: 12.2 sec;   INR: 1.07          PTT - ( 11 Dec 2023 18:34 )  PTT:34.7 sec  Urinalysis Basic - ( 11 Dec 2023 18:34 )    Color: x / Appearance: x / SG: x / pH: x  Gluc: 91 mg/dL / Ketone: x  / Bili: x / Urobili: x   Blood: x / Protein: x / Nitrite: x   Leuk Esterase: x / RBC: x / WBC x   Sq Epi: x / Non Sq Epi: x / Bacteria: x          CAPILLARY BLOOD GLUCOSE          Drug Levels: [] N/A    CSF Analysis: [] N/A      Allergies    No Known Allergies    Intolerances      MEDICATIONS:  Antibiotics:    Neuro:  acetaminophen     Tablet .. 1000 milliGRAM(s) Oral every 8 hours PRN  ondansetron Injectable 4 milliGRAM(s) IV Push every 6 hours PRN  oxyCODONE    IR 5 milliGRAM(s) Oral every 4 hours PRN    Anticoagulation:    OTHER:  aluminum hydroxide/magnesium hydroxide/simethicone Suspension 30 milliLiter(s) Oral every 6 hours PRN  pantoprazole    Tablet 40 milliGRAM(s) Oral before breakfast  senna 2 Tablet(s) Oral at bedtime    IVF:    CULTURES:    RADIOLOGY & ADDITIONAL TESTS:      ASSESSMENT:  43 y/o F with PMH chiari malformation presents to ED c/o persistent worsened headache and neck pain x 2 days. Admitted to [facility] for further workup.     Plan:  Neuro:  - neuro/vital checks q4  - pain control prn  - pending MRI brain w/ CSF flow and MRI cervical spine  - CTH and CT cervical spine completed    Cardiac:  - SBP goal 100-160    Pulmonary:  - on RA    GI:  - regular diet  - bowel regimen  - pantoprazole for hx gastritis    Renal:  - IVL, voiding    Heme:  - SCDs    Endo:  - no issues    ID:  - afebrile    Disposition: Regional status, full code, dispo pending possible OR    D/w Dr. Price

## 2023-12-13 ENCOUNTER — TRANSCRIPTION ENCOUNTER (OUTPATIENT)
Age: 43
End: 2023-12-13

## 2023-12-13 PROBLEM — G93.0 CEREBRAL CYSTS: Chronic | Status: ACTIVE | Noted: 2023-12-11

## 2023-12-13 LAB
ANION GAP SERPL CALC-SCNC: 11 MMOL/L — SIGNIFICANT CHANGE UP (ref 5–17)
ANION GAP SERPL CALC-SCNC: 11 MMOL/L — SIGNIFICANT CHANGE UP (ref 5–17)
APTT BLD: 34.2 SEC — SIGNIFICANT CHANGE UP (ref 24.5–35.6)
APTT BLD: 34.2 SEC — SIGNIFICANT CHANGE UP (ref 24.5–35.6)
BUN SERPL-MCNC: 15 MG/DL — SIGNIFICANT CHANGE UP (ref 7–23)
BUN SERPL-MCNC: 15 MG/DL — SIGNIFICANT CHANGE UP (ref 7–23)
CALCIUM SERPL-MCNC: 9 MG/DL — SIGNIFICANT CHANGE UP (ref 8.4–10.5)
CALCIUM SERPL-MCNC: 9 MG/DL — SIGNIFICANT CHANGE UP (ref 8.4–10.5)
CHLORIDE SERPL-SCNC: 107 MMOL/L — SIGNIFICANT CHANGE UP (ref 96–108)
CHLORIDE SERPL-SCNC: 107 MMOL/L — SIGNIFICANT CHANGE UP (ref 96–108)
CO2 SERPL-SCNC: 23 MMOL/L — SIGNIFICANT CHANGE UP (ref 22–31)
CO2 SERPL-SCNC: 23 MMOL/L — SIGNIFICANT CHANGE UP (ref 22–31)
CREAT SERPL-MCNC: 0.53 MG/DL — SIGNIFICANT CHANGE UP (ref 0.5–1.3)
CREAT SERPL-MCNC: 0.53 MG/DL — SIGNIFICANT CHANGE UP (ref 0.5–1.3)
EGFR: 120 ML/MIN/1.73M2 — SIGNIFICANT CHANGE UP
EGFR: 120 ML/MIN/1.73M2 — SIGNIFICANT CHANGE UP
GLUCOSE SERPL-MCNC: 95 MG/DL — SIGNIFICANT CHANGE UP (ref 70–99)
GLUCOSE SERPL-MCNC: 95 MG/DL — SIGNIFICANT CHANGE UP (ref 70–99)
HCT VFR BLD CALC: 37.6 % — SIGNIFICANT CHANGE UP (ref 34.5–45)
HCT VFR BLD CALC: 37.6 % — SIGNIFICANT CHANGE UP (ref 34.5–45)
HGB BLD-MCNC: 12.4 G/DL — SIGNIFICANT CHANGE UP (ref 11.5–15.5)
HGB BLD-MCNC: 12.4 G/DL — SIGNIFICANT CHANGE UP (ref 11.5–15.5)
INR BLD: 1.04 — SIGNIFICANT CHANGE UP (ref 0.85–1.18)
INR BLD: 1.04 — SIGNIFICANT CHANGE UP (ref 0.85–1.18)
MAGNESIUM SERPL-MCNC: 2.1 MG/DL — SIGNIFICANT CHANGE UP (ref 1.6–2.6)
MAGNESIUM SERPL-MCNC: 2.1 MG/DL — SIGNIFICANT CHANGE UP (ref 1.6–2.6)
MCHC RBC-ENTMCNC: 29.2 PG — SIGNIFICANT CHANGE UP (ref 27–34)
MCHC RBC-ENTMCNC: 29.2 PG — SIGNIFICANT CHANGE UP (ref 27–34)
MCHC RBC-ENTMCNC: 33 GM/DL — SIGNIFICANT CHANGE UP (ref 32–36)
MCHC RBC-ENTMCNC: 33 GM/DL — SIGNIFICANT CHANGE UP (ref 32–36)
MCV RBC AUTO: 88.5 FL — SIGNIFICANT CHANGE UP (ref 80–100)
MCV RBC AUTO: 88.5 FL — SIGNIFICANT CHANGE UP (ref 80–100)
NRBC # BLD: 0 /100 WBCS — SIGNIFICANT CHANGE UP (ref 0–0)
NRBC # BLD: 0 /100 WBCS — SIGNIFICANT CHANGE UP (ref 0–0)
PHOSPHATE SERPL-MCNC: 3.4 MG/DL — SIGNIFICANT CHANGE UP (ref 2.5–4.5)
PHOSPHATE SERPL-MCNC: 3.4 MG/DL — SIGNIFICANT CHANGE UP (ref 2.5–4.5)
PLATELET # BLD AUTO: 229 K/UL — SIGNIFICANT CHANGE UP (ref 150–400)
PLATELET # BLD AUTO: 229 K/UL — SIGNIFICANT CHANGE UP (ref 150–400)
POTASSIUM SERPL-MCNC: 4 MMOL/L — SIGNIFICANT CHANGE UP (ref 3.5–5.3)
POTASSIUM SERPL-MCNC: 4 MMOL/L — SIGNIFICANT CHANGE UP (ref 3.5–5.3)
POTASSIUM SERPL-SCNC: 4 MMOL/L — SIGNIFICANT CHANGE UP (ref 3.5–5.3)
POTASSIUM SERPL-SCNC: 4 MMOL/L — SIGNIFICANT CHANGE UP (ref 3.5–5.3)
PROTHROM AB SERPL-ACNC: 11.8 SEC — SIGNIFICANT CHANGE UP (ref 9.5–13)
PROTHROM AB SERPL-ACNC: 11.8 SEC — SIGNIFICANT CHANGE UP (ref 9.5–13)
RBC # BLD: 4.25 M/UL — SIGNIFICANT CHANGE UP (ref 3.8–5.2)
RBC # BLD: 4.25 M/UL — SIGNIFICANT CHANGE UP (ref 3.8–5.2)
RBC # FLD: 12.8 % — SIGNIFICANT CHANGE UP (ref 10.3–14.5)
RBC # FLD: 12.8 % — SIGNIFICANT CHANGE UP (ref 10.3–14.5)
SODIUM SERPL-SCNC: 141 MMOL/L — SIGNIFICANT CHANGE UP (ref 135–145)
SODIUM SERPL-SCNC: 141 MMOL/L — SIGNIFICANT CHANGE UP (ref 135–145)
WBC # BLD: 4.6 K/UL — SIGNIFICANT CHANGE UP (ref 3.8–10.5)
WBC # BLD: 4.6 K/UL — SIGNIFICANT CHANGE UP (ref 3.8–10.5)
WBC # FLD AUTO: 4.6 K/UL — SIGNIFICANT CHANGE UP (ref 3.8–10.5)
WBC # FLD AUTO: 4.6 K/UL — SIGNIFICANT CHANGE UP (ref 3.8–10.5)

## 2023-12-13 PROCEDURE — 71045 X-RAY EXAM CHEST 1 VIEW: CPT | Mod: 26

## 2023-12-13 PROCEDURE — 99232 SBSQ HOSP IP/OBS MODERATE 35: CPT

## 2023-12-13 PROCEDURE — 99231 SBSQ HOSP IP/OBS SF/LOW 25: CPT

## 2023-12-13 RX ORDER — APREPITANT 80 MG/1
40 CAPSULE ORAL ONCE
Refills: 0 | Status: COMPLETED | OUTPATIENT
Start: 2023-12-14 | End: 2023-12-14

## 2023-12-13 RX ORDER — SODIUM CHLORIDE 9 MG/ML
1000 INJECTION INTRAMUSCULAR; INTRAVENOUS; SUBCUTANEOUS
Refills: 0 | Status: DISCONTINUED | OUTPATIENT
Start: 2023-12-13 | End: 2023-12-14

## 2023-12-13 RX ORDER — ACETAMINOPHEN 500 MG
1000 TABLET ORAL ONCE
Refills: 0 | Status: COMPLETED | OUTPATIENT
Start: 2023-12-14 | End: 2023-12-14

## 2023-12-13 RX ORDER — CHLORHEXIDINE GLUCONATE 213 G/1000ML
1 SOLUTION TOPICAL EVERY 12 HOURS
Refills: 0 | Status: COMPLETED | OUTPATIENT
Start: 2023-12-13 | End: 2023-12-14

## 2023-12-13 RX ORDER — INFLUENZA VIRUS VACCINE 15; 15; 15; 15 UG/.5ML; UG/.5ML; UG/.5ML; UG/.5ML
0.5 SUSPENSION INTRAMUSCULAR ONCE
Refills: 0 | Status: DISCONTINUED | OUTPATIENT
Start: 2023-12-13 | End: 2023-12-17

## 2023-12-13 RX ORDER — POVIDONE-IODINE 5 %
1 AEROSOL (ML) TOPICAL ONCE
Refills: 0 | Status: COMPLETED | OUTPATIENT
Start: 2023-12-14 | End: 2023-12-14

## 2023-12-13 RX ORDER — CELECOXIB 200 MG/1
200 CAPSULE ORAL ONCE
Refills: 0 | Status: COMPLETED | OUTPATIENT
Start: 2023-12-14 | End: 2023-12-14

## 2023-12-13 RX ADMIN — PANTOPRAZOLE SODIUM 40 MILLIGRAM(S): 20 TABLET, DELAYED RELEASE ORAL at 05:51

## 2023-12-13 RX ADMIN — CHLORHEXIDINE GLUCONATE 1 APPLICATION(S): 213 SOLUTION TOPICAL at 17:22

## 2023-12-13 RX ADMIN — TRAMADOL HYDROCHLORIDE 25 MILLIGRAM(S): 50 TABLET ORAL at 12:41

## 2023-12-13 RX ADMIN — TRAMADOL HYDROCHLORIDE 25 MILLIGRAM(S): 50 TABLET ORAL at 11:41

## 2023-12-13 NOTE — PATIENT PROFILE ADULT - FALL HARM RISK - UNIVERSAL INTERVENTIONS
Bed in lowest position, wheels locked, appropriate side rails in place/Call bell, personal items and telephone in reach/Instruct patient to call for assistance before getting out of bed or chair/Non-slip footwear when patient is out of bed/[place] to call system/Physically safe environment - no spills, clutter or unnecessary equipment/Purposeful Proactive Rounding/Room/bathroom lighting operational, light cord in reach Bed in lowest position, wheels locked, appropriate side rails in place/Call bell, personal items and telephone in reach/Instruct patient to call for assistance before getting out of bed or chair/Non-slip footwear when patient is out of bed/[place] to call system/Physically safe environment - no spills, clutter or unnecessary equipment/Purposeful Proactive Rounding/Room/bathroom lighting operational, light cord in reach

## 2023-12-13 NOTE — PROGRESS NOTE ADULT - ASSESSMENT
41 y/o F with pmh chiari malformation presents to ED c/o persistent worsened headache and neck pain x 2 days. Admitted to [facility] for further workup.     Chiari Malformation  -Plan for further MRI   -OR planning  -Would stop codeine for headache control     Pre-Op Eval  RCRI 0, Class I, 3.9% CV Risk   METS>10  optimized for any planned procedure without further testing     43 y/o F with pmh chiari malformation presents to ED c/o persistent worsened headache and neck pain x 2 days. Admitted to [facility] for further workup.     Chiari Malformation  -Plan for further MRI   -OR planning  -Would stop codeine for headache control     Pre-Op Eval  RCRI 0, Class I, 3.9% CV Risk   METS>10  optimized for any planned procedure without further testing

## 2023-12-13 NOTE — PROGRESS NOTE ADULT - SUBJECTIVE AND OBJECTIVE BOX
HPI:  41 y/o F with pmh chiari malformation presents to ED c/o persistent worsened headache and neck pain x 2 days. Patient reports that she has experienced headaches intermittently for more than 10 years related to her chiari malformation. The pain typically is elicited with quick head movements or laughing hard but usually resolves. Patient reports that she also has some mild dizziness associated with her recent headache. She has taken tylenol and motrin over the counter for pain without relief. Patient denies nausea, vomiting, vision changes, extremity numbness or weakness, speech changes and confusion. Patient was last seen in office 11/28 and was scheduled for an MRI CSF flow study and MRI cervical spine on 12/14.  (11 Dec 2023 18:40)    INTERVAL EVENTS:      HOSPITAL COURSE:  12/11: adm, CTH/CT C spine complete  12/12: JOSAFAT overnight. MRI c-spine / MR brain w/ CSF flow pending, robaxin helping w/ neck stiffness, has medical clearance for chiari decompression Thursday 12/14. MRI read Chiari I malformation w tonsillar herniation       Vital Signs Last 24 Hrs  T(C): 36.6 (12 Dec 2023 21:13), Max: 36.9 (12 Dec 2023 17:05)  T(F): 97.8 (12 Dec 2023 21:13), Max: 98.5 (12 Dec 2023 17:05)  HR: 68 (12 Dec 2023 21:13) (62 - 79)  BP: 118/73 (12 Dec 2023 21:13) (96/61 - 118/73)  BP(mean): --  RR: 18 (12 Dec 2023 21:13) (16 - 18)  SpO2: 98% (12 Dec 2023 21:13) (96% - 98%)    Parameters below as of 12 Dec 2023 21:13  Patient On (Oxygen Delivery Method): room air        I&O's Summary    12 Dec 2023 07:01  -  13 Dec 2023 00:23  --------------------------------------------------------  IN: 480 mL / OUT: 0 mL / NET: 480 mL        PHYSICAL EXAM:    General: NAD, pt is comfortably sitting up in bed, on room air  HEENT: CN II-XII intact, PERRL 3mm briskly reactive, EOMI b/l, face symmetric, tongue midline, neck FROM  Cardiovascular: RRR, normal S1 and S2   Respiratory: lungs CTAB, no wheezing, rhonchi, or crackles   GI: normoactive BS to auscultation, abd soft, NTND   Neuro: A&Ox3, No aphasia, speech clear, no dysmetria, no pronator drift. Follows commands.  SAM x4 spontaneously, 5/5 strength in all extremities throughout. Sensation intact throughout.   Extremities: distal pulses 2+ x4    LABS:                        12.2   4.81  )-----------( 248      ( 12 Dec 2023 05:02 )             36.6     12-12    138  |  104  |  14  ----------------------------<  95  4.0   |  25  |  0.62    Ca    9.1      12 Dec 2023 05:02  Phos  3.7     12-12  Mg     2.0     12-12      PT/INR - ( 11 Dec 2023 18:34 )   PT: 12.2 sec;   INR: 1.07          PTT - ( 11 Dec 2023 18:34 )  PTT:34.7 sec  Urinalysis Basic - ( 12 Dec 2023 05:02 )    Color: x / Appearance: x / SG: x / pH: x  Gluc: 95 mg/dL / Ketone: x  / Bili: x / Urobili: x   Blood: x / Protein: x / Nitrite: x   Leuk Esterase: x / RBC: x / WBC x   Sq Epi: x / Non Sq Epi: x / Bacteria: x          CAPILLARY BLOOD GLUCOSE          Drug Levels: [] N/A    CSF Analysis: [] N/A      Allergies    No Known Allergies    Intolerances      MEDICATIONS:  Antibiotics:    Neuro:  acetaminophen     Tablet .. 1000 milliGRAM(s) Oral every 8 hours PRN  methocarbamol 500 milliGRAM(s) Oral three times a day PRN  ondansetron Injectable 4 milliGRAM(s) IV Push every 6 hours PRN  traMADol 25 milliGRAM(s) Oral every 6 hours PRN  traMADol 50 milliGRAM(s) Oral every 6 hours PRN    Anticoagulation:    OTHER:  aluminum hydroxide/magnesium hydroxide/simethicone Suspension 30 milliLiter(s) Oral every 6 hours PRN  pantoprazole    Tablet 40 milliGRAM(s) Oral before breakfast  senna 2 Tablet(s) Oral at bedtime    IVF:    CULTURES:    RADIOLOGY & ADDITIONAL TESTS:      ASSESSMENT:  41 y/o F with PMH chiari malformation presents to ED c/o persistent worsened headache and neck pain x 2 days. Plan for OR 12/14.     Plan:  Neuro:  - neuro/vital checks q4  - pain control prn  - MRI brain w/ CSF flow and MRI cervical spine 12/12: Chiari 1 malformation, no CSF flow obstruction  - CTH and CT cervical spine completed  - Chiari decompression scheduled 12/14    Cardiac:  - SBP goal 100-160    Pulmonary:  - on RA    GI:  - regular diet  - bowel regimen  - pantoprazole for hx gastritis    Renal:  - IVL, voiding    Heme:  - SCDs  - pt ambulatory, no need for DVT chemoppx    Endo:  - no issues    ID:  - afebrile    Disposition: Regional status, full code, dispo pending OR 12/14    D/w Dr. Price

## 2023-12-13 NOTE — PROGRESS NOTE ADULT - SUBJECTIVE AND OBJECTIVE BOX
Surgery:   Consent: Signed by patient vs HCP                   NAME/NUMBER of HCP:     No Known Allergies      OVERNIGHT EVENTS:    T(C): 36.9 (12-13-23 @ 15:50), Max: 36.9 (12-12-23 @ 17:05)  HR: 72 (12-13-23 @ 15:50) (64 - 79)  BP: 137/83 (12-13-23 @ 15:50) (107/64 - 137/83)  RR: 17 (12-13-23 @ 15:50) (16 - 18)  SpO2: 99% (12-13-23 @ 15:50) (97% - 99%)  Wt(kg): --    EXAM:      12-13    141  |  107  |  15  ----------------------------<  95  4.0   |  23  |  0.53    Ca    9.0      13 Dec 2023 06:31  Phos  3.4     12-13  Mg     2.1     12-13      CBC Full  -  ( 13 Dec 2023 06:31 )  WBC Count : 4.60 K/uL  RBC Count : 4.25 M/uL  Hemoglobin : 12.4 g/dL  Hematocrit : 37.6 %  Platelet Count - Automated : 229 K/uL  Mean Cell Volume : 88.5 fl  Mean Cell Hemoglobin : 29.2 pg  Mean Cell Hemoglobin Concentration : 33.0 gm/dL  Auto Neutrophil # : x  Auto Lymphocyte # : x  Auto Monocyte # : x  Auto Eosinophil # : x  Auto Basophil # : x  Auto Neutrophil % : x  Auto Lymphocyte % : x  Auto Monocyte % : x  Auto Eosinophil % : x  Auto Basophil % : x    PT/INR - ( 13 Dec 2023 10:44 )   PT: 11.8 sec;   INR: 1.04          PTT - ( 13 Dec 2023 10:44 )  PTT:34.2 sec    Pregnancy test:  Type & Screen (in past 72hrs):    CXR:  EKG:  ECHO:  Medical Clearances:  Other Clearances:     Last dose of antiplatelet/anticoagulation drug:    Implanted Devices (pacemaker, drug pump...etc):  []YES   [] NO                  If yes --> EPS consulted to interrogate/adjust device:    3M nasal swab ordered?  _Y  _N  Cranial surgery: Order written for hair to be shampooed night before surgery  [] yes   []no                 Assessment:    Plan:    Assessment:  Present when checked    []  GCS  E   V  M     Heart Failure: []Acute, [] acute on chronic , []chronic  Heart Failure:  [] Diastolic (HFpEF), [] Systolic (HFrEF), []Combined (HFpEF and HFrEF), [] RHF, [] Pulm HTN, [] Other    [] KANWAL, [] ATN, [] AIN, [] other  [] CKD1, [] CKD2, [] CKD 3, [] CKD 4, [] CKD 5, []ESRD    Encephalopathy: [] Metabolic, [] Hepatic, [] toxic, [] Neurological, [] Other    Abnormal Nurtitional Status: [] malnurtition (see nutrition note), [ ]underweight: BMI < 19, [] morbid obesity: BMI >40, [] Cachexia    [] Sepsis  [] hypovolemic shock,[] cardiogenic shock, [] hemorrhagic shock, [] neuogenic shock  [] Acute Respiratory Failure  []Cerebral edema, [] Brain compression/ herniation,   [] Functional quadriplegia  [] Acute blood loss anemia   Surgery: suboccipital craniectomy and C1 laminectomy for chiari decompression   Consent: Signed by patient      No Known Allergies      OVERNIGHT EVENTS: JOSAFAT overnight.     T(C): 36.9 (12-13-23 @ 15:50), Max: 36.9 (12-12-23 @ 17:05)  HR: 72 (12-13-23 @ 15:50) (64 - 79)  BP: 137/83 (12-13-23 @ 15:50) (107/64 - 137/83)  RR: 17 (12-13-23 @ 15:50) (16 - 18)  SpO2: 99% (12-13-23 @ 15:50) (97% - 99%)  Wt(kg): --    EXAM:  General: NAD, pt is comfortably sitting up in bed, on room air  HEENT: CN II-XII intact, PERRL 3mm briskly reactive, EOMI b/l, face symmetric, tongue midline, neck FROM  Cardiovascular: RRR  Respiratory: non-labored breathing on RA; chest rise symmetric  GI: abd soft, NTND   Neuro: A&Ox3, No aphasia, speech clear, no dysmetria, no pronator drift. Follows commands.  SAM x4 spontaneously, 5/5 strength in all extremities throughout. Sensation intact  Extremities: distal pulses 2+ x4      12-13    141  |  107  |  15  ----------------------------<  95  4.0   |  23  |  0.53    Ca    9.0      13 Dec 2023 06:31  Phos  3.4     12-13  Mg     2.1     12-13      CBC Full  -  ( 13 Dec 2023 06:31 )  WBC Count : 4.60 K/uL  RBC Count : 4.25 M/uL  Hemoglobin : 12.4 g/dL  Hematocrit : 37.6 %  Platelet Count - Automated : 229 K/uL  Mean Cell Volume : 88.5 fl  Mean Cell Hemoglobin : 29.2 pg  Mean Cell Hemoglobin Concentration : 33.0 gm/dL  Auto Neutrophil # : x  Auto Lymphocyte # : x  Auto Monocyte # : x  Auto Eosinophil # : x  Auto Basophil # : x  Auto Neutrophil % : x  Auto Lymphocyte % : x  Auto Monocyte % : x  Auto Eosinophil % : x  Auto Basophil % : x    PT/INR - ( 13 Dec 2023 10:44 )   PT: 11.8 sec;   INR: 1.04          PTT - ( 13 Dec 2023 10:44 )  PTT:34.2 sec    Pregnancy test: negative   Type & Screen (in past 72hrs): yes     CXR: pending  EKG:  ___________________   ECHO: N/A  Medical Clearances: complete per Dr. Berrios   Other Clearances: N/A    Last dose of antiplatelet/anticoagulation drug: N/A    Implanted Devices (pacemaker, drug pump...etc):  []YES   [x] NO                  If yes --> EPS consulted to interrogate/adjust device:    3M nasal swab ordered?  x_Y  _N  Cranial surgery: Order written for hair to be shampooed night before surgery  [] yes   []no                 Assessment:    Plan:    Assessment:  Present when checked    []  GCS  E   V  M     Heart Failure: []Acute, [] acute on chronic , []chronic  Heart Failure:  [] Diastolic (HFpEF), [] Systolic (HFrEF), []Combined (HFpEF and HFrEF), [] RHF, [] Pulm HTN, [] Other    [] KANWAL, [] ATN, [] AIN, [] other  [] CKD1, [] CKD2, [] CKD 3, [] CKD 4, [] CKD 5, []ESRD    Encephalopathy: [] Metabolic, [] Hepatic, [] toxic, [] Neurological, [] Other    Abnormal Nurtitional Status: [] malnurtition (see nutrition note), [ ]underweight: BMI < 19, [] morbid obesity: BMI >40, [] Cachexia    [] Sepsis  [] hypovolemic shock,[] cardiogenic shock, [] hemorrhagic shock, [] neuogenic shock  [] Acute Respiratory Failure  []Cerebral edema, [] Brain compression/ herniation,   [] Functional quadriplegia  [] Acute blood loss anemia   Surgery: suboccipital craniectomy and C1 laminectomy for chiari decompression   Consent: Signed by patient and put in chart    No Known Allergies      OVERNIGHT EVENTS: JOSAFAT overnight.     T(C): 36.9 (12-13-23 @ 15:50), Max: 36.9 (12-12-23 @ 17:05)  HR: 72 (12-13-23 @ 15:50) (64 - 79)  BP: 137/83 (12-13-23 @ 15:50) (107/64 - 137/83)  RR: 17 (12-13-23 @ 15:50) (16 - 18)  SpO2: 99% (12-13-23 @ 15:50) (97% - 99%)  Wt(kg): --    EXAM:  General: NAD, pt is comfortably sitting up in bed, on room air  HEENT: CN II-XII intact, PERRL 3mm briskly reactive, EOMI b/l, face symmetric, tongue midline, neck FROM  Cardiovascular: RRR  Respiratory: non-labored breathing on RA; chest rise symmetric  GI: abd soft, NTND   Neuro: A&Ox3, No aphasia, speech clear, no dysmetria, no pronator drift. Follows commands.  SAM x4 spontaneously, 5/5 strength in all extremities throughout. Sensation intact throughout.   Extremities: distal pulses 2+ x4      12-13    141  |  107  |  15  ----------------------------<  95  4.0   |  23  |  0.53    Ca    9.0      13 Dec 2023 06:31  Phos  3.4     12-13  Mg     2.1     12-13      CBC Full  -  ( 13 Dec 2023 06:31 )  WBC Count : 4.60 K/uL  RBC Count : 4.25 M/uL  Hemoglobin : 12.4 g/dL  Hematocrit : 37.6 %  Platelet Count - Automated : 229 K/uL  Mean Cell Volume : 88.5 fl  Mean Cell Hemoglobin : 29.2 pg  Mean Cell Hemoglobin Concentration : 33.0 gm/dL  Auto Neutrophil # : x  Auto Lymphocyte # : x  Auto Monocyte # : x  Auto Eosinophil # : x  Auto Basophil # : x  Auto Neutrophil % : x  Auto Lymphocyte % : x  Auto Monocyte % : x  Auto Eosinophil % : x  Auto Basophil % : x    PT/INR - ( 13 Dec 2023 10:44 )   PT: 11.8 sec;   INR: 1.04          PTT - ( 13 Dec 2023 10:44 )  PTT:34.2 sec    Pregnancy test: negative   Type & Screen (in past 72hrs): yes     CXR: complete, pending read  EKG:  pending  ECHO: N/A  Medical Clearances: complete per Dr. Berrios   Other Clearances: N/A    Last dose of antiplatelet/anticoagulation drug: N/A    Implanted Devices (pacemaker, drug pump...etc):  []YES   [x] NO                  If yes --> EPS consulted to interrogate/adjust device:    3M nasal swab ordered?  x_Y  _N  Cranial surgery: Order written for hair to be shampooed night before surgery  [x] yes   []no                 Assessment: 43 y/o F with PMH chiari malformation presents to ED c/o persistent worsened headache and neck pain x 2 days. Plan for suboccipital craniectomy and C1 laminectomy for chiari decompression w Dr. Price (12/14)    Plan:  Neuro:  - neuro/vital checks q4  - pain control prn  - MRI brain w/ CSF flow and MRI cervical spine 12/12: Chiari 1 malformation, no CSF flow obstruction  - CTH and CT cervical spine completed  - Chiari decompression scheduled 12/14    Cardiac:  - SBP goal 100-160  - pre-op EKG read pending     Pulmonary:  - on RA  - pre-op CXR complete, read pending     GI:  - NPO after midnight for OR  - bowel regimen  - pantoprazole for hx gastritis    Renal:  - IVF while NPO  - voiding    Heme:  - SCDs DVT ppx (holding AC preop)    Endo:  - no issues    ID:  - afebrile    Disposition: Regional status, full code, dispo pending OR 12/14    D/w Dr. Price    Assessment:  Present when checked    []  GCS  E   V  M     Heart Failure: []Acute, [] acute on chronic , []chronic  Heart Failure:  [] Diastolic (HFpEF), [] Systolic (HFrEF), []Combined (HFpEF and HFrEF), [] RHF, [] Pulm HTN, [] Other    [] KANWAL, [] ATN, [] AIN, [] other  [] CKD1, [] CKD2, [] CKD 3, [] CKD 4, [] CKD 5, []ESRD    Encephalopathy: [] Metabolic, [] Hepatic, [] toxic, [] Neurological, [] Other    Abnormal Nurtitional Status: [] malnurtition (see nutrition note), [ ]underweight: BMI < 19, [] morbid obesity: BMI >40, [] Cachexia    [] Sepsis  [] hypovolemic shock,[] cardiogenic shock, [] hemorrhagic shock, [] neuogenic shock  [] Acute Respiratory Failure  []Cerebral edema, [] Brain compression/ herniation,   [] Functional quadriplegia  [] Acute blood loss anemia   Surgery: suboccipital craniectomy and C1 laminectomy for chiari decompression   Consent: Signed by patient and put in chart    No Known Allergies      OVERNIGHT EVENTS: JOSAFAT overnight.     T(C): 36.9 (12-13-23 @ 15:50), Max: 36.9 (12-12-23 @ 17:05)  HR: 72 (12-13-23 @ 15:50) (64 - 79)  BP: 137/83 (12-13-23 @ 15:50) (107/64 - 137/83)  RR: 17 (12-13-23 @ 15:50) (16 - 18)  SpO2: 99% (12-13-23 @ 15:50) (97% - 99%)  Wt(kg): --    EXAM:  General: NAD, pt is comfortably sitting up in bed, on room air  HEENT: CN II-XII intact, PERRL 3mm briskly reactive, EOMI b/l, face symmetric, tongue midline, neck FROM  Cardiovascular: RRR  Respiratory: non-labored breathing on RA; chest rise symmetric  GI: abd soft, NTND   Neuro: A&Ox3, No aphasia, speech clear, no dysmetria, no pronator drift. Follows commands.  SAM x4 spontaneously, 5/5 strength in all extremities throughout. Sensation intact throughout.   Extremities: distal pulses 2+ x4      12-13    141  |  107  |  15  ----------------------------<  95  4.0   |  23  |  0.53    Ca    9.0      13 Dec 2023 06:31  Phos  3.4     12-13  Mg     2.1     12-13      CBC Full  -  ( 13 Dec 2023 06:31 )  WBC Count : 4.60 K/uL  RBC Count : 4.25 M/uL  Hemoglobin : 12.4 g/dL  Hematocrit : 37.6 %  Platelet Count - Automated : 229 K/uL  Mean Cell Volume : 88.5 fl  Mean Cell Hemoglobin : 29.2 pg  Mean Cell Hemoglobin Concentration : 33.0 gm/dL  Auto Neutrophil # : x  Auto Lymphocyte # : x  Auto Monocyte # : x  Auto Eosinophil # : x  Auto Basophil # : x  Auto Neutrophil % : x  Auto Lymphocyte % : x  Auto Monocyte % : x  Auto Eosinophil % : x  Auto Basophil % : x    PT/INR - ( 13 Dec 2023 10:44 )   PT: 11.8 sec;   INR: 1.04          PTT - ( 13 Dec 2023 10:44 )  PTT:34.2 sec    Pregnancy test: negative   Type & Screen (in past 72hrs): yes     CXR: complete, pending read  EKG:  pending  ECHO: N/A  Medical Clearances: complete per Dr. Berrios   Other Clearances: N/A    Last dose of antiplatelet/anticoagulation drug: N/A    Implanted Devices (pacemaker, drug pump...etc):  []YES   [x] NO                  If yes --> EPS consulted to interrogate/adjust device:    3M nasal swab ordered?  x_Y  _N  Cranial surgery: Order written for hair to be shampooed night before surgery  [x] yes   []no                 Assessment: 41 y/o F with PMH chiari malformation presents to ED c/o persistent worsened headache and neck pain x 2 days. Plan for suboccipital craniectomy and C1 laminectomy for chiari decompression w Dr. Price (12/14)    Plan:  Neuro:  - neuro/vital checks q4  - pain control prn  - MRI brain w/ CSF flow and MRI cervical spine 12/12: Chiari 1 malformation, no CSF flow obstruction  - CTH and CT cervical spine completed  - Chiari decompression scheduled 12/14    Cardiac:  - SBP goal 100-160  - pre-op EKG read pending     Pulmonary:  - on RA  - pre-op CXR complete, read pending     GI:  - NPO after midnight for OR  - bowel regimen  - pantoprazole for hx gastritis    Renal:  - IVF while NPO  - voiding    Heme:  - SCDs DVT ppx (holding AC preop)    Endo:  - no issues    ID:  - afebrile    Disposition: Regional status, full code, dispo pending OR 12/14    D/w Dr. Price    Assessment:  Present when checked    []  GCS  E   V  M     Heart Failure: []Acute, [] acute on chronic , []chronic  Heart Failure:  [] Diastolic (HFpEF), [] Systolic (HFrEF), []Combined (HFpEF and HFrEF), [] RHF, [] Pulm HTN, [] Other    [] KANWAL, [] ATN, [] AIN, [] other  [] CKD1, [] CKD2, [] CKD 3, [] CKD 4, [] CKD 5, []ESRD    Encephalopathy: [] Metabolic, [] Hepatic, [] toxic, [] Neurological, [] Other    Abnormal Nurtitional Status: [] malnurtition (see nutrition note), [ ]underweight: BMI < 19, [] morbid obesity: BMI >40, [] Cachexia    [] Sepsis  [] hypovolemic shock,[] cardiogenic shock, [] hemorrhagic shock, [] neuogenic shock  [] Acute Respiratory Failure  []Cerebral edema, [] Brain compression/ herniation,   [] Functional quadriplegia  [] Acute blood loss anemia

## 2023-12-13 NOTE — PATIENT PROFILE ADULT - FUNCTIONAL ASSESSMENT - BASIC MOBILITY 6.
4-calculated by average/Not able to assess (calculate score using [facility] averaging method)  4-calculated by average/Not able to assess (calculate score using [facility] averaging method)

## 2023-12-13 NOTE — PATIENT PROFILE ADULT - FUNCTIONAL ASSESSMENT - DAILY ACTIVITY 1.
Reason for call / Patient's question: Patient called to schedule consultation with Dr. Beard.    Call back number: N/A    Fax # (if applicable): N/A    Pharmacy (if applicable): N/A    Does patient require a call back from nurse: No  
4 = No assist / stand by assistance

## 2023-12-13 NOTE — PROGRESS NOTE ADULT - SUBJECTIVE AND OBJECTIVE BOX
O/N Events: JOSAFAT  Subjective/ROS: Less woozy than yesterday but still states she has some head fullness. Denies HA, CP, SOB, n/v, changes in bowel/urinary habits.  12pt ROS otherwise negative.    VITALS  Vital Signs Last 24 Hrs  T(C): 36.8 (13 Dec 2023 05:00), Max: 36.9 (12 Dec 2023 17:05)  T(F): 98.2 (13 Dec 2023 05:00), Max: 98.5 (12 Dec 2023 17:05)  HR: 69 (13 Dec 2023 05:00) (68 - 79)  BP: 107/64 (13 Dec 2023 05:00) (105/69 - 118/73)  BP(mean): --  RR: 16 (13 Dec 2023 05:00) (16 - 18)  SpO2: 98% (13 Dec 2023 05:00) (96% - 98%)    Parameters below as of 13 Dec 2023 05:00  Patient On (Oxygen Delivery Method): room air        I&O's Summary    12 Dec 2023 07:01  -  13 Dec 2023 07:00  --------------------------------------------------------  IN: 480 mL / OUT: 0 mL / NET: 480 mL        CAPILLARY BLOOD GLUCOSE          PHYSICAL EXAM  General: A&Ox3; NAD  Head: NC/AT; PERRL; EOMI; anicteric sclera  Neck: Supple; no JVD  Respiratory: CTA B/L; no wheezes/crackles/rales auscultated w/ good air movement  Cardiovascular: Regular rhythm/rate; S1/S2; no gallops or murmurs auscultated  Gastrointestinal: Soft; NTND w/out rebound tenderness or guarding; bowel sounds normal  Extremities: WWP; no edema or cyanosis; radial/pedal pulses palpable  Neurological:  CNII-XII grossly intact; no obvious focal deficits  Skin: No rashes noted  Vasc: +2 DP/PT pulses b/l   Psych: Appropriate Affect    MEDICATIONS  (STANDING):  chlorhexidine 2% Cloths 1 Application(s) Topical every 12 hours  influenza   Vaccine 0.5 milliLiter(s) IntraMuscular once  pantoprazole    Tablet 40 milliGRAM(s) Oral before breakfast  senna 2 Tablet(s) Oral at bedtime    MEDICATIONS  (PRN):  acetaminophen     Tablet .. 1000 milliGRAM(s) Oral every 8 hours PRN Temp greater or equal to 38.5C (101.3F), Mild Pain (1 - 3)  aluminum hydroxide/magnesium hydroxide/simethicone Suspension 30 milliLiter(s) Oral every 6 hours PRN Dyspepsia  methocarbamol 500 milliGRAM(s) Oral three times a day PRN Muscle Spasm  ondansetron Injectable 4 milliGRAM(s) IV Push every 6 hours PRN Nausea and/or Vomiting  traMADol 25 milliGRAM(s) Oral every 6 hours PRN Moderate Pain (4 - 6)  traMADol 50 milliGRAM(s) Oral every 6 hours PRN Severe Pain (7 - 10)      No Known Allergies      LABS                        12.4   4.60  )-----------( 229      ( 13 Dec 2023 06:31 )             37.6     12-13    141  |  107  |  15  ----------------------------<  95  4.0   |  23  |  0.53    Ca    9.0      13 Dec 2023 06:31  Phos  3.4     12-13  Mg     2.1     12-13      PT/INR - ( 11 Dec 2023 18:34 )   PT: 12.2 sec;   INR: 1.07          PTT - ( 11 Dec 2023 18:34 )  PTT:34.7 sec  Urinalysis Basic - ( 13 Dec 2023 06:31 )    Color: x / Appearance: x / SG: x / pH: x  Gluc: 95 mg/dL / Ketone: x  / Bili: x / Urobili: x   Blood: x / Protein: x / Nitrite: x   Leuk Esterase: x / RBC: x / WBC x   Sq Epi: x / Non Sq Epi: x / Bacteria: x            IMAGING/EKG/ETC: reviewed

## 2023-12-13 NOTE — PATIENT PROFILE ADULT - MONEY FOR FOOD
Patient currently admitted at [facility] in Labor.   
SURGICAL SCHEDULING REQUEST    Dr: Young Kohler MD     Patient Name: Miranda Hobson    History #: [id number]    : 2000    Telephone:  [phone]    Procedure: induction of labor  Diagnosis: elective IOL, post ALINA    Location: [facility]  Type: Routine  Special Instructions: pt may call to schedule IOL    Alina is 2023    Plan for 40.1-40.5wga IOL, cytotec PM desired  Pt understands and accepts risks      
no

## 2023-12-14 ENCOUNTER — APPOINTMENT (OUTPATIENT)
Dept: MRI IMAGING | Facility: HOSPITAL | Age: 43
End: 2023-12-14

## 2023-12-14 ENCOUNTER — APPOINTMENT (OUTPATIENT)
Dept: NEUROSURGERY | Facility: HOSPITAL | Age: 43
End: 2023-12-14

## 2023-12-14 LAB
ANION GAP SERPL CALC-SCNC: 11 MMOL/L — SIGNIFICANT CHANGE UP (ref 5–17)
APTT BLD: 109.4 SEC — HIGH (ref 24.5–35.6)
APTT BLD: 109.4 SEC — HIGH (ref 24.5–35.6)
BASE EXCESS BLDA CALC-SCNC: -1.4 MMOL/L — SIGNIFICANT CHANGE UP (ref -2–3)
BASE EXCESS BLDA CALC-SCNC: -1.4 MMOL/L — SIGNIFICANT CHANGE UP (ref -2–3)
BUN SERPL-MCNC: 10 MG/DL — SIGNIFICANT CHANGE UP (ref 7–23)
BUN SERPL-MCNC: 10 MG/DL — SIGNIFICANT CHANGE UP (ref 7–23)
BUN SERPL-MCNC: 14 MG/DL — SIGNIFICANT CHANGE UP (ref 7–23)
BUN SERPL-MCNC: 14 MG/DL — SIGNIFICANT CHANGE UP (ref 7–23)
CA-I BLDA-SCNC: 1.2 MMOL/L — SIGNIFICANT CHANGE UP (ref 1.15–1.33)
CA-I BLDA-SCNC: 1.2 MMOL/L — SIGNIFICANT CHANGE UP (ref 1.15–1.33)
CALCIUM SERPL-MCNC: 9.1 MG/DL — SIGNIFICANT CHANGE UP (ref 8.4–10.5)
CALCIUM SERPL-MCNC: 9.1 MG/DL — SIGNIFICANT CHANGE UP (ref 8.4–10.5)
CALCIUM SERPL-MCNC: 9.2 MG/DL — SIGNIFICANT CHANGE UP (ref 8.4–10.5)
CALCIUM SERPL-MCNC: 9.2 MG/DL — SIGNIFICANT CHANGE UP (ref 8.4–10.5)
CHLORIDE SERPL-SCNC: 104 MMOL/L — SIGNIFICANT CHANGE UP (ref 96–108)
CHLORIDE SERPL-SCNC: 104 MMOL/L — SIGNIFICANT CHANGE UP (ref 96–108)
CHLORIDE SERPL-SCNC: 106 MMOL/L — SIGNIFICANT CHANGE UP (ref 96–108)
CHLORIDE SERPL-SCNC: 106 MMOL/L — SIGNIFICANT CHANGE UP (ref 96–108)
CO2 BLDA-SCNC: 24 MMOL/L — SIGNIFICANT CHANGE UP (ref 19–24)
CO2 BLDA-SCNC: 24 MMOL/L — SIGNIFICANT CHANGE UP (ref 19–24)
CO2 SERPL-SCNC: 23 MMOL/L — SIGNIFICANT CHANGE UP (ref 22–31)
CO2 SERPL-SCNC: 23 MMOL/L — SIGNIFICANT CHANGE UP (ref 22–31)
CO2 SERPL-SCNC: 24 MMOL/L — SIGNIFICANT CHANGE UP (ref 22–31)
CO2 SERPL-SCNC: 24 MMOL/L — SIGNIFICANT CHANGE UP (ref 22–31)
COHGB MFR BLDA: 1.3 % — SIGNIFICANT CHANGE UP
COHGB MFR BLDA: 1.3 % — SIGNIFICANT CHANGE UP
CREAT SERPL-MCNC: 0.51 MG/DL — SIGNIFICANT CHANGE UP (ref 0.5–1.3)
CREAT SERPL-MCNC: 0.51 MG/DL — SIGNIFICANT CHANGE UP (ref 0.5–1.3)
CREAT SERPL-MCNC: 0.53 MG/DL — SIGNIFICANT CHANGE UP (ref 0.5–1.3)
CREAT SERPL-MCNC: 0.53 MG/DL — SIGNIFICANT CHANGE UP (ref 0.5–1.3)
EGFR: 120 ML/MIN/1.73M2 — SIGNIFICANT CHANGE UP
EGFR: 120 ML/MIN/1.73M2 — SIGNIFICANT CHANGE UP
EGFR: 121 ML/MIN/1.73M2 — SIGNIFICANT CHANGE UP
EGFR: 121 ML/MIN/1.73M2 — SIGNIFICANT CHANGE UP
GLUCOSE BLDA-MCNC: 117 MG/DL — HIGH (ref 70–99)
GLUCOSE BLDA-MCNC: 117 MG/DL — HIGH (ref 70–99)
GLUCOSE SERPL-MCNC: 166 MG/DL — HIGH (ref 70–99)
GLUCOSE SERPL-MCNC: 166 MG/DL — HIGH (ref 70–99)
GLUCOSE SERPL-MCNC: 98 MG/DL — SIGNIFICANT CHANGE UP (ref 70–99)
GLUCOSE SERPL-MCNC: 98 MG/DL — SIGNIFICANT CHANGE UP (ref 70–99)
HCO3 BLDA-SCNC: 23 MMOL/L — SIGNIFICANT CHANGE UP (ref 21–28)
HCO3 BLDA-SCNC: 23 MMOL/L — SIGNIFICANT CHANGE UP (ref 21–28)
HCT VFR BLD CALC: 37.5 % — SIGNIFICANT CHANGE UP (ref 34.5–45)
HCT VFR BLD CALC: 37.5 % — SIGNIFICANT CHANGE UP (ref 34.5–45)
HCT VFR BLD CALC: 37.9 % — SIGNIFICANT CHANGE UP (ref 34.5–45)
HCT VFR BLD CALC: 37.9 % — SIGNIFICANT CHANGE UP (ref 34.5–45)
HGB BLD-MCNC: 12.7 G/DL — SIGNIFICANT CHANGE UP (ref 11.5–15.5)
HGB BLD-MCNC: 12.7 G/DL — SIGNIFICANT CHANGE UP (ref 11.5–15.5)
HGB BLD-MCNC: 13 G/DL — SIGNIFICANT CHANGE UP (ref 11.5–15.5)
HGB BLD-MCNC: 13 G/DL — SIGNIFICANT CHANGE UP (ref 11.5–15.5)
HGB BLDA-MCNC: 11.1 G/DL — LOW (ref 11.7–16.1)
HGB BLDA-MCNC: 11.1 G/DL — LOW (ref 11.7–16.1)
INR BLD: 1.07 — SIGNIFICANT CHANGE UP (ref 0.85–1.18)
INR BLD: 1.07 — SIGNIFICANT CHANGE UP (ref 0.85–1.18)
MAGNESIUM SERPL-MCNC: 2 MG/DL — SIGNIFICANT CHANGE UP (ref 1.6–2.6)
MCHC RBC-ENTMCNC: 29.3 PG — SIGNIFICANT CHANGE UP (ref 27–34)
MCHC RBC-ENTMCNC: 29.3 PG — SIGNIFICANT CHANGE UP (ref 27–34)
MCHC RBC-ENTMCNC: 30 PG — SIGNIFICANT CHANGE UP (ref 27–34)
MCHC RBC-ENTMCNC: 30 PG — SIGNIFICANT CHANGE UP (ref 27–34)
MCHC RBC-ENTMCNC: 33.9 GM/DL — SIGNIFICANT CHANGE UP (ref 32–36)
MCHC RBC-ENTMCNC: 33.9 GM/DL — SIGNIFICANT CHANGE UP (ref 32–36)
MCHC RBC-ENTMCNC: 34.3 GM/DL — SIGNIFICANT CHANGE UP (ref 32–36)
MCHC RBC-ENTMCNC: 34.3 GM/DL — SIGNIFICANT CHANGE UP (ref 32–36)
MCV RBC AUTO: 85.6 FL — SIGNIFICANT CHANGE UP (ref 80–100)
MCV RBC AUTO: 85.6 FL — SIGNIFICANT CHANGE UP (ref 80–100)
MCV RBC AUTO: 88.4 FL — SIGNIFICANT CHANGE UP (ref 80–100)
MCV RBC AUTO: 88.4 FL — SIGNIFICANT CHANGE UP (ref 80–100)
METHGB MFR BLDA: 0.4 % — SIGNIFICANT CHANGE UP
METHGB MFR BLDA: 0.4 % — SIGNIFICANT CHANGE UP
NRBC # BLD: 0 /100 WBCS — SIGNIFICANT CHANGE UP (ref 0–0)
OXYHGB MFR BLDA: 97.7 % — HIGH (ref 90–95)
OXYHGB MFR BLDA: 97.7 % — HIGH (ref 90–95)
PCO2 BLDA: 35 MMHG — SIGNIFICANT CHANGE UP (ref 32–45)
PCO2 BLDA: 35 MMHG — SIGNIFICANT CHANGE UP (ref 32–45)
PH BLDA: 7.42 — SIGNIFICANT CHANGE UP (ref 7.35–7.45)
PH BLDA: 7.42 — SIGNIFICANT CHANGE UP (ref 7.35–7.45)
PHOSPHATE SERPL-MCNC: 2.9 MG/DL — SIGNIFICANT CHANGE UP (ref 2.5–4.5)
PHOSPHATE SERPL-MCNC: 2.9 MG/DL — SIGNIFICANT CHANGE UP (ref 2.5–4.5)
PHOSPHATE SERPL-MCNC: 3.7 MG/DL — SIGNIFICANT CHANGE UP (ref 2.5–4.5)
PHOSPHATE SERPL-MCNC: 3.7 MG/DL — SIGNIFICANT CHANGE UP (ref 2.5–4.5)
PLATELET # BLD AUTO: 251 K/UL — SIGNIFICANT CHANGE UP (ref 150–400)
PLATELET # BLD AUTO: 251 K/UL — SIGNIFICANT CHANGE UP (ref 150–400)
PLATELET # BLD AUTO: 260 K/UL — SIGNIFICANT CHANGE UP (ref 150–400)
PLATELET # BLD AUTO: 260 K/UL — SIGNIFICANT CHANGE UP (ref 150–400)
PO2 BLDA: 231 MMHG — HIGH (ref 83–108)
PO2 BLDA: 231 MMHG — HIGH (ref 83–108)
POTASSIUM BLDA-SCNC: 3.8 MMOL/L — SIGNIFICANT CHANGE UP (ref 3.5–5.1)
POTASSIUM BLDA-SCNC: 3.8 MMOL/L — SIGNIFICANT CHANGE UP (ref 3.5–5.1)
POTASSIUM SERPL-MCNC: 3.9 MMOL/L — SIGNIFICANT CHANGE UP (ref 3.5–5.3)
POTASSIUM SERPL-MCNC: 3.9 MMOL/L — SIGNIFICANT CHANGE UP (ref 3.5–5.3)
POTASSIUM SERPL-MCNC: 4.1 MMOL/L — SIGNIFICANT CHANGE UP (ref 3.5–5.3)
POTASSIUM SERPL-MCNC: 4.1 MMOL/L — SIGNIFICANT CHANGE UP (ref 3.5–5.3)
POTASSIUM SERPL-SCNC: 3.9 MMOL/L — SIGNIFICANT CHANGE UP (ref 3.5–5.3)
POTASSIUM SERPL-SCNC: 3.9 MMOL/L — SIGNIFICANT CHANGE UP (ref 3.5–5.3)
POTASSIUM SERPL-SCNC: 4.1 MMOL/L — SIGNIFICANT CHANGE UP (ref 3.5–5.3)
POTASSIUM SERPL-SCNC: 4.1 MMOL/L — SIGNIFICANT CHANGE UP (ref 3.5–5.3)
PROTHROM AB SERPL-ACNC: 12.2 SEC — SIGNIFICANT CHANGE UP (ref 9.5–13)
PROTHROM AB SERPL-ACNC: 12.2 SEC — SIGNIFICANT CHANGE UP (ref 9.5–13)
RBC # BLD: 4.24 M/UL — SIGNIFICANT CHANGE UP (ref 3.8–5.2)
RBC # BLD: 4.24 M/UL — SIGNIFICANT CHANGE UP (ref 3.8–5.2)
RBC # BLD: 4.43 M/UL — SIGNIFICANT CHANGE UP (ref 3.8–5.2)
RBC # BLD: 4.43 M/UL — SIGNIFICANT CHANGE UP (ref 3.8–5.2)
RBC # FLD: 12.7 % — SIGNIFICANT CHANGE UP (ref 10.3–14.5)
RBC # FLD: 12.7 % — SIGNIFICANT CHANGE UP (ref 10.3–14.5)
RBC # FLD: 12.9 % — SIGNIFICANT CHANGE UP (ref 10.3–14.5)
RBC # FLD: 12.9 % — SIGNIFICANT CHANGE UP (ref 10.3–14.5)
SAO2 % BLDA: 99.5 % — HIGH (ref 94–98)
SAO2 % BLDA: 99.5 % — HIGH (ref 94–98)
SODIUM BLDA-SCNC: 132 MMOL/L — LOW (ref 136–145)
SODIUM BLDA-SCNC: 132 MMOL/L — LOW (ref 136–145)
SODIUM SERPL-SCNC: 138 MMOL/L — SIGNIFICANT CHANGE UP (ref 135–145)
SODIUM SERPL-SCNC: 138 MMOL/L — SIGNIFICANT CHANGE UP (ref 135–145)
SODIUM SERPL-SCNC: 141 MMOL/L — SIGNIFICANT CHANGE UP (ref 135–145)
SODIUM SERPL-SCNC: 141 MMOL/L — SIGNIFICANT CHANGE UP (ref 135–145)
WBC # BLD: 4.93 K/UL — SIGNIFICANT CHANGE UP (ref 3.8–10.5)
WBC # BLD: 4.93 K/UL — SIGNIFICANT CHANGE UP (ref 3.8–10.5)
WBC # BLD: 6.01 K/UL — SIGNIFICANT CHANGE UP (ref 3.8–10.5)
WBC # BLD: 6.01 K/UL — SIGNIFICANT CHANGE UP (ref 3.8–10.5)
WBC # FLD AUTO: 4.93 K/UL — SIGNIFICANT CHANGE UP (ref 3.8–10.5)
WBC # FLD AUTO: 4.93 K/UL — SIGNIFICANT CHANGE UP (ref 3.8–10.5)
WBC # FLD AUTO: 6.01 K/UL — SIGNIFICANT CHANGE UP (ref 3.8–10.5)
WBC # FLD AUTO: 6.01 K/UL — SIGNIFICANT CHANGE UP (ref 3.8–10.5)

## 2023-12-14 PROCEDURE — 69990 MICROSURGERY ADD-ON: CPT

## 2023-12-14 PROCEDURE — 61343 CRNEC SOPL CRV LAM DCMPRN: CPT

## 2023-12-14 PROCEDURE — 99291 CRITICAL CARE FIRST HOUR: CPT

## 2023-12-14 PROCEDURE — 99233 SBSQ HOSP IP/OBS HIGH 50: CPT

## 2023-12-14 DEVICE — SURGIFOAM PAD 8CM X 12.5CM X 10MM (100): Type: IMPLANTABLE DEVICE | Status: FUNCTIONAL

## 2023-12-14 DEVICE — SURGIFLO HEMOSTATIC MATRIX KIT: Type: IMPLANTABLE DEVICE | Status: FUNCTIONAL

## 2023-12-14 DEVICE — SURGCEL 4 X 8": Type: IMPLANTABLE DEVICE | Status: FUNCTIONAL

## 2023-12-14 DEVICE — MAYFIELD SKULL PIN ADULT PLASTIC: Type: IMPLANTABLE DEVICE | Status: FUNCTIONAL

## 2023-12-14 DEVICE — DURASEAL: Type: IMPLANTABLE DEVICE | Status: FUNCTIONAL

## 2023-12-14 RX ORDER — DEXAMETHASONE 0.5 MG/5ML
2 ELIXIR ORAL DAILY
Refills: 0 | Status: CANCELLED | OUTPATIENT
Start: 2023-12-21 | End: 2023-12-17

## 2023-12-14 RX ORDER — DEXAMETHASONE 0.5 MG/5ML
4 ELIXIR ORAL EVERY 8 HOURS
Refills: 0 | Status: DISCONTINUED | OUTPATIENT
Start: 2023-12-18 | End: 2023-12-17

## 2023-12-14 RX ORDER — HYDROMORPHONE HYDROCHLORIDE 2 MG/ML
0.5 INJECTION INTRAMUSCULAR; INTRAVENOUS; SUBCUTANEOUS ONCE
Refills: 0 | Status: DISCONTINUED | OUTPATIENT
Start: 2023-12-14 | End: 2023-12-14

## 2023-12-14 RX ORDER — ACETAMINOPHEN 500 MG
1000 TABLET ORAL EVERY 8 HOURS
Refills: 0 | Status: DISCONTINUED | OUTPATIENT
Start: 2023-12-14 | End: 2023-12-17

## 2023-12-14 RX ORDER — TRAMADOL HYDROCHLORIDE 50 MG/1
50 TABLET ORAL EVERY 6 HOURS
Refills: 0 | Status: DISCONTINUED | OUTPATIENT
Start: 2023-12-14 | End: 2023-12-17

## 2023-12-14 RX ORDER — SENNA PLUS 8.6 MG/1
2 TABLET ORAL AT BEDTIME
Refills: 0 | Status: DISCONTINUED | OUTPATIENT
Start: 2023-12-14 | End: 2023-12-17

## 2023-12-14 RX ORDER — ONDANSETRON 8 MG/1
4 TABLET, FILM COATED ORAL EVERY 6 HOURS
Refills: 0 | Status: DISCONTINUED | OUTPATIENT
Start: 2023-12-14 | End: 2023-12-17

## 2023-12-14 RX ORDER — DEXAMETHASONE 0.5 MG/5ML
6 ELIXIR ORAL EVERY 6 HOURS
Refills: 0 | Status: COMPLETED | OUTPATIENT
Start: 2023-12-14 | End: 2023-12-15

## 2023-12-14 RX ORDER — PANTOPRAZOLE SODIUM 20 MG/1
40 TABLET, DELAYED RELEASE ORAL
Refills: 0 | Status: DISCONTINUED | OUTPATIENT
Start: 2023-12-14 | End: 2023-12-17

## 2023-12-14 RX ORDER — DIAZEPAM 5 MG
5 TABLET ORAL EVERY 8 HOURS
Refills: 0 | Status: DISCONTINUED | OUTPATIENT
Start: 2023-12-14 | End: 2023-12-16

## 2023-12-14 RX ORDER — DEXAMETHASONE 0.5 MG/5ML
2 ELIXIR ORAL EVERY 12 HOURS
Refills: 0 | Status: CANCELLED | OUTPATIENT
Start: 2023-12-20 | End: 2023-12-17

## 2023-12-14 RX ORDER — DEXAMETHASONE 0.5 MG/5ML
4 ELIXIR ORAL EVERY 6 HOURS
Refills: 0 | Status: DISCONTINUED | OUTPATIENT
Start: 2023-12-16 | End: 2023-12-17

## 2023-12-14 RX ORDER — DEXAMETHASONE 0.5 MG/5ML
2 ELIXIR ORAL EVERY 8 HOURS
Refills: 0 | Status: CANCELLED | OUTPATIENT
Start: 2023-12-19 | End: 2023-12-17

## 2023-12-14 RX ORDER — CEFAZOLIN SODIUM 1 G
2000 VIAL (EA) INJECTION EVERY 8 HOURS
Refills: 0 | Status: COMPLETED | OUTPATIENT
Start: 2023-12-14 | End: 2023-12-15

## 2023-12-14 RX ORDER — ACETAMINOPHEN 500 MG
1000 TABLET ORAL EVERY 8 HOURS
Refills: 0 | Status: DISCONTINUED | OUTPATIENT
Start: 2023-12-14 | End: 2023-12-14

## 2023-12-14 RX ORDER — CHLORHEXIDINE GLUCONATE 213 G/1000ML
1 SOLUTION TOPICAL
Refills: 0 | Status: DISCONTINUED | OUTPATIENT
Start: 2023-12-14 | End: 2023-12-15

## 2023-12-14 RX ORDER — DEXAMETHASONE 0.5 MG/5ML
ELIXIR ORAL
Refills: 0 | Status: DISCONTINUED | OUTPATIENT
Start: 2023-12-14 | End: 2023-12-17

## 2023-12-14 RX ORDER — TRAMADOL HYDROCHLORIDE 50 MG/1
25 TABLET ORAL EVERY 6 HOURS
Refills: 0 | Status: DISCONTINUED | OUTPATIENT
Start: 2023-12-14 | End: 2023-12-17

## 2023-12-14 RX ORDER — SODIUM CHLORIDE 9 MG/ML
1000 INJECTION INTRAMUSCULAR; INTRAVENOUS; SUBCUTANEOUS
Refills: 0 | Status: DISCONTINUED | OUTPATIENT
Start: 2023-12-14 | End: 2023-12-15

## 2023-12-14 RX ADMIN — Medication 6 MILLIGRAM(S): at 23:27

## 2023-12-14 RX ADMIN — CELECOXIB 200 MILLIGRAM(S): 200 CAPSULE ORAL at 06:03

## 2023-12-14 RX ADMIN — Medication 100 MILLIGRAM(S): at 23:00

## 2023-12-14 RX ADMIN — Medication 1000 MILLIGRAM(S): at 06:30

## 2023-12-14 RX ADMIN — TRAMADOL HYDROCHLORIDE 50 MILLIGRAM(S): 50 TABLET ORAL at 21:00

## 2023-12-14 RX ADMIN — CHLORHEXIDINE GLUCONATE 1 APPLICATION(S): 213 SOLUTION TOPICAL at 06:03

## 2023-12-14 RX ADMIN — Medication 1000 MILLIGRAM(S): at 06:03

## 2023-12-14 RX ADMIN — TRAMADOL HYDROCHLORIDE 50 MILLIGRAM(S): 50 TABLET ORAL at 22:00

## 2023-12-14 RX ADMIN — Medication 1 APPLICATION(S): at 06:26

## 2023-12-14 RX ADMIN — TRAMADOL HYDROCHLORIDE 25 MILLIGRAM(S): 50 TABLET ORAL at 22:08

## 2023-12-14 RX ADMIN — APREPITANT 40 MILLIGRAM(S): 80 CAPSULE ORAL at 06:03

## 2023-12-14 RX ADMIN — PANTOPRAZOLE SODIUM 40 MILLIGRAM(S): 20 TABLET, DELAYED RELEASE ORAL at 06:02

## 2023-12-14 RX ADMIN — HYDROMORPHONE HYDROCHLORIDE 0.5 MILLIGRAM(S): 2 INJECTION INTRAMUSCULAR; INTRAVENOUS; SUBCUTANEOUS at 20:15

## 2023-12-14 RX ADMIN — TRAMADOL HYDROCHLORIDE 25 MILLIGRAM(S): 50 TABLET ORAL at 23:00

## 2023-12-14 RX ADMIN — HYDROMORPHONE HYDROCHLORIDE 0.5 MILLIGRAM(S): 2 INJECTION INTRAMUSCULAR; INTRAVENOUS; SUBCUTANEOUS at 20:30

## 2023-12-14 RX ADMIN — CELECOXIB 200 MILLIGRAM(S): 200 CAPSULE ORAL at 06:30

## 2023-12-14 NOTE — PROGRESS NOTE ADULT - SUBJECTIVE AND OBJECTIVE BOX
O/N Events: JOSAFAT  Subjective/ROS: Anxious regarding when her procedure will be. Denies HA, CP, SOB, n/v, changes in bowel/urinary habits.  12pt ROS otherwise negative.    VITALS  Vital Signs Last 24 Hrs  T(C): 36.4 (14 Dec 2023 13:41), Max: 36.9 (13 Dec 2023 15:50)  T(F): 97.5 (14 Dec 2023 09:13), Max: 98.4 (13 Dec 2023 15:50)  HR: 68 (14 Dec 2023 13:41) (68 - 75)  BP: 109/75 (14 Dec 2023 13:41) (108/77 - 137/83)  BP(mean): --  RR: 16 (14 Dec 2023 13:41) (15 - 18)  SpO2: 99% (14 Dec 2023 13:41) (97% - 99%)    Parameters below as of 14 Dec 2023 09:13  Patient On (Oxygen Delivery Method): room air        I&O's Summary      CAPILLARY BLOOD GLUCOSE          PHYSICAL EXAM  General: A&Ox3; NAD  Head: NC/AT; PERRL; EOMI; anicteric sclera  Neck: Supple; no JVD  Respiratory: CTA B/L; no wheezes/crackles/rales auscultated w/ good air movement  Cardiovascular: Regular rhythm/rate; S1/S2; no gallops or murmurs auscultated  Gastrointestinal: Soft; NTND w/out rebound tenderness or guarding; bowel sounds normal  Extremities: WWP; no edema or cyanosis; radial/pedal pulses palpable  Neurological:  CNII-XII grossly intact; no obvious focal deficits  Skin: No rashes noted  Vasc: +2 DP/PT pulses b/l   Psych: Appropriate Affect    MEDICATIONS  (STANDING):  influenza   Vaccine 0.5 milliLiter(s) IntraMuscular once    MEDICATIONS  (PRN):      No Known Allergies      LABS                        12.7   4.93  )-----------( 260      ( 14 Dec 2023 05:22 )             37.5     12-14    141  |  106  |  14  ----------------------------<  98  4.1   |  24  |  0.53    Ca    9.1      14 Dec 2023 05:22  Phos  3.7     12-14  Mg     2.0     12-14      PT/INR - ( 13 Dec 2023 10:44 )   PT: 11.8 sec;   INR: 1.04          PTT - ( 13 Dec 2023 10:44 )  PTT:34.2 sec  Urinalysis Basic - ( 14 Dec 2023 05:22 )    Color: x / Appearance: x / SG: x / pH: x  Gluc: 98 mg/dL / Ketone: x  / Bili: x / Urobili: x   Blood: x / Protein: x / Nitrite: x   Leuk Esterase: x / RBC: x / WBC x   Sq Epi: x / Non Sq Epi: x / Bacteria: x            IMAGING/EKG/ETC: reviewed

## 2023-12-14 NOTE — DIETITIAN INITIAL EVALUATION ADULT - PERTINENT MEDS FT
MEDICATIONS  (STANDING):  influenza   Vaccine 0.5 milliLiter(s) IntraMuscular once  pantoprazole    Tablet 40 milliGRAM(s) Oral before breakfast  senna 2 Tablet(s) Oral at bedtime  sodium chloride 0.9%. 1000 milliLiter(s) (95 mL/Hr) IV Continuous <Continuous>    MEDICATIONS  (PRN):  acetaminophen     Tablet .. 1000 milliGRAM(s) Oral every 8 hours PRN Temp greater or equal to 38.5C (101.3F), Mild Pain (1 - 3)  aluminum hydroxide/magnesium hydroxide/simethicone Suspension 30 milliLiter(s) Oral every 6 hours PRN Dyspepsia  methocarbamol 500 milliGRAM(s) Oral three times a day PRN Muscle Spasm  ondansetron Injectable 4 milliGRAM(s) IV Push every 6 hours PRN Nausea and/or Vomiting  traMADol 25 milliGRAM(s) Oral every 6 hours PRN Moderate Pain (4 - 6)  traMADol 50 milliGRAM(s) Oral every 6 hours PRN Severe Pain (7 - 10)

## 2023-12-14 NOTE — PROGRESS NOTE ADULT - SUBJECTIVE AND OBJECTIVE BOX
***********************************************  ADULT [facility] PROGRESS NOTE  EBONY COLON [id number] [facility] [id number]  ***********************************************    Brief HPI: 42 F with history of chiari malformation with c/o worsening headache and neck pain of two days duration [facility] SOc crani and C1 laminectomy w/ laminoplasty .      ROS: negative except per mentioned above in 24h interval events.      VITALS:    ICU Vital Signs Last 24 Hrs  T(C): 36.4 (14 Dec 2023 22:04), Max: 36.8 (14 Dec 2023 03:58)  T(F): 97.6 (14 Dec 2023 22:04), Max: 98.2 (14 Dec 2023 03:58)  HR: 80 (14 Dec 2023 21:55) (68 - 85)  BP: 123/67 (14 Dec 2023 20:10) (108/77 - 123/67)  BP(mean): 89 (14 Dec 2023 20:10) (89 - 89)  ABP: 127/66 (14 Dec 2023 21:55) (126/68 - 146/83)  ABP(mean): 90 (14 Dec 2023 21:55) (90 - 107)  RR: 15 (14 Dec 2023 20:55) (15 - 18)  SpO2: 98% (14 Dec 2023 21:55) (98% - 100%)    O2 Parameters below as of 14 Dec 2023 21:55  Patient On (Oxygen Delivery Method): room air                I&O's Summary      EXAM:     Carroll Coma Scale: 3/5/6 = 14    General: normocephalic, atraumatic, laying in bed, mild painful distress  Neuro     MS: A/Ox3, cooperative, poor attention, no neglect, comprehension intact, speech with preserved fluency, naming, and repetition    CN: PERRL, VF FTC, EOMI and no ptosis bilaterally, sensation intact to crude touch V1-V3, face symmetric, hearing grossly intact    Mot: bulk normal, tone normal, power 5/5 in bilateral upper and lower proximal extremities    Sens: intact to crude touch in bilateral upper and lower extremities    Reflexes: deferred    Coord: no dysmetria or ataxia on finger to nose/heel to shin, respectively, no focal bradykinetic movements    Gait: deferred  Chest: nonlabored respirations, no adventitious lung sounds bilaterally, heart regular rate/rhythm, present S1/S2, no murmurs or rubs  Abdomen: nondistended, soft and nontender without peritoneal signs, normoactive bowel sounds  Extremities: no clubbing, well-perfused, no edema    LABORATORY DATA:    ABG - ( 14 Dec 2023 16:33 )  pH, Arterial: 7.42  pH, Blood: x     /  pCO2: 35    /  pO2: 231   / HCO3: 23    / Base Excess: -1.4  /  SaO2: x                                       13.0   6.01  )-----------( 251      ( 14 Dec 2023 20:47 )             37.9     12-14    138  |  104  |  10  ----------------------------<  166<H>  3.9   |  23  |  0.51    Ca    9.2      14 Dec 2023 20:47  Phos  2.9     12-14  Mg     2.0     12-14        PT/INR - ( 14 Dec 2023 20:47 )   PT: 12.2 sec;   INR: 1.07          PTT - ( 14 Dec 2023 20:47 )  PTT:109.4 sec    IMAGING DATA:    CARDIOLOGY DATA:    MICROBIOLOGY DATA:        MEDICATIONS  (STANDING):  ceFAZolin   IVPB 2000 milliGRAM(s) IV Intermittent every 8 hours  dexAMETHasone     Tablet 6 milliGRAM(s) Oral every 6 hours  dexAMETHasone     Tablet   Oral   diazepam    Tablet 5 milliGRAM(s) Oral every 8 hours  HYDROmorphone   Tablet 0.5 milliGRAM(s) Oral once  influenza   Vaccine 0.5 milliLiter(s) IntraMuscular once  pantoprazole    Tablet 40 milliGRAM(s) Oral before breakfast  senna 2 Tablet(s) Oral at bedtime  sodium chloride 0.9%. 1000 milliLiter(s) (95 mL/Hr) IV Continuous <Continuous>    MEDICATIONS  (PRN):  acetaminophen     Tablet .. 1000 milliGRAM(s) Oral every 8 hours PRN Temp greater or equal to 38.5C (101.3F), Mild Pain (1 - 3)  aluminum hydroxide/magnesium hydroxide/simethicone Suspension 30 milliLiter(s) Oral every 6 hours PRN Dyspepsia  ondansetron Injectable 4 milliGRAM(s) IV Push every 6 hours PRN Nausea and/or Vomiting  traMADol 50 milliGRAM(s) Oral every 6 hours PRN Severe Pain (7 - 10)  traMADol 25 milliGRAM(s) Oral every 6 hours PRN Moderate Pain (4 - 6)      ***********************************************  ASSESSMENT AND PLAN  ***********************************************    NEURO - admit NSICU, Q1h neuro checks / Q1h vital signs, valium 5q8 x2d, dex 6q6 taper, hard c-collar, pain control, PT/OT tomorrow  PULM - SpO2 goal > 92%, supplemental O2 and pulm toileting as needed  CARDIO - BP goal < 140  GI - bowel regimen, stool count, diet: ADAT following swallow evaluation, PPI while on steroids  /RENAL - monitor UOP/volume status, BUN/SCr  HEME - maintain Hb > 7.0, PLT > 100,000, SCDs, holding VTE chemoppx as patient is fresh postop  ID - monitor for infectious s/s, fever curve, leukocytosis, postop ancef  ENDO - SGlu goal < 200 ***********************************************  ADULT [facility] PROGRESS NOTE  EBONY COLON [id number] [facility] [id number] [id number]  ***********************************************    Brief HPI: 42 F with history of chiari malformation with c/o worsening headache and neck pain of two days duration [facility] and C1 laminectomy w/ laminoplasty .      ROS: negative except per mentioned above in 24h interval events.      VITALS:    ICU Vital Signs Last 24 Hrs  T(C): 36.4 (14 Dec 2023 22:04), Max: 36.8 (14 Dec 2023 03:58)  T(F): 97.6 (14 Dec 2023 22:04), Max: 98.2 (14 Dec 2023 03:58)  HR: 80 (14 Dec 2023 21:55) (68 - 85)  BP: 123/67 (14 Dec 2023 20:10) (108/77 - 123/67)  BP(mean): 89 (14 Dec 2023 20:10) (89 - 89)  ABP: 127/66 (14 Dec 2023 21:55) (126/68 - 146/83)  ABP(mean): 90 (14 Dec 2023 21:55) (90 - 107)  RR: 15 (14 Dec 2023 20:55) (15 - 18)  SpO2: 98% (14 Dec 2023 21:55) (98% - 100%)    O2 Parameters below as of 14 Dec 2023 21:55  Patient On (Oxygen Delivery Method): room air                I&O's Summary      EXAM:     Carroll Coma Scale: 3/5/6 = 14    General: normocephalic, atraumatic, laying in bed, mild painful distress  Neuro     MS: A/Ox3, cooperative, poor attention, no neglect, comprehension intact, speech with preserved fluency, naming, and repetition    CN: PERRL, VF FTC, EOMI and no ptosis bilaterally, sensation intact to crude touch V1-V3, face symmetric, hearing grossly intact    Mot: bulk normal, tone normal, power 5/5 in bilateral upper and lower proximal extremities    Sens: intact to crude touch in bilateral upper and lower extremities    Reflexes: deferred    Coord: no dysmetria or ataxia on finger to nose/heel to shin, respectively, no focal bradykinetic movements    Gait: deferred  Chest: nonlabored respirations, no adventitious lung sounds bilaterally, heart regular rate/rhythm, present S1/S2, no murmurs or rubs  Abdomen: nondistended, soft and nontender without peritoneal signs, normoactive bowel sounds  Extremities: no clubbing, well-perfused, no edema    LABORATORY DATA:    ABG - ( 14 Dec 2023 16:33 )  pH, Arterial: 7.42  pH, Blood: x     /  pCO2: 35    /  pO2: 231   / HCO3: 23    / Base Excess: -1.4  /  SaO2: x                                       13.0   6.01  )-----------( 251      ( 14 Dec 2023 20:47 )             37.9     12-14    138  |  104  |  10  ----------------------------<  166<H>  3.9   |  23  |  0.51    Ca    9.2      14 Dec 2023 20:47  Phos  2.9     12-14  Mg     2.0     12-14        PT/INR - ( 14 Dec 2023 20:47 )   PT: 12.2 sec;   INR: 1.07          PTT - ( 14 Dec 2023 20:47 )  PTT:109.4 sec    IMAGING DATA:    CARDIOLOGY DATA:    MICROBIOLOGY DATA:        MEDICATIONS  (STANDING):  ceFAZolin   IVPB 2000 milliGRAM(s) IV Intermittent every 8 hours  dexAMETHasone     Tablet 6 milliGRAM(s) Oral every 6 hours  dexAMETHasone     Tablet   Oral   diazepam    Tablet 5 milliGRAM(s) Oral every 8 hours  HYDROmorphone   Tablet 0.5 milliGRAM(s) Oral once  influenza   Vaccine 0.5 milliLiter(s) IntraMuscular once  pantoprazole    Tablet 40 milliGRAM(s) Oral before breakfast  senna 2 Tablet(s) Oral at bedtime  sodium chloride 0.9%. 1000 milliLiter(s) (95 mL/Hr) IV Continuous <Continuous>    MEDICATIONS  (PRN):  acetaminophen     Tablet .. 1000 milliGRAM(s) Oral every 8 hours PRN Temp greater or equal to 38.5C (101.3F), Mild Pain (1 - 3)  aluminum hydroxide/magnesium hydroxide/simethicone Suspension 30 milliLiter(s) Oral every 6 hours PRN Dyspepsia  ondansetron Injectable 4 milliGRAM(s) IV Push every 6 hours PRN Nausea and/or Vomiting  traMADol 50 milliGRAM(s) Oral every 6 hours PRN Severe Pain (7 - 10)  traMADol 25 milliGRAM(s) Oral every 6 hours PRN Moderate Pain (4 - 6)      ***********************************************  ASSESSMENT AND PLAN  ***********************************************    NEURO - admit NSICU, Q1h neuro checks / Q1h vital signs, valium 5q8 x2d, dex 6q6 taper, hard c-collar, pain control, PT/OT tomorrow  PULM - SpO2 goal > 92%, supplemental O2 and pulm toileting as needed  CARDIO - BP goal < 140  GI - bowel regimen, stool count, diet: ADAT following swallow evaluation, PPI while on steroids  /RENAL - monitor UOP/volume status, BUN/SCr  HEME - maintain Hb > 7.0, PLT > 100,000, SCDs, holding VTE chemoppx as patient is fresh postop  ID - monitor for infectious s/s, fever curve, leukocytosis, postop ancef  ENDO - SGlu goal < 200

## 2023-12-14 NOTE — PROGRESS NOTE ADULT - SUBJECTIVE AND OBJECTIVE BOX
HPI:  41 y/o F with pmh chiari malformation presents to ED c/o persistent worsened headache and neck pain x 2 days. Patient reports that she has experienced headaches intermittently for more than 10 years related to her chiari malformation. The pain typically is elicited with quick head movements or laughing hard but usually resolves. Patient reports that she also has some mild dizziness associated with her recent headache. She has taken tylenol and motrin over the counter for pain without relief. Patient denies nausea, vomiting, vision changes, extremity numbness or weakness, speech changes and confusion. (11 Dec 2023 18:40)    HOSPITAL COURSE:  12/11: adm, CTH/CT C spine complete  12/12: JOSAFAT overnight. MRI c-spine / MR brain w/ CSF flow pending, robaxin helping w/ neck stiffness, has medical clearance for chiari decompression Thursday 12/14. MRI read Chiari I malformation w tonsillar herniation.   12/13: Preop for 12/14. Consent obtained and signed in chart. Pending EKG and CXR final read.    OVERNIGHT EVENTS: Plan for OR today for chiari decompression.     Vital Signs Last 24 Hrs  T(C): 36.6 (13 Dec 2023 21:10), Max: 36.9 (13 Dec 2023 15:50)  T(F): 97.9 (13 Dec 2023 21:10), Max: 98.4 (13 Dec 2023 15:50)  HR: 75 (13 Dec 2023 21:10) (64 - 75)  BP: 109/73 (13 Dec 2023 21:10) (107/64 - 137/83)  BP(mean): --  RR: 18 (13 Dec 2023 21:10) (16 - 18)  SpO2: 97% (13 Dec 2023 21:10) (97% - 99%)    Parameters below as of 13 Dec 2023 21:10  Patient On (Oxygen Delivery Method): room air    I&O's Summary    12 Dec 2023 07:01  -  13 Dec 2023 07:00  --------------------------------------------------------  IN: 480 mL / OUT: 0 mL / NET: 480 mL    PHYSICAL EXAM:  General: Pt is sitting up comfortably in bed, in NAD, on RA  HEENT: CN II-XII grossly intact, PERRL 3mm, EOMI B/L, face symmetric  Cardiovascular: RRR, normal S1 and S2   Respiratory: non-labored breathing, symmetric chest rise   GI: abd soft, NTND   Neuro: A&O x 3, no aphasia, speech clear, no dysmetria, no pronator drift  Strength 5/5 throughout all 4 extremities  Sensation intact to light touch throughout   Vascular: Distal pulses 2+ x4, no calf edema or erythema    DIET:  [] NPO  [X] Mechanical, NPO at midnight   [] Tube feeds    LABS:                        12.4   4.60  )-----------( 229      ( 13 Dec 2023 06:31 )             37.6     12-13    141  |  107  |  15  ----------------------------<  95  4.0   |  23  |  0.53    Ca    9.0      13 Dec 2023 06:31  Phos  3.4     12-13  Mg     2.1     12-13      PT/INR - ( 13 Dec 2023 10:44 )   PT: 11.8 sec;   INR: 1.04          PTT - ( 13 Dec 2023 10:44 )  PTT:34.2 sec  Urinalysis Basic - ( 13 Dec 2023 06:31 )    Color: x / Appearance: x / SG: x / pH: x  Gluc: 95 mg/dL / Ketone: x  / Bili: x / Urobili: x   Blood: x / Protein: x / Nitrite: x   Leuk Esterase: x / RBC: x / WBC x   Sq Epi: x / Non Sq Epi: x / Bacteria: x    CAPILLARY BLOOD GLUCOSE    Allergies    No Known Allergies    Intolerances    MEDICATIONS:  Antibiotics:    Neuro:  acetaminophen     Tablet .. 1000 milliGRAM(s) Oral every 8 hours PRN  acetaminophen     Tablet .. 1000 milliGRAM(s) Oral once  aprepitant 40 milliGRAM(s) Oral once  celecoxib 200 milliGRAM(s) Oral once  methocarbamol 500 milliGRAM(s) Oral three times a day PRN  ondansetron Injectable 4 milliGRAM(s) IV Push every 6 hours PRN  traMADol 25 milliGRAM(s) Oral every 6 hours PRN  traMADol 50 milliGRAM(s) Oral every 6 hours PRN    Anticoagulation:    OTHER:  aluminum hydroxide/magnesium hydroxide/simethicone Suspension 30 milliLiter(s) Oral every 6 hours PRN  chlorhexidine 2% Cloths 1 Application(s) Topical every 12 hours  influenza   Vaccine 0.5 milliLiter(s) IntraMuscular once  pantoprazole    Tablet 40 milliGRAM(s) Oral before breakfast  povidone iodine 5% Nasal Swab 1 Application(s) Both Nostrils once  senna 2 Tablet(s) Oral at bedtime    IVF:  sodium chloride 0.9%. 1000 milliLiter(s) IV Continuous <Continuous>    ASSESSMENT:  41 y/o F with PMH chiari malformation presents to ED c/o persistent worsened headache and neck pain x 2 days. Plan for OR 12/14.     HEADACHE    No pertinent family history in first degree relatives    Handoff    MEWS Score    No pertinent past medical history    Arachnoid cyst    Headache    Arachnoid cyst    H/O breast surgery    NECK PAIN    90+    [email or web address]    PLAN:  Neuro:  - neuro/vital checks q4  - pain control prn  - MRI brain w/ CSF flow and MRI cervical spine 12/12: Chiari 1 malformation, no CSF flow obstruction  - CTH and CT cervical spine completed  - Chiari decompression scheduled 12/14    Cardiac:  - SBP goal 100-160  - pending EKG for pre-op    Pulmonary:  - on RA  - pre-op CXR pending read    GI:  - NPO after midnight for OR  - bowel regimen  - pantoprazole for hx gastritis    Renal:  - IVF while NPO  - voiding    Heme:  - SCDs DVT ppx (holding AC preop)    Endo:  - no issues    ID:  - afebrile    Disposition: Regional status, full code, dispo pending OR 12/14    D/w Dr. Price

## 2023-12-14 NOTE — PROGRESS NOTE ADULT - ASSESSMENT
41 y/o F with pmh chiari malformation presents to ED c/o persistent worsened headache and neck pain x 2 days. Admitted to [facility] for further workup.     Chiari Malformation  -Plan for further MRI   -OR planning  -Would stop codeine for headache control     Pre-Op Eval  RCRI 0, Class I, 3.9% CV Risk   METS>10  optimized for any planned procedure without further testing     43 y/o F with pmh chiari malformation presents to ED c/o persistent worsened headache and neck pain x 2 days. Admitted to [facility] for further workup.     Chiari Malformation  -Plan for further MRI   -OR planning  -Would stop codeine for headache control     Pre-Op Eval  RCRI 0, Class I, 3.9% CV Risk   METS>10  optimized for any planned procedure without further testing

## 2023-12-14 NOTE — DIETITIAN INITIAL EVALUATION ADULT - PERTINENT LABORATORY DATA
12-14    141  |  106  |  14  ----------------------------<  98  4.1   |  24  |  0.53    Ca    9.1      14 Dec 2023 05:22  Phos  3.7     12-14  Mg     2.0     12-14

## 2023-12-14 NOTE — DIETITIAN INITIAL EVALUATION ADULT - OTHER INFO
43 y/o F with PMH chiari malformation presents to ED c/o persistent worsened headache and neck pain x 2 days. Plan for OR 12/14.     Pt seen on 8WO for assessment. Labs and medication orders reviewed. Electrolytes WNL. NPO for OR today 12/14. Pt asleep at time of assessment, interview deferred to sister at bedside. Reports pt with good PO intake prior to NPO. Unable to provide UBW, denies pt with wt loss PTA. Reports pt with mild nausea without vomiting this AM 12/14 status post medication administration, reports otherwise no GI concerns, states pt's last BM yesterday 12/13. Denies pt with difficulty chewing/swallowing. Confirms NKFA. No Religion/ethnic/cultural food preferences noted. No pressure injuries or edema documented, Oliver score 21. See nutrition recommendations. RD to remain available.  41 y/o F with PMH chiari malformation presents to ED c/o persistent worsened headache and neck pain x 2 days. Plan for OR 12/14.     Pt seen on 8WO for assessment. Labs and medication orders reviewed. Electrolytes WNL. NPO for OR today 12/14. Pt asleep at time of assessment, interview deferred to sister at bedside. Reports pt with good PO intake prior to NPO. Unable to provide UBW, denies pt with wt loss PTA. Reports pt with mild nausea without vomiting this AM 12/14 status post medication administration, reports otherwise no GI concerns, states pt's last BM yesterday 12/13. Denies pt with difficulty chewing/swallowing. Confirms NKFA. No Judaism/ethnic/cultural food preferences noted. No pressure injuries or edema documented, Oliver score 21. See nutrition recommendations. RD to remain available.

## 2023-12-14 NOTE — DIETITIAN INITIAL EVALUATION ADULT - OTHER CALCULATIONS
Estimated needs based on IBW as dosing wt 210lb/95.3kg >120% IBW (168%). Needs adjusted for age, BMI, and pre-op status.

## 2023-12-14 NOTE — DIETITIAN INITIAL EVALUATION ADULT - ADD RECOMMEND
1. As medically feasible status post OR, recommend resume Regular diet.   2. Monitor GI tolerance, weight trends, labs, & skin integrity.  3. Defer bowel and pain regimens to team.   4. RD to remain available for diet education/intervention prn.

## 2023-12-14 NOTE — DIETITIAN INITIAL EVALUATION ADULT - NS FNS DIET ORDER
Diet, NPO after Midnight:      NPO Start Date: 13-Dec-2023,   NPO Start Time: 23:59  Except Medications (12-13-23 @ 09:31) [Active]  Diet, Regular (12-11-23 @ 18:53) [Active]

## 2023-12-14 NOTE — PRE-ANESTHESIA EVALUATION ADULT - NSANTHAGERD_ENT_A_CORE
[MRI] : MRI [Lumbar Spine] : lumbar spine [Report was reviewed and noted in the chart] : The report was reviewed and noted in the chart [I independently reviewed and interpreted images and report] : I independently reviewed and interpreted images and report [I reviewed the films/CD and additionally noted] : I reviewed the films/CD and additionally noted [FreeTextEntry1] : I stop paperwork reviewed No

## 2023-12-15 ENCOUNTER — TRANSCRIPTION ENCOUNTER (OUTPATIENT)
Age: 43
End: 2023-12-15

## 2023-12-15 LAB
ANION GAP SERPL CALC-SCNC: 10 MMOL/L — SIGNIFICANT CHANGE UP (ref 5–17)
ANION GAP SERPL CALC-SCNC: 10 MMOL/L — SIGNIFICANT CHANGE UP (ref 5–17)
BUN SERPL-MCNC: 10 MG/DL — SIGNIFICANT CHANGE UP (ref 7–23)
BUN SERPL-MCNC: 10 MG/DL — SIGNIFICANT CHANGE UP (ref 7–23)
CALCIUM SERPL-MCNC: 9 MG/DL — SIGNIFICANT CHANGE UP (ref 8.4–10.5)
CALCIUM SERPL-MCNC: 9 MG/DL — SIGNIFICANT CHANGE UP (ref 8.4–10.5)
CHLORIDE SERPL-SCNC: 105 MMOL/L — SIGNIFICANT CHANGE UP (ref 96–108)
CHLORIDE SERPL-SCNC: 105 MMOL/L — SIGNIFICANT CHANGE UP (ref 96–108)
CO2 SERPL-SCNC: 23 MMOL/L — SIGNIFICANT CHANGE UP (ref 22–31)
CO2 SERPL-SCNC: 23 MMOL/L — SIGNIFICANT CHANGE UP (ref 22–31)
CREAT SERPL-MCNC: 0.48 MG/DL — LOW (ref 0.5–1.3)
CREAT SERPL-MCNC: 0.48 MG/DL — LOW (ref 0.5–1.3)
EGFR: 123 ML/MIN/1.73M2 — SIGNIFICANT CHANGE UP
EGFR: 123 ML/MIN/1.73M2 — SIGNIFICANT CHANGE UP
GLUCOSE SERPL-MCNC: 155 MG/DL — HIGH (ref 70–99)
GLUCOSE SERPL-MCNC: 155 MG/DL — HIGH (ref 70–99)
HCT VFR BLD CALC: 36.2 % — SIGNIFICANT CHANGE UP (ref 34.5–45)
HCT VFR BLD CALC: 36.2 % — SIGNIFICANT CHANGE UP (ref 34.5–45)
HGB BLD-MCNC: 12.5 G/DL — SIGNIFICANT CHANGE UP (ref 11.5–15.5)
HGB BLD-MCNC: 12.5 G/DL — SIGNIFICANT CHANGE UP (ref 11.5–15.5)
MAGNESIUM SERPL-MCNC: 1.8 MG/DL — SIGNIFICANT CHANGE UP (ref 1.6–2.6)
MAGNESIUM SERPL-MCNC: 1.8 MG/DL — SIGNIFICANT CHANGE UP (ref 1.6–2.6)
MCHC RBC-ENTMCNC: 29.5 PG — SIGNIFICANT CHANGE UP (ref 27–34)
MCHC RBC-ENTMCNC: 29.5 PG — SIGNIFICANT CHANGE UP (ref 27–34)
MCHC RBC-ENTMCNC: 34.5 GM/DL — SIGNIFICANT CHANGE UP (ref 32–36)
MCHC RBC-ENTMCNC: 34.5 GM/DL — SIGNIFICANT CHANGE UP (ref 32–36)
MCV RBC AUTO: 85.4 FL — SIGNIFICANT CHANGE UP (ref 80–100)
MCV RBC AUTO: 85.4 FL — SIGNIFICANT CHANGE UP (ref 80–100)
NRBC # BLD: 0 /100 WBCS — SIGNIFICANT CHANGE UP (ref 0–0)
NRBC # BLD: 0 /100 WBCS — SIGNIFICANT CHANGE UP (ref 0–0)
PHOSPHATE SERPL-MCNC: 2.9 MG/DL — SIGNIFICANT CHANGE UP (ref 2.5–4.5)
PHOSPHATE SERPL-MCNC: 2.9 MG/DL — SIGNIFICANT CHANGE UP (ref 2.5–4.5)
PLATELET # BLD AUTO: 279 K/UL — SIGNIFICANT CHANGE UP (ref 150–400)
PLATELET # BLD AUTO: 279 K/UL — SIGNIFICANT CHANGE UP (ref 150–400)
POTASSIUM SERPL-MCNC: 4.3 MMOL/L — SIGNIFICANT CHANGE UP (ref 3.5–5.3)
POTASSIUM SERPL-MCNC: 4.3 MMOL/L — SIGNIFICANT CHANGE UP (ref 3.5–5.3)
POTASSIUM SERPL-SCNC: 4.3 MMOL/L — SIGNIFICANT CHANGE UP (ref 3.5–5.3)
POTASSIUM SERPL-SCNC: 4.3 MMOL/L — SIGNIFICANT CHANGE UP (ref 3.5–5.3)
RBC # BLD: 4.24 M/UL — SIGNIFICANT CHANGE UP (ref 3.8–5.2)
RBC # BLD: 4.24 M/UL — SIGNIFICANT CHANGE UP (ref 3.8–5.2)
RBC # FLD: 12.8 % — SIGNIFICANT CHANGE UP (ref 10.3–14.5)
RBC # FLD: 12.8 % — SIGNIFICANT CHANGE UP (ref 10.3–14.5)
SODIUM SERPL-SCNC: 138 MMOL/L — SIGNIFICANT CHANGE UP (ref 135–145)
SODIUM SERPL-SCNC: 138 MMOL/L — SIGNIFICANT CHANGE UP (ref 135–145)
WBC # BLD: 9.68 K/UL — SIGNIFICANT CHANGE UP (ref 3.8–10.5)
WBC # BLD: 9.68 K/UL — SIGNIFICANT CHANGE UP (ref 3.8–10.5)
WBC # FLD AUTO: 9.68 K/UL — SIGNIFICANT CHANGE UP (ref 3.8–10.5)
WBC # FLD AUTO: 9.68 K/UL — SIGNIFICANT CHANGE UP (ref 3.8–10.5)

## 2023-12-15 PROCEDURE — 99232 SBSQ HOSP IP/OBS MODERATE 35: CPT

## 2023-12-15 RX ORDER — MAGNESIUM SULFATE 500 MG/ML
1 VIAL (ML) INJECTION ONCE
Refills: 0 | Status: COMPLETED | OUTPATIENT
Start: 2023-12-15 | End: 2023-12-15

## 2023-12-15 RX ORDER — HYDROMORPHONE HYDROCHLORIDE 2 MG/ML
0.5 INJECTION INTRAMUSCULAR; INTRAVENOUS; SUBCUTANEOUS ONCE
Refills: 0 | Status: DISCONTINUED | OUTPATIENT
Start: 2023-12-15 | End: 2023-12-15

## 2023-12-15 RX ORDER — SODIUM,POTASSIUM PHOSPHATES 278-250MG
1 POWDER IN PACKET (EA) ORAL ONCE
Refills: 0 | Status: COMPLETED | OUTPATIENT
Start: 2023-12-15 | End: 2023-12-15

## 2023-12-15 RX ADMIN — Medication 5 MILLIGRAM(S): at 13:15

## 2023-12-15 RX ADMIN — TRAMADOL HYDROCHLORIDE 50 MILLIGRAM(S): 50 TABLET ORAL at 04:00

## 2023-12-15 RX ADMIN — PANTOPRAZOLE SODIUM 40 MILLIGRAM(S): 20 TABLET, DELAYED RELEASE ORAL at 05:58

## 2023-12-15 RX ADMIN — TRAMADOL HYDROCHLORIDE 25 MILLIGRAM(S): 50 TABLET ORAL at 05:58

## 2023-12-15 RX ADMIN — Medication 1000 MILLIGRAM(S): at 13:15

## 2023-12-15 RX ADMIN — SENNA PLUS 2 TABLET(S): 8.6 TABLET ORAL at 21:57

## 2023-12-15 RX ADMIN — TRAMADOL HYDROCHLORIDE 25 MILLIGRAM(S): 50 TABLET ORAL at 07:00

## 2023-12-15 RX ADMIN — TRAMADOL HYDROCHLORIDE 50 MILLIGRAM(S): 50 TABLET ORAL at 18:38

## 2023-12-15 RX ADMIN — Medication 5 MILLIGRAM(S): at 05:58

## 2023-12-15 RX ADMIN — Medication 6 MILLIGRAM(S): at 05:59

## 2023-12-15 RX ADMIN — TRAMADOL HYDROCHLORIDE 50 MILLIGRAM(S): 50 TABLET ORAL at 17:38

## 2023-12-15 RX ADMIN — Medication 5 MILLIGRAM(S): at 21:57

## 2023-12-15 RX ADMIN — CHLORHEXIDINE GLUCONATE 1 APPLICATION(S): 213 SOLUTION TOPICAL at 05:59

## 2023-12-15 RX ADMIN — Medication 1000 MILLIGRAM(S): at 07:00

## 2023-12-15 RX ADMIN — Medication 100 GRAM(S): at 05:58

## 2023-12-15 RX ADMIN — Medication 1000 MILLIGRAM(S): at 22:45

## 2023-12-15 RX ADMIN — TRAMADOL HYDROCHLORIDE 50 MILLIGRAM(S): 50 TABLET ORAL at 02:40

## 2023-12-15 RX ADMIN — Medication 100 MILLIGRAM(S): at 06:45

## 2023-12-15 RX ADMIN — Medication 1000 MILLIGRAM(S): at 14:00

## 2023-12-15 RX ADMIN — Medication 1000 MILLIGRAM(S): at 05:57

## 2023-12-15 RX ADMIN — Medication 6 MILLIGRAM(S): at 17:29

## 2023-12-15 RX ADMIN — Medication 6 MILLIGRAM(S): at 11:43

## 2023-12-15 RX ADMIN — Medication 5 MILLIGRAM(S): at 00:12

## 2023-12-15 RX ADMIN — TRAMADOL HYDROCHLORIDE 25 MILLIGRAM(S): 50 TABLET ORAL at 16:00

## 2023-12-15 RX ADMIN — HYDROMORPHONE HYDROCHLORIDE 0.5 MILLIGRAM(S): 2 INJECTION INTRAMUSCULAR; INTRAVENOUS; SUBCUTANEOUS at 04:50

## 2023-12-15 RX ADMIN — TRAMADOL HYDROCHLORIDE 50 MILLIGRAM(S): 50 TABLET ORAL at 10:08

## 2023-12-15 RX ADMIN — Medication 1 PACKET(S): at 05:57

## 2023-12-15 RX ADMIN — TRAMADOL HYDROCHLORIDE 50 MILLIGRAM(S): 50 TABLET ORAL at 11:00

## 2023-12-15 RX ADMIN — TRAMADOL HYDROCHLORIDE 25 MILLIGRAM(S): 50 TABLET ORAL at 15:00

## 2023-12-15 RX ADMIN — Medication 1000 MILLIGRAM(S): at 21:57

## 2023-12-15 RX ADMIN — HYDROMORPHONE HYDROCHLORIDE 0.5 MILLIGRAM(S): 2 INJECTION INTRAMUSCULAR; INTRAVENOUS; SUBCUTANEOUS at 04:35

## 2023-12-15 NOTE — PHYSICAL THERAPY INITIAL EVALUATION ADULT - ADDITIONAL COMMENTS
pt lives w/ her family in an elevator acces apt building w/ no stairs to enter. States she was independent in ADLs prior to this admission. Denies hx of recent falls.

## 2023-12-15 NOTE — PHYSICAL THERAPY INITIAL EVALUATION ADULT - GENERAL OBSERVATIONS, REHAB EVAL
pt received semi-supine in bed +heplock, +tele, +cervical collar and cervical dressing C/D/I, sister present, c/o mild neck pain

## 2023-12-15 NOTE — PHYSICAL THERAPY INITIAL EVALUATION ADULT - MODALITIES TREATMENT COMMENTS
smile: symmetrical. visual tracking (H test): smooth pursuit. visual field test (quadrant test): WNL B/L.

## 2023-12-15 NOTE — OCCUPATIONAL THERAPY INITIAL EVALUATION ADULT - LEVEL OF INDEPENDENCE: DRESS LOWER BODY, OT EVAL
donning b/l socks while seated at EOB utilizing cross legged method to adhere to spinal/cervical precautions/contact guard

## 2023-12-15 NOTE — OCCUPATIONAL THERAPY INITIAL EVALUATION ADULT - ADDITIONAL COMMENTS
Patient reports living with her  and children in an elevator access apartment building with ramp to enter. Patient states she was independent with all ADL's, IADL's and functional mobility with no AD prior to admission. Patient is R hand dominant and wears glasses.

## 2023-12-15 NOTE — DISCHARGE NOTE PROVIDER - NSDCFUADDINST_GEN_ALL_CORE_FT
Neurosurgery follow up appointment date/time:  - are staples/sutures in place?  - what day should staples/sutures be removed (POD 10-14)?  - please call the office to confirm appointment:     Wound Care:  - You may remove your cervical collar to shower. Wash incision gently with soapy water and pat dry with a clean towel.   - no picking at incision    Devices:  - Wear [place] J cervical collar at all times except when showering until cleared by Dr. Price.      Activity:  - fatigue is common after surgery, rest if you feel tired   - no bending, lifting, twisting or heavy lifting   - walking is recommended, ambulate as tolerated  - you may shower when you get home, keep your incision dry  - no soaking in a tub/pool/hot tub   - no driving within 24 hours of anesthesia or while taking prescription pain medications   - keep hydrated, drink plenty of water    ..    Please also follow up with your primary care doctor.     Pain Expectations:  - pain after surgery is expected  - please take pain meds as prescribed     Medications:  - changes to home meds (ex. AED's)?  - new meds?  - pain meds?  - when can antiplatelets or anticoagulants be restarted?  - were adverse affects of meds discussed with patients?   - pain medications can cause constipation, you should eat a high fiber diet and may take a stool softener while on pain meds   - Avoid taking Advil (ibuprofen), Motrin (naproxen), or Aspirin for pain as they can cause bleeding     Call the office or come to ED if:  - wound has drainage or bleeding, increased redness or pain at incision site, neurological change, fever (>101), chills, night sweats, syncope, nausea/vomiting, chest pain, shortness of breath      Playback:  - are discharge instruction on playback?  - is a picture of the incision on playback?     WITHIN 24 HOURS OF DISCHARGE, PLEASE CONTACT NEURO PA  WITH ANY QUESTIONS OR CONCERNS: [phone]   OTHERWISE, PLEASE CALL THE OFFICE WITH ANY QUESTIONS OR CONCERNS: [phone] Neurosurgery follow up appointment date/time:  - are staples/sutures in place?  - what day should staples/sutures be removed (POD 10-14)?  - please call the office to confirm appointment:     Wound Care:  - You may remove your cervical collar to shower. Wash incision gently with soapy water and pat dry with a clean towel.   - no picking at incision    Devices:  - Wear [place] J cervical collar at all times except when showering until cleared by Dr. Price.      Activity:  - fatigue is common after surgery, rest if you feel tired   - no bending, lifting, twisting or heavy lifting   - walking is recommended, ambulate as tolerated  - you may shower when you get home, keep your incision dry  - no soaking in a tub/pool/hot tub   - no driving within 24 hours of anesthesia or while taking prescription pain medications   - keep hydrated, drink plenty of water    ..    Please also follow up with your primary care doctor.     Pain Expectations:  - pain after surgery is expected  - please take pain meds as prescribed     Medications:  - changes to home meds (ex. AED's)?  - new meds?  - pain meds?  - when can antiplatelets or anticoagulants be restarted?  - were adverse affects of meds discussed with patients?   - pain medications can cause constipation, you should eat a high fiber diet and may take a stool softener while on pain meds   - Avoid taking Advil (ibuprofen), Motrin (naproxen), or Aspirin for pain as they can cause bleeding     Call the office or come to ED if:  - wound has drainage or bleeding, increased redness or pain at incision site, neurological change, fever (>101), chills, night sweats, syncope, nausea/vomiting, chest pain, shortness of breath      Playback:  - are discharge instruction on playback?  - is a picture of the incision on playback?     WITHIN 24 HOURS OF DISCHARGE, PLEASE CONTACT NEURO PA  WITH ANY QUESTIONS OR CONCERNS: [phone]   OTHERWISE, PLEASE CALL THE OFFICE WITH ANY QUESTIONS OR CONCERNS: [phone] Neurosurgery follow up appointment date/time:  - You will have a follow-up appointment with Dr. Price on 12/28 for a wound check and suture removal as needed  - please call the office to confirm or change the appointment as needed.    Wound Care:  - You may remove your cervical collar to shower. Wash incision gently with soapy water and pat dry with a clean towel.   - no picking at incision. Do not apply any creams or bandages to the incision.  - Please contact the office right away if you notice any significant redness or swelling, discharge such as bleeding or other clear fluid, or the incision appears to be opening up.    Devices:  - Wear [place] J cervical collar at all times except when showering until cleared by Dr. Price.  - Commode and shower chair will be delivered to your home after hospital discharge.    Activity:  - fatigue is common after surgery, rest if you feel tired   - no bending, lifting, twisting or heavy lifting   - walking is recommended, ambulate as tolerated  - you may shower when you get home, keep your incision dry  - no soaking in a tub/pool/hot tub   - keep hydrated, drink plenty of water      Pain Expectations:  - pain after surgery is expected  - please take pain meds as prescribed   - please take stool softeners to avoid constipation. These are over the counter.    Medications:  - Tramadol 50mg every 6 hours as you need it for moderate-severe pain.  - Valium 5mg every 8 hours as you need it for muscle spasm  - Decadron taper as ordered until the 21st. Please use Pantoprazole or Omeprazole during this time to avoid stomach upset.  - Use Senna or Dulcolax as stool softeners/laxatives to avoid constipation. No prescription required.  - Use Tylenol 1000mg every 8 hours for the next week to help with pain control, then you can continue taking is at needed every 8 hours.  - pain medications can cause constipation, you should eat a high fiber diet and may take a stool softener while on pain meds   - Avoid taking Advil (ibuprofen), Motrin (naproxen), or Aspirin for pain as they can cause bleeding     Call the office or come to ED if:  - wound has drainage or bleeding, increased redness or pain at incision site, neurological change, fever (>101), chills, night sweats, syncope, nausea/vomiting, chest pain, shortness of breath      Playback:  - A copy of your discharge summary and a recording of the discharge instructions are available in the [facility] on your mobile device.    WITHIN 24 HOURS OF DISCHARGE, PLEASE CONTACT NEURO PA  WITH ANY QUESTIONS OR CONCERNS: [phone]   OTHERWISE, PLEASE CALL THE OFFICE WITH ANY QUESTIONS OR CONCERNS: [phone] Neurosurgery follow up appointment date/time:  - You will have a follow-up appointment with Dr. Price on 12/28 for a wound check and suture removal as needed  - please call the office to confirm or change the appointment as needed.    Wound Care:  - You may remove your cervical collar to shower. Wash incision gently with soapy water and pat dry with a clean towel.   - no picking at incision. Do not apply any creams or bandages to the incision.  - Please contact the office right away if you notice any significant redness or swelling, discharge such as bleeding or other clear fluid, or the incision appears to be opening up.    Devices:  - Wear [place] J cervical collar at all times except when showering until cleared by Dr. Price.  - Commode and shower chair will be delivered to your home after hospital discharge.    Activity:  - fatigue is common after surgery, rest if you feel tired   - no bending, lifting, twisting or heavy lifting   - walking is recommended, ambulate as tolerated  - you may shower when you get home, keep your incision dry  - no soaking in a tub/pool/hot tub   - keep hydrated, drink plenty of water      Pain Expectations:  - pain after surgery is expected  - please take pain meds as prescribed   - please take stool softeners to avoid constipation. These are over the counter.    Medications:  - Tramadol 50mg every 6 hours as you need it for moderate-severe pain.  - Valium 5mg every 8 hours as you need it for muscle spasm  - Decadron taper as ordered until the 21st. Please use Pantoprazole or Omeprazole during this time to avoid stomach upset.  - Use Senna or Dulcolax as stool softeners/laxatives to avoid constipation. No prescription required.  - Use Tylenol 1000mg every 8 hours for the next week to help with pain control, then you can continue taking is at needed every 8 hours.  - pain medications can cause constipation, you should eat a high fiber diet and may take a stool softener while on pain meds   - Avoid taking Advil (ibuprofen), Motrin (naproxen), or Aspirin for pain as they can cause bleeding     Call the office or come to ED if:  - wound has drainage or bleeding, increased redness or pain at incision site, neurological change, fever (>101), chills, night sweats, syncope, nausea/vomiting, chest pain, shortness of breath      Playback:  - A copy of your discharge summary and a recording of the discharge instructions are available in the [facility] on your mobile device.    WITHIN 24 HOURS OF DISCHARGE, PLEASE CONTACT NEURO PA  WITH ANY QUESTIONS OR CONCERNS: [phone]   OTHERWISE, PLEASE CALL THE OFFICE WITH ANY QUESTIONS OR CONCERNS: [phone]

## 2023-12-15 NOTE — PROGRESS NOTE ADULT - ASSESSMENT
ASSESSMENT:   POD 1 S/P SOC, C1 laminectomy and duroplasty for chiari decompression (12/14/23) .     PLAN:   NEURO:  Q1h neuro checks  Valium 5q8 x2d  Hard C-collar  Analgesia prn  Activity: [] mobilize as tolerated [] Bedrest [x] PT [x] OT [] PMNR    PULM:  Incentive spirometry, mobilize as tolerated    CV:  -150mmHg, d/c a-nora    RENAL:  IVF until good PO intake, d/c salas     GI:  Diet: As tolerated  GI prophylaxis [] not indicated [x] PPI [] other:  Bowel regimen [] colace [] senna [] other:    ENDO:   Goal euglycemia (-180)    HEME/ONC:  VTE prophylaxis: [x] SCDs [] chemoprophylaxis  [x] hold chemoprophylaxis due to: fresh post op    ID:  Patty-op antibiotics    MISC:    SOCIAL/FAMILY:  [] awaiting [] updated at bedside [] family meeting    CODE STATUS:  [] Full Code [] DNR [] DNI [] Palliative/Comfort Care    DISPOSITION:  [] ICU [] Stroke Unit [] Floor [] EMU [] RCU [] PCU     ASSESSMENT:   POD 1 S/P SOC, C1 laminectomy and duroplasty for Chiari decompression (12/14/23) .     PLAN:   NEURO:  Q1h neuro checks  Valium 5mg Q8 for 2 days  Hard C-collar  Analgesia prn  Activity: [] mobilize as tolerated [] Bedrest [x] PT [x] OT [] PMNR    PULM:  Incentive spirometry, mobilize as tolerated    CV:  -150mmHg, d/c a-line    RENAL:  IVF until good PO intake, d/c salas     GI:  Diet: As tolerated  GI prophylaxis [] not indicated [x] PPI [] other:  Bowel regimen [] colace [] senna [] other:    ENDO:   Goal euglycemia (-180)    HEME/ONC:  VTE prophylaxis: [x] SCDs, ambulate  [x] hold chemoprophylaxis due to: fresh post op      ID:  Patty-op antibiotics    MISC:    SOCIAL/FAMILY:  [] awaiting [x] updated at bedside [] family meeting    CODE STATUS:  [x] Full Code [] DNR [] DNI [] Palliative/Comfort Care    DISPOSITION:  [] ICU [] Stroke Unit [x] Floor [] EMU [] RCU [] PCU    ICU stepdown Checklist:    Completed: 12-15 @ 09:18    [X] hemodynamically stable – VS WNL and stable x 24hours, UO adequate  [n/a ] if  previously on HDA - off pressors x 24h with stable neuro exam    [X] no new symptoms x 24h (i.e. new fever, new-onset nausea/vomiting)  [X] stable labs: (i.e. WBC not rising, sodium not dropping)  [X] patient not at high risk for aspiration, if high risk then:                  [ ] should have definitive plans for trach/PEG (alternative option is to discharge from ICU to facilty)                  [ ] stepdown to bed close to nurse’s station  [n/a] low suctioning requirements (i.e. q4h or less)  [X] sign-off from primary RN*  [X] drains do not require ICU level of care  [X] if patient previously agitated or with behavioral issues – controlled   [X] pain controlled

## 2023-12-15 NOTE — DISCHARGE NOTE PROVIDER - NSDCMRMEDTOKEN_GEN_ALL_CORE_FT
acetaminophen 500 mg oral tablet: 2 tab(s) orally every 8 hours as needed for  mild pain  bedside commode: bedside commode x 1  dexAMETHasone 2 mg oral tablet: 2 tab(s) orally every 8 hours 2 tabs at 6pm on 12/17, then 2 tabs every 8 hours on 12/18, 1 tab every 8 hours on 12/19, 1 tab every 12 hours on 12/20, and 1 tab once a day on 12/21.  diazePAM 5 mg oral tablet: 1 tab(s) orally every 8 hours as needed for muscle spasm MDD: 3 tabs  naloxone 4 mg/0.1 mL nasal spray: 4 milligram(s) intranasally once a day as needed for  opioid overdose for opioid overdose  pantoprazole 40 mg oral delayed release tablet: 1 tab(s) orally once a day (before a meal)  senna leaf extract oral tablet: 2 tab(s) orally once a day (at bedtime)  shower chair: shower chair x 1  traMADol 50 mg oral tablet: 1 tab(s) orally every 6 hours as needed for Severe Pain (7 - 10) 0.5 tabs every 6 hours as needed for moderate pain or 1 tab every 6 hours as needed for severe pain MDD: 4 tabs   acetaminophen 500 mg oral tablet: 2 tab(s) orally every 8 hours as needed for  mild pain  bedside commode: bedside commode x 1  dexAMETHasone 2 mg oral tablet: 2 tab(s) orally every 8 hours 2 tabs at 6pm on 12/17, then 2 tabs every 8 hours on 12/18, 1 tab every 8 hours on 12/19, 1 tab every 12 hours on 12/20, and 1 tab once a day on 12/21.  diazePAM 5 mg oral tablet: 1 tab(s) orally every 8 hours as needed for muscle spasm MDD: 3 tabs  naloxone 4 mg/0.1 mL nasal spray: 4 milligram(s) intranasally once a day as needed for  opioid overdose for opioid overdose  pantoprazole 40 mg oral delayed release tablet: 1 tab(s) orally once a day (before a meal)  polyethylene glycol 3350 oral powder for reconstitution: 17 gram(s) orally once a day  senna leaf extract oral tablet: 2 tab(s) orally once a day (at bedtime)  shower chair: shower chair x 1  traMADol 50 mg oral tablet: 1 tab(s) orally every 6 hours as needed for Severe Pain (7 - 10) 0.5 tabs every 6 hours as needed for moderate pain or 1 tab every 6 hours as needed for severe pain MDD: 4 tabs

## 2023-12-15 NOTE — OCCUPATIONAL THERAPY INITIAL EVALUATION ADULT - DIAGNOSIS, OT EVAL
Patient POD #1 s/p C1 Laminectomy and duroplasty for chiari decompression, presents with cervical/spinal precautions, decreased postural control, balance and activity tolerance impacting independence with functional activities and mobility.

## 2023-12-15 NOTE — DISCHARGE NOTE PROVIDER - CARE PROVIDER_API CALL
Estuardo Price.  Neurosurgery  [address] [facility] [place] [address]  Phone: [phone]  Fax: [phone]  Follow Up Time:    Estuardo Price.  Neurosurgery  [address] [place] [address]  Phone: [phone]  Fax: [phone]  Follow Up Time:

## 2023-12-15 NOTE — DISCHARGE NOTE PROVIDER - NSDCCPTREATMENT_GEN_ALL_CORE_FT
PRINCIPAL PROCEDURE  Procedure: Craniectomy, for Chiari malformation treatment  Findings and Treatment: SOC, C1 laminectomy and duroplasty for chiari decompression

## 2023-12-15 NOTE — DISCHARGE NOTE PROVIDER - CARE PROVIDERS DIRECT ADDRESSES
[email or web address]

## 2023-12-15 NOTE — DISCHARGE NOTE PROVIDER - NSDCACTIVITY_GEN_ALL_CORE
Do not drive or operate machinery/Walking - Indoors allowed/No heavy lifting/straining/Walking - Outdoors allowed/Follow Instructions Provided by your Surgical Team Do not drive or operate machinery/Showering allowed/Stairs allowed/Walking - Indoors allowed/No heavy lifting/straining/Walking - Outdoors allowed/Follow Instructions Provided by your Surgical Team

## 2023-12-15 NOTE — OCCUPATIONAL THERAPY INITIAL EVALUATION ADULT - GENERAL OBSERVATIONS, REHAB EVAL
Patient sister present. PT Elsa present. Patient A&Ox4, agreeable and tolerated session fairly well. Patient received semisupine in bed +tele, +hard cervical collar, +cervical dressing C/D/I, +b/l SCD's, room air, NAD.

## 2023-12-15 NOTE — OCCUPATIONAL THERAPY INITIAL EVALUATION ADULT - MODIFIED CLINICAL TEST OF SENSORY INTEGRATION IN BALANCE TEST
Patient functionally ambulated in the hallway and to the toilet with CG hand held assist. Patient noted with slightly decreased balance, postural control 2/2 pain and dizziness however VSS

## 2023-12-15 NOTE — PHYSICAL THERAPY INITIAL EVALUATION ADULT - PERTINENT HX OF CURRENT PROBLEM, REHAB EVAL
43 y/o F with PMH chiari malformation presents to ED c/o persistent worsened headache and neck pain x 2 days. Plan for OR 12/14.

## 2023-12-15 NOTE — OCCUPATIONAL THERAPY INITIAL EVALUATION ADULT - PERTINENT HX OF CURRENT PROBLEM, REHAB EVAL
43 y/o F with pmh chiari malformation presents to ED c/o persistent worsened headache and neck pain x 2 days. Patient reports that she has experienced headaches intermittently for more than 10 years related to her chiari malformation. The pain typically is elicited with quick head movements or laughing hard but usually resolves. Patient reports that she also has some mild dizziness associated with her recent headache. She has taken tylenol and motrin over the counter for pain without relief. Patient denies nausea, vomiting, vision changes, extremity numbness or weakness, speech changes and confusion. (11 Dec 2023 18:40) 41 y/o F with pmh chiari malformation presents to ED c/o persistent worsened headache and neck pain x 2 days. Patient reports that she has experienced headaches intermittently for more than 10 years related to her chiari malformation. The pain typically is elicited with quick head movements or laughing hard but usually resolves. Patient reports that she also has some mild dizziness associated with her recent headache. She has taken tylenol and motrin over the counter for pain without relief. Patient denies nausea, vomiting, vision changes, extremity numbness or weakness, speech changes and confusion. (11 Dec 2023 18:40)

## 2023-12-15 NOTE — DISCHARGE NOTE PROVIDER - NSDCFUADDAPPT_GEN_ALL_CORE_FT
Please call Dr. Price' office for a follow up appointment.    Please follow up with your primary care provider. Please call Dr. Price' office to confirm your appointment on 12/28 at 12pm.    Please follow up with your primary care provider. Please call Dr. Priec' office to confirm your appointment on 12/28 at 12pm.    Please follow up with your primary care provider.

## 2023-12-15 NOTE — PROGRESS NOTE ADULT - SUBJECTIVE AND OBJECTIVE BOX
=======================  ADULT NSICU PROGRESS NOTE  =======================    HPI:  43 y/o F with PMH chiari malformation presents to ED c/o persistent worsened headache and neck pain x 2 days. Patient reports that she has experienced headaches intermittently for more than 10 years related to her chiari malformation. The pain typically is elicited with quick head movements or laughing hard but usually resolves. Patient reports that she also has some mild dizziness associated with her recent headache. She has taken tylenol and motrin over the counter for pain without relief. Patient denies nausea, vomiting, vision changes, extremity numbness or weakness, speech changes and confusion. (11 Dec 2023 18:40)    On admission, the patient was:  GCS:  Jain-Amanda:  modified Terrazas:  ICH score:  NIHSS:        ICU Vital Signs Last 24 Hrs  T(C): 37 (15 Dec 2023 05:03), Max: 37.1 (15 Dec 2023 01:34)  T(F): 98.6 (15 Dec 2023 05:03), Max: 98.8 (15 Dec 2023 01:34)  HR: 85 (15 Dec 2023 06:00) (68 - 94)  BP: 123/67 (14 Dec 2023 20:10) (109/75 - 123/67)  BP(mean): 89 (14 Dec 2023 20:10) (89 - 89)  ABP: 120/60 (15 Dec 2023 06:00) (116/59 - 146/83)  ABP(mean): 82 (15 Dec 2023 06:00) (80 - 107)  RR: 18 (15 Dec 2023 06:00) (15 - 18)  SpO2: 95% (15 Dec 2023 06:00) (95% - 100%)      12-14-23 @ 07:01  -  12-15-23 @ 07:00  --------------------------------------------------------  IN: 1195 mL / OUT: 960 mL / NET: 235 mL      EXAMINATION:  General: Calm  HEENT:  MMM  Neuro: Awake, alert, oriented x 3, pupils 3mm and reactive, EOMI.  Power 5/5 in all extremities. Sensation intact  Cards:  S1/S2, RRR  Respiratory: Clear, no wheeze  Abdomen: Soft, non- tender  Extremities: No edema  Skin: Warm/dry        MEDICATIONS:  acetaminophen     Tablet .. 1000 milliGRAM(s) Oral every 8 hours  chlorhexidine 2% Cloths 1 Application(s) Topical <User Schedule>  dexAMETHasone     Tablet   Oral   dexAMETHasone     Tablet 6 milliGRAM(s) Oral every 6 hours  diazepam    Tablet 5 milliGRAM(s) Oral every 8 hours  influenza   Vaccine 0.5 milliLiter(s) IntraMuscular once  ondansetron Injectable 4 milliGRAM(s) IV Push every 6 hours PRN  pantoprazole    Tablet 40 milliGRAM(s) Oral before breakfast  senna 2 Tablet(s) Oral at bedtime  sodium chloride 0.9%. 1000 milliLiter(s) (95 mL/Hr) IV Continuous <Continuous>  traMADol 50 milliGRAM(s) Oral every 6 hours PRN  traMADol 25 milliGRAM(s) Oral every 6 hours PRN      LABS:                       12.5   9.68  )-----------( 279      ( 15 Dec 2023 04:31 )             36.2     12-15    138  |  105  |  10  ----------------------------<  155<H>  4.3   |  23  |  0.48<L>    Ca    9.0      15 Dec 2023 04:31  Phos  2.9     12-15  Mg     1.8     12-15      ABG - ( 14 Dec 2023 16:33 )  pH, Arterial: 7.42  pH, Blood: x     /  pCO2: 35    /  pO2: 231   / HCO3: 23    / Base Excess: -1.4  /  SaO2: x

## 2023-12-15 NOTE — PHYSICAL THERAPY INITIAL EVALUATION ADULT - GAIT DEVIATIONS NOTED, PT EVAL
decreased afshin/increased time in double stance/decreased step length/decreased weight-shifting ability

## 2023-12-15 NOTE — PROGRESS NOTE ADULT - SUBJECTIVE AND OBJECTIVE BOX
HPI:  43 y/o F with pmh chiari malformation presents to ED c/o persistent worsened headache and neck pain x 2 days. Patient reports that she has experienced headaches intermittently for more than 10 years related to her chiari malformation. The pain typically is elicited with quick head movements or laughing hard but usually resolves. Patient reports that she also has some mild dizziness associated with her recent headache. She has taken tylenol and motrin over the counter for pain without relief. Patient denies nausea, vomiting, vision changes, extremity numbness or weakness, speech changes and confusion. (11 Dec 2023 18:40)    INTERVAL EVENTS POST-OP: s/p SOC, C1 laminectomy and duroplasty for chiari decompression, Pt seen and evaluated at bedside after procedure. Endorsing 7/10 pain to surgical site. 0.5mg dilaudid IVx1 given for pain with improvement. 12/15 POD 1 chiari decompression, JOSAFAT ovn    Hospital Course:   12/11: adm, CTH/CT C spine complete  12/12: JOSAFAT overnight. MRI c-spine / MR brain w/ CSF flow pending, robaxin helping w/ neck stiffness, has medical clearance for chiari decompression Thursday 12/14. MRI read Chiari I malformation w tonsillar herniation.   12/13: Preop for 12/14. Consent obtained and signed in chart. Pending EKG and CXR final read.  12/14: Plan for OR today for chiari decompression. POD0 SOC, C1 laminectomy and duroplasty for chiari decompression ,   12/15: POD 1 SOC, C1 laminectomy and duroplasty for chiari decompression, JOSAFAT ovn      Vital Signs Last 24 Hrs  T(C): 36.4 (14 Dec 2023 22:04), Max: 36.8 (14 Dec 2023 03:58)  T(F): 97.6 (14 Dec 2023 22:04), Max: 98.2 (14 Dec 2023 03:58)  HR: 83 (15 Dec 2023 01:00) (68 - 85)  BP: 123/67 (14 Dec 2023 20:10) (108/77 - 123/67)  BP(mean): 89 (14 Dec 2023 20:10) (89 - 89)  RR: 15 (15 Dec 2023 01:00) (15 - 18)  SpO2: 97% (15 Dec 2023 01:00) (97% - 100%)    Parameters below as of 15 Dec 2023 01:00  Patient On (Oxygen Delivery Method): room air        I&O's Detail    14 Dec 2023 07:01  -  15 Dec 2023 01:19  --------------------------------------------------------  IN:    IV PiggyBack: 50 mL    sodium chloride 0.9%: 380 mL  Total IN: 430 mL    OUT:    Indwelling Catheter - Urethral (mL): 425 mL  Total OUT: 425 mL    Total NET: 5 mL        I&O's Summary    14 Dec 2023 07:01  -  15 Dec 2023 01:19  --------------------------------------------------------  IN: 430 mL / OUT: 425 mL / NET: 5 mL        PHYSICAL EXAM:  General: Pt is sitting up comfortably in bed, in NAD, on RA  HEENT: CN II-XII grossly intact, PERRL 3mm, EOMI B/L, face symmetric  Cardiovascular: RRR, normal S1 and S2   Respiratory: non-labored breathing, symmetric chest rise   GI: abd soft, NTND   Neuro: A&O x 3, no aphasia, speech clear, no dysmetria, no pronator drift  Strength 5/5 throughout all 4 extremities  Sensation intact to light touch throughout   Vascular: Distal pulses 2+ x4, no calf edema or erythema  Wound: SOC crani site with gauze/tegaderm stapled c/d/i    TUBES/LINES:  [] CVC  [x] A-line  [] Lumbar Drain  [] Ventriculostomy  [] SHERLYN  [] Salas  [] NGT   [] Other    DIET:  [] NPO  [x] ADAT Mechanical  [] Tube feeds    LABS:                        13.0   6.01  )-----------( 251      ( 14 Dec 2023 20:47 )             37.9     12-14    138  |  104  |  10  ----------------------------<  166<H>  3.9   |  23  |  0.51    Ca    9.2      14 Dec 2023 20:47  Phos  2.9     12-14  Mg     2.0     12-14      PT/INR - ( 14 Dec 2023 20:47 )   PT: 12.2 sec;   INR: 1.07          PTT - ( 14 Dec 2023 20:47 )  PTT:109.4 sec  Urinalysis Basic - ( 14 Dec 2023 20:47 )    Color: x / Appearance: x / SG: x / pH: x  Gluc: 166 mg/dL / Ketone: x  / Bili: x / Urobili: x   Blood: x / Protein: x / Nitrite: x   Leuk Esterase: x / RBC: x / WBC x   Sq Epi: x / Non Sq Epi: x / Bacteria: x          CAPILLARY BLOOD GLUCOSE          Drug Levels: [] N/A    CSF Analysis: [] N/A      Allergies    No Known Allergies    Intolerances        Home Medications:      MEDICATIONS:  Antibiotics:  ceFAZolin   IVPB 2000 milliGRAM(s) IV Intermittent every 8 hours    Neuro:  acetaminophen     Tablet .. 1000 milliGRAM(s) Oral every 8 hours  diazepam    Tablet 5 milliGRAM(s) Oral every 8 hours  ondansetron Injectable 4 milliGRAM(s) IV Push every 6 hours PRN  traMADol 25 milliGRAM(s) Oral every 6 hours PRN  traMADol 50 milliGRAM(s) Oral every 6 hours PRN    Anticoagulation:    OTHER:  chlorhexidine 2% Cloths 1 Application(s) Topical <User Schedule>  dexAMETHasone     Tablet 6 milliGRAM(s) Oral every 6 hours  dexAMETHasone     Tablet   Oral   influenza   Vaccine 0.5 milliLiter(s) IntraMuscular once  pantoprazole    Tablet 40 milliGRAM(s) Oral before breakfast  senna 2 Tablet(s) Oral at bedtime    IVF:  sodium chloride 0.9%. 1000 milliLiter(s) IV Continuous <Continuous>    CULTURES:    RADIOLOGY & ADDITIONAL TESTS:      ASSESSMENT:  43 y/o F with PMH chiari malformation presents to ED c/o persistent worsened headache and neck pain x 2 days. Now s/p SOC, C1 laminectomy and duroplasty for chiari decompression (12/14/23) .     HEADACHE    No pertinent family history in first degree relatives    Handoff    MEWS Score    No pertinent past medical history    Arachnoid cyst    Chiari I malformation    Headache    Arachnoid cyst    Craniectomy, for Chiari malformation treatment    H/O breast surgery    NECK PAIN    90+    SysAdmin_VisitLink        PLAN:  Plan:  Neuro:  - neuro/vital checks q1  - decadron 6q6 taper over 1 week to off  - pain control prn, tramadol 25/50 prn, valium 5q8 x2days  - MRI brain w/ CSF flow and MRI cervical spine 12/12: Chiari 1 malformation, no CSF flow obstruction  - CTH and CT cervical spine completed  - Cervical collar at all times except when showering    Cardiac:  - SBP goal 100-140    Pulmonary:  - on RA    GI:  - ADAT  - bowel regimen  - pantoprazole for hx gastritis and while on steroids    Renal:  - IVF while ADAT  - +salas, remove in AM    Heme:  - SCDs DVT ppx    Endo:  - no issues    ID:  - afebrile    Disposition: Regional status, full code, dispo pending OR 12/14    D/w Dr. Price    DVT PROPHYLAXIS:  [x] Venodynes                                [x] Heparin/Lovenox    FALL RISK:  [] Low Risk                                    [] Impulsive      Assessment:  Present when checked    []  GCS  E   V  M     Heart Failure: []Acute, [] acute on chronic , []chronic  Heart Failure:  [] Diastolic (HFpEF), [] Systolic (HFrEF), []Combined (HFpEF and HFrEF), [] RHF, [] Pulm HTN, [] Other    [] KANWAL, [] ATN, [] AIN, [] other  [] CKD1, [] CKD2, [] CKD 3, [] CKD 4, [] CKD 5, []ESRD    Encephalopathy: [] Metabolic, [] Hepatic, [] toxic, [] Neurological, [] Other    Abnormal Nurtitional Status: [] malnurtition (see nutrition note), [ ]underweight: BMI < 19, [] morbid obesity: BMI >40, [] Cachexia    [] Sepsis  [] hypovolemic shock,[] cardiogenic shock, [] hemorrhagic shock, [] neuogenic shock  [] Acute Respiratory Failure  []Cerebral edema, [] Brain compression/ herniation,   [] Functional quadriplegia  [] Acute blood loss anemia     HPI:  43 y/o F with pmh chiari malformation presents to ED c/o persistent worsened headache and neck pain x 2 days. Patient reports that she has experienced headaches intermittently for more than 10 years related to her chiari malformation. The pain typically is elicited with quick head movements or laughing hard but usually resolves. Patient reports that she also has some mild dizziness associated with her recent headache. She has taken tylenol and motrin over the counter for pain without relief. Patient denies nausea, vomiting, vision changes, extremity numbness or weakness, speech changes and confusion. (11 Dec 2023 18:40)    INTERVAL EVENTS POST-OP: s/p SOC, C1 laminectomy and duroplasty for chiari decompression, Pt seen and evaluated at bedside after procedure. Endorsing 7/10 pain to surgical site. 0.5mg dilaudid IVx1 given for pain with improvement. 12/15 POD 1 chiari decompression, JOSAFAT ovn    Hospital Course:   12/11: adm, CTH/CT C spine complete  12/12: JOSAFAT overnight. MRI c-spine / MR brain w/ CSF flow pending, robaxin helping w/ neck stiffness, has medical clearance for chiari decompression Thursday 12/14. MRI read Chiari I malformation w tonsillar herniation.   12/13: Preop for 12/14. Consent obtained and signed in chart. Pending EKG and CXR final read.  12/14: Plan for OR today for chiari decompression. POD0 SOC, C1 laminectomy and duroplasty for chiari decompression ,   12/15: POD 1 SOC, C1 laminectomy and duroplasty for chiari decompression, JOSAFAT ovn      Vital Signs Last 24 Hrs  T(C): 36.4 (14 Dec 2023 22:04), Max: 36.8 (14 Dec 2023 03:58)  T(F): 97.6 (14 Dec 2023 22:04), Max: 98.2 (14 Dec 2023 03:58)  HR: 83 (15 Dec 2023 01:00) (68 - 85)  BP: 123/67 (14 Dec 2023 20:10) (108/77 - 123/67)  BP(mean): 89 (14 Dec 2023 20:10) (89 - 89)  RR: 15 (15 Dec 2023 01:00) (15 - 18)  SpO2: 97% (15 Dec 2023 01:00) (97% - 100%)    Parameters below as of 15 Dec 2023 01:00  Patient On (Oxygen Delivery Method): room air        I&O's Detail    14 Dec 2023 07:01  -  15 Dec 2023 01:19  --------------------------------------------------------  IN:    IV PiggyBack: 50 mL    sodium chloride 0.9%: 380 mL  Total IN: 430 mL    OUT:    Indwelling Catheter - Urethral (mL): 425 mL  Total OUT: 425 mL    Total NET: 5 mL        I&O's Summary    14 Dec 2023 07:01  -  15 Dec 2023 01:19  --------------------------------------------------------  IN: 430 mL / OUT: 425 mL / NET: 5 mL        PHYSICAL EXAM:  General: Pt is sitting up comfortably in bed, in NAD, on RA  HEENT: CN II-XII grossly intact, PERRL 3mm, EOMI B/L, face symmetric  Cardiovascular: RRR, normal S1 and S2   Respiratory: non-labored breathing, symmetric chest rise   GI: abd soft, NTND   Neuro: A&O x 3, no aphasia, speech clear, no dysmetria, no pronator drift  Strength 5/5 throughout all 4 extremities  Sensation intact to light touch throughout   Vascular: Distal pulses 2+ x4, no calf edema or erythema  Wound: SOC crani site with gauze/tegaderm stapled c/d/i    TUBES/LINES:  [] CVC  [x] A-line  [] Lumbar Drain  [] Ventriculostomy  [] SHERLYN  [] Salas  [] NGT   [] Other    DIET:  [] NPO  [x] ADAT Mechanical  [] Tube feeds    LABS:                        13.0   6.01  )-----------( 251      ( 14 Dec 2023 20:47 )             37.9     12-14    138  |  104  |  10  ----------------------------<  166<H>  3.9   |  23  |  0.51    Ca    9.2      14 Dec 2023 20:47  Phos  2.9     12-14  Mg     2.0     12-14      PT/INR - ( 14 Dec 2023 20:47 )   PT: 12.2 sec;   INR: 1.07          PTT - ( 14 Dec 2023 20:47 )  PTT:109.4 sec  Urinalysis Basic - ( 14 Dec 2023 20:47 )    Color: x / Appearance: x / SG: x / pH: x  Gluc: 166 mg/dL / Ketone: x  / Bili: x / Urobili: x   Blood: x / Protein: x / Nitrite: x   Leuk Esterase: x / RBC: x / WBC x   Sq Epi: x / Non Sq Epi: x / Bacteria: x          CAPILLARY BLOOD GLUCOSE          Drug Levels: [] N/A    CSF Analysis: [] N/A      Allergies    No Known Allergies    Intolerances        Home Medications:      MEDICATIONS:  Antibiotics:  ceFAZolin   IVPB 2000 milliGRAM(s) IV Intermittent every 8 hours    Neuro:  acetaminophen     Tablet .. 1000 milliGRAM(s) Oral every 8 hours  diazepam    Tablet 5 milliGRAM(s) Oral every 8 hours  ondansetron Injectable 4 milliGRAM(s) IV Push every 6 hours PRN  traMADol 25 milliGRAM(s) Oral every 6 hours PRN  traMADol 50 milliGRAM(s) Oral every 6 hours PRN    Anticoagulation:    OTHER:  chlorhexidine 2% Cloths 1 Application(s) Topical <User Schedule>  dexAMETHasone     Tablet 6 milliGRAM(s) Oral every 6 hours  dexAMETHasone     Tablet   Oral   influenza   Vaccine 0.5 milliLiter(s) IntraMuscular once  pantoprazole    Tablet 40 milliGRAM(s) Oral before breakfast  senna 2 Tablet(s) Oral at bedtime    IVF:  sodium chloride 0.9%. 1000 milliLiter(s) IV Continuous <Continuous>    CULTURES:    RADIOLOGY & ADDITIONAL TESTS:      ASSESSMENT:  43 y/o F with PMH chiari malformation presents to ED c/o persistent worsened headache and neck pain x 2 days. Now s/p SOC, C1 laminectomy and duroplasty for chiari decompression (12/14/23) .     HEADACHE    No pertinent family history in first degree relatives    Handoff    MEWS Score    No pertinent past medical history    Arachnoid cyst    Chiari I malformation    Headache    Arachnoid cyst    Craniectomy, for Chiari malformation treatment    H/O breast surgery    NECK PAIN    90+    SysAdmin_VisitLink        PLAN:  Plan:  Neuro:  - neuro/vital checks q1  - decadron 6q6 taper over 1 week to off  - pain control prn, tramadol 25/50 prn, valium 5q8 x2days  - MRI brain w/ CSF flow and MRI cervical spine 12/12: Chiari 1 malformation, no CSF flow obstruction  - CTH and CT cervical spine completed  - Cervical collar at all times except when showering    Cardiac:  - SBP goal 100-140    Pulmonary:  - on RA    GI:  - ADAT  - bowel regimen  - pantoprazole for hx gastritis and while on steroids    Renal:  - IVF while ADAT  - +salas, remove in AM    Heme:  - SCDs DVT ppx  - SQL held as per Dr Price due to high risk of bleeding    Endo:  - no issues    ID:  - afebrile    Disposition: Regional status, full code, dispo pending OR 12/14    D/w Dr. Price    DVT PROPHYLAXIS:  [x] Venodynes                                [x] Heparin/Lovenox    FALL RISK:  [] Low Risk                                    [] Impulsive      Assessment:  Present when checked    []  GCS  E   V  M     Heart Failure: []Acute, [] acute on chronic , []chronic  Heart Failure:  [] Diastolic (HFpEF), [] Systolic (HFrEF), []Combined (HFpEF and HFrEF), [] RHF, [] Pulm HTN, [] Other    [] KANWAL, [] ATN, [] AIN, [] other  [] CKD1, [] CKD2, [] CKD 3, [] CKD 4, [] CKD 5, []ESRD    Encephalopathy: [] Metabolic, [] Hepatic, [] toxic, [] Neurological, [] Other    Abnormal Nurtitional Status: [] malnurtition (see nutrition note), [ ]underweight: BMI < 19, [] morbid obesity: BMI >40, [] Cachexia    [] Sepsis  [] hypovolemic shock,[] cardiogenic shock, [] hemorrhagic shock, [] neuogenic shock  [] Acute Respiratory Failure  []Cerebral edema, [] Brain compression/ herniation,   [] Functional quadriplegia  [] Acute blood loss anemia

## 2023-12-15 NOTE — DISCHARGE NOTE PROVIDER - NSDCCPCAREPLAN_GEN_ALL_CORE_FT
PRINCIPAL DISCHARGE DIAGNOSIS  Diagnosis: Chiari I malformation  Assessment and Plan of Treatment:

## 2023-12-15 NOTE — DISCHARGE NOTE PROVIDER - HOSPITAL COURSE
HPI:  43 y/o F with pmh chiari malformation presents to ED c/o persistent worsened headache and neck pain x 2 days. Patient reports that she has experienced headaches intermittently for more than 10 years related to her chiari malformation. The pain typically is elicited with quick head movements or laughing hard but usually resolves. Patient reports that she also has some mild dizziness associated with her recent headache. She has taken tylenol and motrin over the counter for pain without relief. Patient denies nausea, vomiting, vision changes, extremity numbness or weakness, speech changes and confusion. Patient was last seen in office 11/28 and was scheduled for an MRI CSF flow study and MRI cervical spine on 12/14.     Hospital Course:  12/11: adm, CTH/CT C spine complete  12/12: JOSAFAT overnight. MRI c-spine / MR brain w/ CSF flow pending, robaxin helping w/ neck stiffness, has medical clearance for chiari decompression Thursday 12/14. MRI read Chiari I malformation w tonsillar herniation.   12/13: Preop for 12/14. Consent obtained and signed in chart. Pending EKG and CXR final read.  12/14: Plan for OR today for chiari decompression. POD0 SOC, C1 laminectomy and duroplasty for chiari decompression ,   12/15: POD 1 SOC, C1 laminectomy and duroplasty for chiari decompression, JOSAFAT ovn  12/16: POD2, JOSAFAT overnight    Patient evaluated by PT/OT who recommended: no needs  Patient is going home.    Hospital course uncomplicated.    Exam on day of discharge:  General: patient seen laying supine in bed in NAD  Neuro: AAOx3, follows commands, opens eyes spontaneously, speech clear and fluent,  face symmetric,  strength 5/5 b/l upper extremities and lower extremities, sensation intact to light touch throughout  HEENT: PERRL  Neck: supple, +[place] J collar  Cardiac: RRR, S1S2  Pulmonary: chest rise symmetric  Abdomen: soft, nontender, nondistended  Ext: perfusing well  Skin: warm, dry  Wound: posterior cervical incision dressing c/d/i    Checklist:   - Obtained follow up appointment from NP  - Reviewed final recommendations of inpatient consults  - review discharge planning on provider handoff  - post op imaging completed  - Neurologically stable for discharge  - Vitals stable for discharge   - Afebrile for discharge  - WBC is stable  - Sodium level is normal  - Pain is adequately controlled  - Pt has PICC/walker/brace/collar   - LACE score (10 or > needs PCP apt)   - stroke patient? Discharge NIHSS score   HPI:  41 y/o F with pmh chiari malformation presents to ED c/o persistent worsened headache and neck pain x 2 days. Patient reports that she has experienced headaches intermittently for more than 10 years related to her chiari malformation. The pain typically is elicited with quick head movements or laughing hard but usually resolves. Patient reports that she also has some mild dizziness associated with her recent headache. She has taken tylenol and motrin over the counter for pain without relief. Patient denies nausea, vomiting, vision changes, extremity numbness or weakness, speech changes and confusion. Patient was last seen in office 11/28 and was scheduled for an MRI CSF flow study and MRI cervical spine on 12/14.     Hospital Course:  12/11: adm, CTH/CT C spine complete  12/12: JOSAFAT overnight. MRI c-spine / MR brain w/ CSF flow pending, robaxin helping w/ neck stiffness, has medical clearance for chiari decompression Thursday 12/14. MRI read Chiari I malformation w tonsillar herniation.   12/13: Preop for 12/14. Consent obtained and signed in chart. Pending EKG and CXR final read.  12/14: Plan for OR today for chiari decompression. POD0 SOC, C1 laminectomy and duroplasty for chiari decompression ,   12/15: POD 1 SOC, C1 laminectomy and duroplasty for chiari decompression, JOSAFAT ovn  12/16: POD2, JOSAFAT overnight  12/17: POD 3. JOSAFAT overnight. Plan for home today.     Patient evaluated by PT/OT who recommended: no needs  Patient is going home.    Hospital course uncomplicated.    Exam on day of discharge:  General: patient seen laying supine in bed in NAD  Neuro: AAOx3, follows commands, opens eyes spontaneously, speech clear and fluent,  face symmetric,  strength 5/5 b/l upper extremities and lower extremities, sensation intact to light touch throughout  HEENT: PERRL  Neck: supple, +[place] J collar  Cardiac: RRR, S1S2  Pulmonary: chest rise symmetric  Abdomen: soft, nontender, nondistended  Ext: perfusing well  Skin: warm, dry  Wound: posterior cervical incision dressing c/d/i    Patient has been neurologically and hemodynamically stable and is medically ready for discharge.     Checklist:   - Obtained follow up appointment from NP  - Reviewed final recommendations of inpatient consults  - review discharge planning on provider handoff  - post op imaging completed  - Neurologically stable for discharge  - Vitals stable for discharge   - Afebrile for discharge  - WBC is stable  - Sodium level is normal  - Pain is adequately controlled  - Pt has PICC/walker/brace/collar    HPI:  43 y/o F with pmh chiari malformation presents to ED c/o persistent worsened headache and neck pain x 2 days. Patient reports that she has experienced headaches intermittently for more than 10 years related to her chiari malformation. The pain typically is elicited with quick head movements or laughing hard but usually resolves. Patient reports that she also has some mild dizziness associated with her recent headache. She has taken tylenol and motrin over the counter for pain without relief. Patient denies nausea, vomiting, vision changes, extremity numbness or weakness, speech changes and confusion. Patient was last seen in office 11/28 and was scheduled for an MRI CSF flow study and MRI cervical spine on 12/14.     Hospital Course:  12/11: adm, CTH/CT C spine complete  12/12: JOSAFAT overnight. MRI c-spine / MR brain w/ CSF flow pending, robaxin helping w/ neck stiffness, has medical clearance for chiari decompression Thursday 12/14. MRI read Chiari I malformation w tonsillar herniation.   12/13: Preop for 12/14. Consent obtained and signed in chart. Pending EKG and CXR final read.  12/14: Plan for OR today for chiari decompression. POD0 SOC, C1 laminectomy and duroplasty for chiari decompression ,   12/15: POD 1 SOC, C1 laminectomy and duroplasty for chiari decompression, JOSAFAT ovn  12/16: POD2, JOSAFAT overnight  12/17: POD 3. JOSAFAT overnight. Plan for home today.     Patient evaluated by PT/OT who recommended: no needs  Patient is going home.    Hospital course uncomplicated.    Exam on day of discharge:  General: patient seen laying supine in bed in NAD  Neuro: AAOx3, follows commands, opens eyes spontaneously, speech clear and fluent,  face symmetric,  strength 5/5 b/l upper extremities and lower extremities, sensation intact to light touch throughout  HEENT: PERRL  Neck: supple, +[place] J collar  Cardiac: RRR, S1S2  Pulmonary: chest rise symmetric  Abdomen: soft, nontender, nondistended  Ext: perfusing well  Skin: warm, dry  Wound: posterior cervical incision dressing c/d/i    Patient has been neurologically and hemodynamically stable and is medically ready for discharge.     Checklist:   - Obtained follow up appointment from NP  - Reviewed final recommendations of inpatient consults  - review discharge planning on provider handoff  - post op imaging completed  - Neurologically stable for discharge  - Vitals stable for discharge   - Afebrile for discharge  - WBC is stable  - Sodium level is normal  - Pain is adequately controlled  - Pt has PICC/walker/brace/collar    HPI:  41 y/o F with pmh chiari malformation presents to ED c/o persistent worsened headache and neck pain x 2 days. Patient reports that she has experienced headaches intermittently for more than 10 years related to her chiari malformation. The pain typically is elicited with quick head movements or laughing hard but usually resolves. Patient reports that she also has some mild dizziness associated with her recent headache. She has taken tylenol and motrin over the counter for pain without relief. Patient denies nausea, vomiting, vision changes, extremity numbness or weakness, speech changes and confusion. Patient was last seen in office 11/28 and was scheduled for an MRI CSF flow study and MRI cervical spine on 12/14.     Hospital Course:  12/11: adm, CTH/CT C spine complete  12/12: JOSAFAT overnight. MRI c-spine / MR brain w/ CSF flow pending, robaxin helping w/ neck stiffness, has medical clearance for chiari decompression Thursday 12/14. MRI read Chiari I malformation w tonsillar herniation.   12/13: Preop for 12/14. Consent obtained and signed in chart. Pending EKG and CXR final read.  12/14: Plan for OR today for chiari decompression. POD0 SOC, C1 laminectomy and duroplasty for chiari decompression ,   12/15: POD 1 SOC, C1 laminectomy and duroplasty for chiari decompression, JOSAFAT ovn  12/16: POD2, JOSAFAT overnight  12/17: POD 3. JOSAFAT overnight. Plan for home today.     Patient evaluated by PT/OT who recommended: no needs  Patient is going home.    Hospital course uncomplicated.    Exam on day of discharge:  General: patient seen laying supine in bed in NAD  Neuro: AAOx3, follows commands, opens eyes spontaneously, speech clear and fluent,  face symmetric,  strength 5/5 b/l upper extremities and lower extremities, sensation intact to light touch throughout  HEENT: PERRL  Neck: supple, +[place] J collar  Cardiac: RRR, S1S2  Pulmonary: chest rise symmetric  Abdomen: soft, nontender, nondistended  Ext: perfusing well  Skin: warm, dry  Wound: posterior cervical incision dressing c/d/i    Patient has been neurologically and hemodynamically stable and is medically ready for discharge.     Patient neurologically stable for discharge. Labs stable, vitals stable.    HPI:  43 y/o F with pmh chiari malformation presents to ED c/o persistent worsened headache and neck pain x 2 days. Patient reports that she has experienced headaches intermittently for more than 10 years related to her chiari malformation. The pain typically is elicited with quick head movements or laughing hard but usually resolves. Patient reports that she also has some mild dizziness associated with her recent headache. She has taken tylenol and motrin over the counter for pain without relief. Patient denies nausea, vomiting, vision changes, extremity numbness or weakness, speech changes and confusion. Patient was last seen in office 11/28 and was scheduled for an MRI CSF flow study and MRI cervical spine on 12/14.     Hospital Course:  12/11: adm, CTH/CT C spine complete  12/12: JOSAFAT overnight. MRI c-spine / MR brain w/ CSF flow pending, robaxin helping w/ neck stiffness, has medical clearance for chiari decompression Thursday 12/14. MRI read Chiari I malformation w tonsillar herniation.   12/13: Preop for 12/14. Consent obtained and signed in chart. Pending EKG and CXR final read.  12/14: Plan for OR today for chiari decompression. POD0 SOC, C1 laminectomy and duroplasty for chiari decompression ,   12/15: POD 1 SOC, C1 laminectomy and duroplasty for chiari decompression, JOSAFAT ovn  12/16: POD2, JOSAFAT overnight  12/17: POD 3. JOSAFAT overnight. Plan for home today.     Patient evaluated by PT/OT who recommended: no needs  Patient is going home.    Hospital course uncomplicated.    Exam on day of discharge:  General: patient seen laying supine in bed in NAD  Neuro: AAOx3, follows commands, opens eyes spontaneously, speech clear and fluent,  face symmetric,  strength 5/5 b/l upper extremities and lower extremities, sensation intact to light touch throughout  HEENT: PERRL  Neck: supple, +[place] J collar  Cardiac: RRR, S1S2  Pulmonary: chest rise symmetric  Abdomen: soft, nontender, nondistended  Ext: perfusing well  Skin: warm, dry  Wound: posterior cervical incision dressing c/d/i    Patient has been neurologically and hemodynamically stable and is medically ready for discharge.     Patient neurologically stable for discharge. Labs stable, vitals stable.

## 2023-12-16 LAB
ANION GAP SERPL CALC-SCNC: 9 MMOL/L — SIGNIFICANT CHANGE UP (ref 5–17)
ANION GAP SERPL CALC-SCNC: 9 MMOL/L — SIGNIFICANT CHANGE UP (ref 5–17)
BUN SERPL-MCNC: 14 MG/DL — SIGNIFICANT CHANGE UP (ref 7–23)
BUN SERPL-MCNC: 14 MG/DL — SIGNIFICANT CHANGE UP (ref 7–23)
CALCIUM SERPL-MCNC: 9.4 MG/DL — SIGNIFICANT CHANGE UP (ref 8.4–10.5)
CALCIUM SERPL-MCNC: 9.4 MG/DL — SIGNIFICANT CHANGE UP (ref 8.4–10.5)
CHLORIDE SERPL-SCNC: 104 MMOL/L — SIGNIFICANT CHANGE UP (ref 96–108)
CHLORIDE SERPL-SCNC: 104 MMOL/L — SIGNIFICANT CHANGE UP (ref 96–108)
CO2 SERPL-SCNC: 26 MMOL/L — SIGNIFICANT CHANGE UP (ref 22–31)
CO2 SERPL-SCNC: 26 MMOL/L — SIGNIFICANT CHANGE UP (ref 22–31)
CREAT SERPL-MCNC: 0.47 MG/DL — LOW (ref 0.5–1.3)
CREAT SERPL-MCNC: 0.47 MG/DL — LOW (ref 0.5–1.3)
EGFR: 123 ML/MIN/1.73M2 — SIGNIFICANT CHANGE UP
EGFR: 123 ML/MIN/1.73M2 — SIGNIFICANT CHANGE UP
GLUCOSE SERPL-MCNC: 156 MG/DL — HIGH (ref 70–99)
GLUCOSE SERPL-MCNC: 156 MG/DL — HIGH (ref 70–99)
HCT VFR BLD CALC: 37 % — SIGNIFICANT CHANGE UP (ref 34.5–45)
HCT VFR BLD CALC: 37 % — SIGNIFICANT CHANGE UP (ref 34.5–45)
HGB BLD-MCNC: 12.4 G/DL — SIGNIFICANT CHANGE UP (ref 11.5–15.5)
HGB BLD-MCNC: 12.4 G/DL — SIGNIFICANT CHANGE UP (ref 11.5–15.5)
MAGNESIUM SERPL-MCNC: 2.2 MG/DL — SIGNIFICANT CHANGE UP (ref 1.6–2.6)
MAGNESIUM SERPL-MCNC: 2.2 MG/DL — SIGNIFICANT CHANGE UP (ref 1.6–2.6)
MCHC RBC-ENTMCNC: 29.3 PG — SIGNIFICANT CHANGE UP (ref 27–34)
MCHC RBC-ENTMCNC: 29.3 PG — SIGNIFICANT CHANGE UP (ref 27–34)
MCHC RBC-ENTMCNC: 33.5 GM/DL — SIGNIFICANT CHANGE UP (ref 32–36)
MCHC RBC-ENTMCNC: 33.5 GM/DL — SIGNIFICANT CHANGE UP (ref 32–36)
MCV RBC AUTO: 87.5 FL — SIGNIFICANT CHANGE UP (ref 80–100)
MCV RBC AUTO: 87.5 FL — SIGNIFICANT CHANGE UP (ref 80–100)
NRBC # BLD: 0 /100 WBCS — SIGNIFICANT CHANGE UP (ref 0–0)
NRBC # BLD: 0 /100 WBCS — SIGNIFICANT CHANGE UP (ref 0–0)
PHOSPHATE SERPL-MCNC: 3 MG/DL — SIGNIFICANT CHANGE UP (ref 2.5–4.5)
PHOSPHATE SERPL-MCNC: 3 MG/DL — SIGNIFICANT CHANGE UP (ref 2.5–4.5)
PLATELET # BLD AUTO: 273 K/UL — SIGNIFICANT CHANGE UP (ref 150–400)
PLATELET # BLD AUTO: 273 K/UL — SIGNIFICANT CHANGE UP (ref 150–400)
POTASSIUM SERPL-MCNC: 4.4 MMOL/L — SIGNIFICANT CHANGE UP (ref 3.5–5.3)
POTASSIUM SERPL-MCNC: 4.4 MMOL/L — SIGNIFICANT CHANGE UP (ref 3.5–5.3)
POTASSIUM SERPL-SCNC: 4.4 MMOL/L — SIGNIFICANT CHANGE UP (ref 3.5–5.3)
POTASSIUM SERPL-SCNC: 4.4 MMOL/L — SIGNIFICANT CHANGE UP (ref 3.5–5.3)
RBC # BLD: 4.23 M/UL — SIGNIFICANT CHANGE UP (ref 3.8–5.2)
RBC # BLD: 4.23 M/UL — SIGNIFICANT CHANGE UP (ref 3.8–5.2)
RBC # FLD: 13.1 % — SIGNIFICANT CHANGE UP (ref 10.3–14.5)
RBC # FLD: 13.1 % — SIGNIFICANT CHANGE UP (ref 10.3–14.5)
SODIUM SERPL-SCNC: 139 MMOL/L — SIGNIFICANT CHANGE UP (ref 135–145)
SODIUM SERPL-SCNC: 139 MMOL/L — SIGNIFICANT CHANGE UP (ref 135–145)
WBC # BLD: 14.93 K/UL — HIGH (ref 3.8–10.5)
WBC # BLD: 14.93 K/UL — HIGH (ref 3.8–10.5)
WBC # FLD AUTO: 14.93 K/UL — HIGH (ref 3.8–10.5)
WBC # FLD AUTO: 14.93 K/UL — HIGH (ref 3.8–10.5)

## 2023-12-16 PROCEDURE — 99024 POSTOP FOLLOW-UP VISIT: CPT

## 2023-12-16 PROCEDURE — 99232 SBSQ HOSP IP/OBS MODERATE 35: CPT

## 2023-12-16 RX ORDER — DIAZEPAM 5 MG
5 TABLET ORAL EVERY 8 HOURS
Refills: 0 | Status: DISCONTINUED | OUTPATIENT
Start: 2023-12-16 | End: 2023-12-17

## 2023-12-16 RX ADMIN — Medication 1000 MILLIGRAM(S): at 05:56

## 2023-12-16 RX ADMIN — Medication 5 MILLIGRAM(S): at 05:56

## 2023-12-16 RX ADMIN — Medication 4 MILLIGRAM(S): at 12:00

## 2023-12-16 RX ADMIN — TRAMADOL HYDROCHLORIDE 50 MILLIGRAM(S): 50 TABLET ORAL at 18:42

## 2023-12-16 RX ADMIN — Medication 4 MILLIGRAM(S): at 23:08

## 2023-12-16 RX ADMIN — PANTOPRAZOLE SODIUM 40 MILLIGRAM(S): 20 TABLET, DELAYED RELEASE ORAL at 06:53

## 2023-12-16 RX ADMIN — TRAMADOL HYDROCHLORIDE 50 MILLIGRAM(S): 50 TABLET ORAL at 17:42

## 2023-12-16 RX ADMIN — Medication 4 MILLIGRAM(S): at 00:13

## 2023-12-16 RX ADMIN — Medication 4 MILLIGRAM(S): at 18:00

## 2023-12-16 RX ADMIN — SENNA PLUS 2 TABLET(S): 8.6 TABLET ORAL at 22:03

## 2023-12-16 RX ADMIN — Medication 1000 MILLIGRAM(S): at 22:03

## 2023-12-16 RX ADMIN — TRAMADOL HYDROCHLORIDE 50 MILLIGRAM(S): 50 TABLET ORAL at 00:13

## 2023-12-16 RX ADMIN — TRAMADOL HYDROCHLORIDE 50 MILLIGRAM(S): 50 TABLET ORAL at 07:52

## 2023-12-16 RX ADMIN — Medication 1000 MILLIGRAM(S): at 14:00

## 2023-12-16 RX ADMIN — Medication 1000 MILLIGRAM(S): at 06:56

## 2023-12-16 RX ADMIN — TRAMADOL HYDROCHLORIDE 50 MILLIGRAM(S): 50 TABLET ORAL at 01:00

## 2023-12-16 RX ADMIN — TRAMADOL HYDROCHLORIDE 50 MILLIGRAM(S): 50 TABLET ORAL at 06:52

## 2023-12-16 RX ADMIN — Medication 5 MILLIGRAM(S): at 14:00

## 2023-12-16 RX ADMIN — Medication 4 MILLIGRAM(S): at 05:56

## 2023-12-16 NOTE — PROGRESS NOTE ADULT - ASSESSMENT
41 y/o F with pmh chiari malformation presents to ED c/o persistent worsened headache and neck pain x 2 days. Admitted to [facility] for further workup.     Chiari Malformation  -now s/p C1 laminectomy and duroplasty 12/14 (chiari decompression)  -pain control per primary team  -pending dc home tomorrow    41 y/o F with pmh chiari malformation presents to ED c/o persistent worsened headache and neck pain x 2 days. Admitted to [facility] for further workup.     Chiari Malformation  -now s/p C1 laminectomy and duroplasty 12/14 (chiari decompression)  -pain control per primary team  -pending dc home tomorrow

## 2023-12-16 NOTE — PROGRESS NOTE ADULT - SUBJECTIVE AND OBJECTIVE BOX
INTERVAL EVENTS: no acute events or complaints    PAST MEDICAL & SURGICAL HISTORY:  No pertinent past medical history    Arachnoid cyst    H/O breast surgery        MEDICATIONS  (STANDING):  acetaminophen     Tablet .. 1000 milliGRAM(s) Oral every 8 hours  dexAMETHasone     Tablet   Oral   dexAMETHasone     Tablet 4 milliGRAM(s) Oral every 6 hours  diazepam    Tablet 5 milliGRAM(s) Oral every 8 hours  influenza   Vaccine 0.5 milliLiter(s) IntraMuscular once  pantoprazole    Tablet 40 milliGRAM(s) Oral before breakfast  senna 2 Tablet(s) Oral at bedtime    MEDICATIONS  (PRN):  ondansetron Injectable 4 milliGRAM(s) IV Push every 6 hours PRN Nausea and/or Vomiting  traMADol 25 milliGRAM(s) Oral every 6 hours PRN Moderate Pain (4 - 6)  traMADol 50 milliGRAM(s) Oral every 6 hours PRN Severe Pain (7 - 10)    T(F): 98.4 (12-16-23 @ 09:18), Max: 98.7 (12-15-23 @ 17:00)  HR: 84 (12-16-23 @ 09:18) (77 - 84)  BP: 132/79 (12-16-23 @ 09:18) (129/77 - 144/84)  BP(mean): --  ABP: --  ABP(mean): --  RR: 16 (12-16-23 @ 09:18) (16 - 18)  SpO2: 97% (12-16-23 @ 09:18) (96% - 98%)    I/O Detail 24H    15 Dec 2023 07:01  -  16 Dec 2023 07:00  --------------------------------------------------------  IN:    Oral Fluid: 300 mL    sodium chloride 0.9%: 95 mL  Total IN: 395 mL    OUT:    Indwelling Catheter - Urethral (mL): 375 mL    Voided (mL): 450 mL  Total OUT: 825 mL    Total NET: -430 mL          PHYSICAL EXAM:  General: patient seen laying supine in bed in NAD  Neuro: AAOx3, follows commands, opens eyes spontaneously, speech clear and fluent,  face symmetric,  strength 5/5 b/l upper extremities and lower extremities, sensation intact to light touch throughout  HEENT: PERRL  Neck: supple, +[place] J collar  Cardiac: RRR, S1S2  Pulmonary: chest rise symmetric  Abdomen: soft, nontender, nondistended  Ext: perfusing well  Skin: warm, dry  Wound: posterior cervical incision dressing c/d/i    LABS:  CBC 12-16-23 @ 06:06                        12.4   14.93 )-----------( 273                   37.0       Hgb trend: 12.4 <-- , 12.5 <-- , 13.0 <-- , 12.7 <--   WBC trend: 14.93 <-- , 9.68 <-- , 6.01 <-- , 4.93 <--       CMP 12-16-23 @ 06:06    139  |  104  |  14  ----------------------------<  156<H>  4.4   |  26  |  0.47<L>    Ca    9.4      12-16-23 @ 06:06  Phos  3.0     12-16  Mg     2.2     12-16        Serum Cr trend: 0.47 <-- , 0.48 <-- , 0.51 <-- , 0.53 <--     PT/INR - ( 14 Dec 2023 20:47 )   PT: 12.2 sec;   INR: 1.07          PTT - ( 14 Dec 2023 20:47 ):109.4 sec    Cardiac Markers           STUDIES:

## 2023-12-16 NOTE — PROGRESS NOTE ADULT - SUBJECTIVE AND OBJECTIVE BOX
HPI:  43 y/o F with pmh chiari malformation presents to ED c/o persistent worsened headache and neck pain x 2 days. Patient reports that she has experienced headaches intermittently for more than 10 years related to her chiari malformation. The pain typically is elicited with quick head movements or laughing hard but usually resolves. Patient reports that she also has some mild dizziness associated with her recent headache. She has taken tylenol and motrin over the counter for pain without relief. Patient denies nausea, vomiting, vision changes, extremity numbness or weakness, speech changes and confusion. (11 Dec 2023 18:40)      Subjective:  Patient admits to posterior cervical pain improved when taking her pain medications.     Hospital course:  12/11: adm, CTH/CT C spine complete  12/12: JOSAFAT overnight. MRI c-spine / MR brain w/ CSF flow pending, robaxin helping w/ neck stiffness, has medical clearance for chiari decompression Thursday 12/14. MRI read Chiari I malformation w tonsillar herniation.   12/13: Preop for 12/14. Consent obtained and signed in chart. Pending EKG and CXR final read.  12/14: Plan for OR today for chiari decompression. POD0 SOC, C1 laminectomy and duroplasty for chiari decompression ,   12/15: POD 1 SOC, C1 laminectomy and duroplasty for chiari decompression, JOSAFAT ovn, PT/OT rec no needs  12/16: POD2, JOSAFAT overnight    Vital Signs Last 24 Hrs  T(C): 36.7 (15 Dec 2023 21:09), Max: 37.2 (15 Dec 2023 09:13)  T(F): 98 (15 Dec 2023 21:09), Max: 98.9 (15 Dec 2023 09:13)  HR: 77 (15 Dec 2023 21:09) (77 - 109)  BP: 144/84 (15 Dec 2023 21:09) (125/65 - 144/84)  BP(mean): 97 (15 Dec 2023 11:08) (88 - 110)  RR: 18 (15 Dec 2023 21:09) (15 - 20)  SpO2: 96% (15 Dec 2023 21:09) (95% - 98%)    Parameters below as of 15 Dec 2023 21:09  Patient On (Oxygen Delivery Method): room air        I&O's Summary    14 Dec 2023 07:01  -  15 Dec 2023 07:00  --------------------------------------------------------  IN: 1195 mL / OUT: 960 mL / NET: 235 mL    15 Dec 2023 07:01  -  16 Dec 2023 00:23  --------------------------------------------------------  IN: 395 mL / OUT: 825 mL / NET: -430 mL        PHYSICAL EXAM:  General: patient seen laying supine in bed in NAD  Neuro: AAOx3, follows commands, opens eyes spontaneously, speech clear and fluent,  face symmetric,  strength 5/5 b/l upper extremities and lower extremities, sensation intact to light touch throughout  HEENT: PERRL  Neck: supple, +[place] J collar  Cardiac: RRR, S1S2  Pulmonary: chest rise symmetric  Abdomen: soft, nontender, nondistended  Ext: perfusing well  Skin: warm, dry  Wound: posterior cervical incision dressing c/d/i          DIET:  [] NPO  [x] Mechanical  [] Tube feeds    LABS:                        12.5   9.68  )-----------( 279      ( 15 Dec 2023 04:31 )             36.2     12-15    138  |  105  |  10  ----------------------------<  155<H>  4.3   |  23  |  0.48<L>    Ca    9.0      15 Dec 2023 04:31  Phos  2.9     12-15  Mg     1.8     12-15      PT/INR - ( 14 Dec 2023 20:47 )   PT: 12.2 sec;   INR: 1.07          PTT - ( 14 Dec 2023 20:47 )  PTT:109.4 sec  Urinalysis Basic - ( 15 Dec 2023 04:31 )    Color: x / Appearance: x / SG: x / pH: x  Gluc: 155 mg/dL / Ketone: x  / Bili: x / Urobili: x   Blood: x / Protein: x / Nitrite: x   Leuk Esterase: x / RBC: x / WBC x   Sq Epi: x / Non Sq Epi: x / Bacteria: x          CAPILLARY BLOOD GLUCOSE          Drug Levels: [] N/A    CSF Analysis: [] N/A      Allergies    No Known Allergies    Intolerances      MEDICATIONS:  Antibiotics:    Neuro:  acetaminophen     Tablet .. 1000 milliGRAM(s) Oral every 8 hours  diazepam    Tablet 5 milliGRAM(s) Oral every 8 hours  ondansetron Injectable 4 milliGRAM(s) IV Push every 6 hours PRN  traMADol 25 milliGRAM(s) Oral every 6 hours PRN  traMADol 50 milliGRAM(s) Oral every 6 hours PRN    Anticoagulation:    OTHER:  dexAMETHasone     Tablet   Oral   dexAMETHasone     Tablet 4 milliGRAM(s) Oral every 6 hours  influenza   Vaccine 0.5 milliLiter(s) IntraMuscular once  pantoprazole    Tablet 40 milliGRAM(s) Oral before breakfast  senna 2 Tablet(s) Oral at bedtime    IVF:    CULTURES:    RADIOLOGY & ADDITIONAL TESTS:    HEADACHE    No pertinent family history in first degree relatives    Handoff    MEWS Score    No pertinent past medical history    Arachnoid cyst    Chiari I malformation    Craniectomy, for Chiari malformation treatment    Headache    Chiari I malformation    Arachnoid cyst    Craniectomy, for Chiari malformation treatment    H/O breast surgery    NECK PAIN    90+    SysAdmin_VisitLink        ASSESSMENT:  43 y/o F with PMH chiari malformation presents to ED c/o persistent worsened headache and neck pain x 2 days. Now s/p SOC, C1 laminectomy and duroplasty for chiari decompression (12/14/23) .     Plan:  Neuro:  - neuro/vital checks q1  - decadron 6q6 taper over 1 week to off  - pain control prn, tramadol 25/50 prn, valium 5q8 x2days (12/14-12/16)  - no postop imaging needed  - [place] J collar at all times except when showering    Cardiac:  - SBP goal 100-140    Pulmonary:  - on RA    GI:  - Regular diet  - Bowel regimen  - pantoprazole for hx gastritis and while on steroids    Renal:  - IVL  - voiding    Heme:  - SCDs DVT ppx  - holding SQL prophylaxis at this time due to high risk of bleeding    Endo:  - no issues    ID:  - afebrile    Disposition: Regional status, full code, PT/OT rec no needs     D/w Dr. Price      Assessment:  Present when checked    []  GCS  E   V  M     Heart Failure: []Acute, [] acute on chronic , []chronic  Heart Failure:  [] Diastolic (HFpEF), [] Systolic (HFrEF), []Combined (HFpEF and HFrEF), [] RHF, [] Pulm HTN, [] Other    [] KANWAL, [] ATN, [] AIN, [] other  [] CKD1, [] CKD2, [] CKD 3, [] CKD 4, [] CKD 5, []ESRD    Encephalopathy: [] Metabolic, [] Hepatic, [] toxic, [] Neurological, [] Other    Abnormal Nurtitional Status: [] malnurtition (see nutrition note), [ ]underweight: BMI < 19, [] morbid obesity: BMI >40, [] Cachexia    [] Sepsis  [] hypovolemic shock,[] cardiogenic shock, [] hemorrhagic shock, [] neuogenic shock  [] Acute Respiratory Failure  []Cerebral edema, [] Brain compression/ herniation,   [] Functional quadriplegia  [] Acute blood loss anemia   HPI:  43 y/o F with pmh chiari malformation presents to ED c/o persistent worsened headache and neck pain x 2 days. Patient reports that she has experienced headaches intermittently for more than 10 years related to her chiari malformation. The pain typically is elicited with quick head movements or laughing hard but usually resolves. Patient reports that she also has some mild dizziness associated with her recent headache. She has taken tylenol and motrin over the counter for pain without relief. Patient denies nausea, vomiting, vision changes, extremity numbness or weakness, speech changes and confusion. (11 Dec 2023 18:40)      Subjective:  Patient admits to posterior cervical pain improved when taking her pain medications.     Hospital course:  12/11: adm, CTH/CT C spine complete  12/12: JOSAFAT overnight. MRI c-spine / MR brain w/ CSF flow pending, robaxin helping w/ neck stiffness, has medical clearance for chiari decompression Thursday 12/14. MRI read Chiari I malformation w tonsillar herniation.   12/13: Preop for 12/14. Consent obtained and signed in chart. Pending EKG and CXR final read.  12/14: Plan for OR today for chiari decompression. POD0 SOC, C1 laminectomy and duroplasty for chiari decompression ,   12/15: POD 1 SOC, C1 laminectomy and duroplasty for chiari decompression, JOSAFAT ovn, PT/OT rec no needs  12/16: POD2, JOSAFAT overnight    Vital Signs Last 24 Hrs  T(C): 36.7 (15 Dec 2023 21:09), Max: 37.2 (15 Dec 2023 09:13)  T(F): 98 (15 Dec 2023 21:09), Max: 98.9 (15 Dec 2023 09:13)  HR: 77 (15 Dec 2023 21:09) (77 - 109)  BP: 144/84 (15 Dec 2023 21:09) (125/65 - 144/84)  BP(mean): 97 (15 Dec 2023 11:08) (88 - 110)  RR: 18 (15 Dec 2023 21:09) (15 - 20)  SpO2: 96% (15 Dec 2023 21:09) (95% - 98%)    Parameters below as of 15 Dec 2023 21:09  Patient On (Oxygen Delivery Method): room air        I&O's Summary    14 Dec 2023 07:01  -  15 Dec 2023 07:00  --------------------------------------------------------  IN: 1195 mL / OUT: 960 mL / NET: 235 mL    15 Dec 2023 07:01  -  16 Dec 2023 00:23  --------------------------------------------------------  IN: 395 mL / OUT: 825 mL / NET: -430 mL        PHYSICAL EXAM:  General: patient seen laying supine in bed in NAD  Neuro: AAOx3, follows commands, opens eyes spontaneously, speech clear and fluent,  face symmetric,  strength 5/5 b/l upper extremities and lower extremities, sensation intact to light touch throughout  HEENT: PERRL  Neck: supple, +[place] J collar  Cardiac: RRR, S1S2  Pulmonary: chest rise symmetric  Abdomen: soft, nontender, nondistended  Ext: perfusing well  Skin: warm, dry  Wound: posterior cervical incision dressing c/d/i          DIET:  [] NPO  [x] Mechanical  [] Tube feeds    LABS:                        12.5   9.68  )-----------( 279      ( 15 Dec 2023 04:31 )             36.2     12-15    138  |  105  |  10  ----------------------------<  155<H>  4.3   |  23  |  0.48<L>    Ca    9.0      15 Dec 2023 04:31  Phos  2.9     12-15  Mg     1.8     12-15      PT/INR - ( 14 Dec 2023 20:47 )   PT: 12.2 sec;   INR: 1.07          PTT - ( 14 Dec 2023 20:47 )  PTT:109.4 sec  Urinalysis Basic - ( 15 Dec 2023 04:31 )    Color: x / Appearance: x / SG: x / pH: x  Gluc: 155 mg/dL / Ketone: x  / Bili: x / Urobili: x   Blood: x / Protein: x / Nitrite: x   Leuk Esterase: x / RBC: x / WBC x   Sq Epi: x / Non Sq Epi: x / Bacteria: x          CAPILLARY BLOOD GLUCOSE          Drug Levels: [] N/A    CSF Analysis: [] N/A      Allergies    No Known Allergies    Intolerances      MEDICATIONS:  Antibiotics:    Neuro:  acetaminophen     Tablet .. 1000 milliGRAM(s) Oral every 8 hours  diazepam    Tablet 5 milliGRAM(s) Oral every 8 hours  ondansetron Injectable 4 milliGRAM(s) IV Push every 6 hours PRN  traMADol 25 milliGRAM(s) Oral every 6 hours PRN  traMADol 50 milliGRAM(s) Oral every 6 hours PRN    Anticoagulation:    OTHER:  dexAMETHasone     Tablet   Oral   dexAMETHasone     Tablet 4 milliGRAM(s) Oral every 6 hours  influenza   Vaccine 0.5 milliLiter(s) IntraMuscular once  pantoprazole    Tablet 40 milliGRAM(s) Oral before breakfast  senna 2 Tablet(s) Oral at bedtime    IVF:    CULTURES:    RADIOLOGY & ADDITIONAL TESTS:    HEADACHE    No pertinent family history in first degree relatives    Handoff    MEWS Score    No pertinent past medical history    Arachnoid cyst    Chiari I malformation    Craniectomy, for Chiari malformation treatment    Headache    Chiari I malformation    Arachnoid cyst    Craniectomy, for Chiari malformation treatment    H/O breast surgery    NECK PAIN    90+    SysAdmin_VisitLink        ASSESSMENT:  43 y/o F with PMH chiari malformation presents to ED c/o persistent worsened headache and neck pain x 2 days. Now s/p SOC, C1 laminectomy and duroplasty for chiari decompression (12/14/23) .     Plan:  Neuro:  - neuro/vital checks q1  - decadron 6q6 taper over 1 week to off  - pain control prn, tramadol 25/50 prn, valium 5q8 x2days (12/14-12/16)  - no postop imaging needed  - [place] J collar at all times except when showering    Cardiac:  - SBP goal 100-140    Pulmonary:  - on RA    GI:  - Regular diet  - Bowel regimen  - pantoprazole for hx gastritis and while on steroids    Renal:  - IVL  - voiding    Heme:  - SCDs DVT ppx  - holding SQL prophylaxis at this time due to high risk of bleeding    Endo:  - no issues    ID:  - afebrile    Disposition: Regional status, full code, PT/OT rec no needs     D/w Dr. Price      Assessment:  Present when checked    []  GCS  E   V  M     Heart Failure: []Acute, [] acute on chronic , []chronic  Heart Failure:  [] Diastolic (HFpEF), [] Systolic (HFrEF), []Combined (HFpEF and HFrEF), [] RHF, [] Pulm HTN, [] Other    [] KANWAL, [] ATN, [] AIN, [] other  [] CKD1, [] CKD2, [] CKD 3, [] CKD 4, [] CKD 5, []ESRD    Encephalopathy: [] Metabolic, [] Hepatic, [] toxic, [] Neurological, [] Other    Abnormal Nurtitional Status: [] malnurtition (see nutrition note), [ ]underweight: BMI < 19, [] morbid obesity: BMI >40, [] Cachexia    [] Sepsis  [] hypovolemic shock,[] cardiogenic shock, [] hemorrhagic shock, [] neuogenic shock  [] Acute Respiratory Failure  []Cerebral edema, [] Brain compression/ herniation,   [] Functional quadriplegia  [] Acute blood loss anemia

## 2023-12-17 ENCOUNTER — TRANSCRIPTION ENCOUNTER (OUTPATIENT)
Age: 43
End: 2023-12-17

## 2023-12-17 VITALS
DIASTOLIC BLOOD PRESSURE: 69 MMHG | HEART RATE: 90 BPM | TEMPERATURE: 98 F | RESPIRATION RATE: 18 BRPM | SYSTOLIC BLOOD PRESSURE: 106 MMHG | OXYGEN SATURATION: 97 %

## 2023-12-17 LAB
ANION GAP SERPL CALC-SCNC: 10 MMOL/L — SIGNIFICANT CHANGE UP (ref 5–17)
ANION GAP SERPL CALC-SCNC: 10 MMOL/L — SIGNIFICANT CHANGE UP (ref 5–17)
BUN SERPL-MCNC: 16 MG/DL — SIGNIFICANT CHANGE UP (ref 7–23)
BUN SERPL-MCNC: 16 MG/DL — SIGNIFICANT CHANGE UP (ref 7–23)
CALCIUM SERPL-MCNC: 9.4 MG/DL — SIGNIFICANT CHANGE UP (ref 8.4–10.5)
CALCIUM SERPL-MCNC: 9.4 MG/DL — SIGNIFICANT CHANGE UP (ref 8.4–10.5)
CHLORIDE SERPL-SCNC: 104 MMOL/L — SIGNIFICANT CHANGE UP (ref 96–108)
CHLORIDE SERPL-SCNC: 104 MMOL/L — SIGNIFICANT CHANGE UP (ref 96–108)
CO2 SERPL-SCNC: 26 MMOL/L — SIGNIFICANT CHANGE UP (ref 22–31)
CO2 SERPL-SCNC: 26 MMOL/L — SIGNIFICANT CHANGE UP (ref 22–31)
CREAT SERPL-MCNC: 0.45 MG/DL — LOW (ref 0.5–1.3)
CREAT SERPL-MCNC: 0.45 MG/DL — LOW (ref 0.5–1.3)
EGFR: 125 ML/MIN/1.73M2 — SIGNIFICANT CHANGE UP
EGFR: 125 ML/MIN/1.73M2 — SIGNIFICANT CHANGE UP
GLUCOSE SERPL-MCNC: 143 MG/DL — HIGH (ref 70–99)
GLUCOSE SERPL-MCNC: 143 MG/DL — HIGH (ref 70–99)
HCT VFR BLD CALC: 36.4 % — SIGNIFICANT CHANGE UP (ref 34.5–45)
HCT VFR BLD CALC: 36.4 % — SIGNIFICANT CHANGE UP (ref 34.5–45)
HGB BLD-MCNC: 12.1 G/DL — SIGNIFICANT CHANGE UP (ref 11.5–15.5)
HGB BLD-MCNC: 12.1 G/DL — SIGNIFICANT CHANGE UP (ref 11.5–15.5)
MAGNESIUM SERPL-MCNC: 2.3 MG/DL — SIGNIFICANT CHANGE UP (ref 1.6–2.6)
MAGNESIUM SERPL-MCNC: 2.3 MG/DL — SIGNIFICANT CHANGE UP (ref 1.6–2.6)
MCHC RBC-ENTMCNC: 29.6 PG — SIGNIFICANT CHANGE UP (ref 27–34)
MCHC RBC-ENTMCNC: 29.6 PG — SIGNIFICANT CHANGE UP (ref 27–34)
MCHC RBC-ENTMCNC: 33.2 GM/DL — SIGNIFICANT CHANGE UP (ref 32–36)
MCHC RBC-ENTMCNC: 33.2 GM/DL — SIGNIFICANT CHANGE UP (ref 32–36)
MCV RBC AUTO: 89 FL — SIGNIFICANT CHANGE UP (ref 80–100)
MCV RBC AUTO: 89 FL — SIGNIFICANT CHANGE UP (ref 80–100)
NRBC # BLD: 0 /100 WBCS — SIGNIFICANT CHANGE UP (ref 0–0)
NRBC # BLD: 0 /100 WBCS — SIGNIFICANT CHANGE UP (ref 0–0)
PHOSPHATE SERPL-MCNC: 3.1 MG/DL — SIGNIFICANT CHANGE UP (ref 2.5–4.5)
PHOSPHATE SERPL-MCNC: 3.1 MG/DL — SIGNIFICANT CHANGE UP (ref 2.5–4.5)
PLATELET # BLD AUTO: 282 K/UL — SIGNIFICANT CHANGE UP (ref 150–400)
PLATELET # BLD AUTO: 282 K/UL — SIGNIFICANT CHANGE UP (ref 150–400)
POTASSIUM SERPL-MCNC: 4.1 MMOL/L — SIGNIFICANT CHANGE UP (ref 3.5–5.3)
POTASSIUM SERPL-MCNC: 4.1 MMOL/L — SIGNIFICANT CHANGE UP (ref 3.5–5.3)
POTASSIUM SERPL-SCNC: 4.1 MMOL/L — SIGNIFICANT CHANGE UP (ref 3.5–5.3)
POTASSIUM SERPL-SCNC: 4.1 MMOL/L — SIGNIFICANT CHANGE UP (ref 3.5–5.3)
RBC # BLD: 4.09 M/UL — SIGNIFICANT CHANGE UP (ref 3.8–5.2)
RBC # BLD: 4.09 M/UL — SIGNIFICANT CHANGE UP (ref 3.8–5.2)
RBC # FLD: 13.2 % — SIGNIFICANT CHANGE UP (ref 10.3–14.5)
RBC # FLD: 13.2 % — SIGNIFICANT CHANGE UP (ref 10.3–14.5)
SODIUM SERPL-SCNC: 140 MMOL/L — SIGNIFICANT CHANGE UP (ref 135–145)
SODIUM SERPL-SCNC: 140 MMOL/L — SIGNIFICANT CHANGE UP (ref 135–145)
WBC # BLD: 13.96 K/UL — HIGH (ref 3.8–10.5)
WBC # BLD: 13.96 K/UL — HIGH (ref 3.8–10.5)
WBC # FLD AUTO: 13.96 K/UL — HIGH (ref 3.8–10.5)
WBC # FLD AUTO: 13.96 K/UL — HIGH (ref 3.8–10.5)

## 2023-12-17 PROCEDURE — 71045 X-RAY EXAM CHEST 1 VIEW: CPT

## 2023-12-17 PROCEDURE — 70553 MRI BRAIN STEM W/O & W/DYE: CPT | Mod: MG

## 2023-12-17 PROCEDURE — 99232 SBSQ HOSP IP/OBS MODERATE 35: CPT

## 2023-12-17 PROCEDURE — C1713: CPT

## 2023-12-17 PROCEDURE — 82330 ASSAY OF CALCIUM: CPT

## 2023-12-17 PROCEDURE — A9585: CPT

## 2023-12-17 PROCEDURE — 86900 BLOOD TYPING SEROLOGIC ABO: CPT

## 2023-12-17 PROCEDURE — 85018 HEMOGLOBIN: CPT

## 2023-12-17 PROCEDURE — 72125 CT NECK SPINE W/O DYE: CPT | Mod: MG

## 2023-12-17 PROCEDURE — 84702 CHORIONIC GONADOTROPIN TEST: CPT

## 2023-12-17 PROCEDURE — 85027 COMPLETE CBC AUTOMATED: CPT

## 2023-12-17 PROCEDURE — 86880 COOMBS TEST DIRECT: CPT

## 2023-12-17 PROCEDURE — 99285 EMERGENCY DEPT VISIT HI MDM: CPT

## 2023-12-17 PROCEDURE — 80048 BASIC METABOLIC PNL TOTAL CA: CPT

## 2023-12-17 PROCEDURE — 85025 COMPLETE CBC W/AUTO DIFF WBC: CPT

## 2023-12-17 PROCEDURE — 82947 ASSAY GLUCOSE BLOOD QUANT: CPT

## 2023-12-17 PROCEDURE — C1889: CPT

## 2023-12-17 PROCEDURE — 70450 CT HEAD/BRAIN W/O DYE: CPT | Mod: MG

## 2023-12-17 PROCEDURE — 85730 THROMBOPLASTIN TIME PARTIAL: CPT

## 2023-12-17 PROCEDURE — G1004: CPT

## 2023-12-17 PROCEDURE — 85610 PROTHROMBIN TIME: CPT

## 2023-12-17 PROCEDURE — 97162 PT EVAL MOD COMPLEX 30 MIN: CPT

## 2023-12-17 PROCEDURE — 83735 ASSAY OF MAGNESIUM: CPT

## 2023-12-17 PROCEDURE — 84295 ASSAY OF SERUM SODIUM: CPT

## 2023-12-17 PROCEDURE — 86901 BLOOD TYPING SEROLOGIC RH(D): CPT

## 2023-12-17 PROCEDURE — 86850 RBC ANTIBODY SCREEN: CPT

## 2023-12-17 PROCEDURE — 72156 MRI NECK SPINE W/O & W/DYE: CPT | Mod: MH

## 2023-12-17 PROCEDURE — 84100 ASSAY OF PHOSPHORUS: CPT

## 2023-12-17 PROCEDURE — 36415 COLL VENOUS BLD VENIPUNCTURE: CPT

## 2023-12-17 PROCEDURE — 86922 COMPATIBILITY TEST ANTIGLOB: CPT

## 2023-12-17 PROCEDURE — 84132 ASSAY OF SERUM POTASSIUM: CPT

## 2023-12-17 PROCEDURE — 86870 RBC ANTIBODY IDENTIFICATION: CPT

## 2023-12-17 RX ORDER — PANTOPRAZOLE SODIUM 20 MG/1
1 TABLET, DELAYED RELEASE ORAL
Qty: 5 | Refills: 0
Start: 2023-12-17 | End: 2023-12-21

## 2023-12-17 RX ORDER — DEXAMETHASONE 0.5 MG/5ML
2 ELIXIR ORAL
Qty: 14 | Refills: 0
Start: 2023-12-17 | End: 2023-12-21

## 2023-12-17 RX ORDER — SENNA PLUS 8.6 MG/1
2 TABLET ORAL
Qty: 14 | Refills: 0
Start: 2023-12-17 | End: 2023-12-23

## 2023-12-17 RX ORDER — ACETAMINOPHEN 500 MG
2 TABLET ORAL
Qty: 0 | Refills: 0 | DISCHARGE
Start: 2023-12-17

## 2023-12-17 RX ORDER — NALOXONE HYDROCHLORIDE 4 MG/.1ML
4 SPRAY NASAL
Qty: 1 | Refills: 0
Start: 2023-12-17 | End: 2023-12-17

## 2023-12-17 RX ORDER — DIAZEPAM 5 MG
1 TABLET ORAL
Qty: 15 | Refills: 0
Start: 2023-12-17 | End: 2023-12-21

## 2023-12-17 RX ORDER — LACTULOSE 10 G/15ML
20 SOLUTION ORAL ONCE
Refills: 0 | Status: COMPLETED | OUTPATIENT
Start: 2023-12-17 | End: 2023-12-17

## 2023-12-17 RX ORDER — POLYETHYLENE GLYCOL 3350 17 G/17G
17 POWDER, FOR SOLUTION ORAL
Qty: 1 | Refills: 0
Start: 2023-12-17 | End: 2023-12-23

## 2023-12-17 RX ORDER — TRAMADOL HYDROCHLORIDE 50 MG/1
1 TABLET ORAL
Qty: 20 | Refills: 0
Start: 2023-12-17 | End: 2023-12-21

## 2023-12-17 RX ADMIN — LACTULOSE 20 GRAM(S): 10 SOLUTION ORAL at 13:41

## 2023-12-17 RX ADMIN — Medication 1000 MILLIGRAM(S): at 14:41

## 2023-12-17 RX ADMIN — Medication 1000 MILLIGRAM(S): at 05:17

## 2023-12-17 RX ADMIN — Medication 4 MILLIGRAM(S): at 05:18

## 2023-12-17 RX ADMIN — Medication 1000 MILLIGRAM(S): at 13:41

## 2023-12-17 RX ADMIN — TRAMADOL HYDROCHLORIDE 50 MILLIGRAM(S): 50 TABLET ORAL at 09:29

## 2023-12-17 RX ADMIN — Medication 4 MILLIGRAM(S): at 11:24

## 2023-12-17 RX ADMIN — PANTOPRAZOLE SODIUM 40 MILLIGRAM(S): 20 TABLET, DELAYED RELEASE ORAL at 06:40

## 2023-12-17 RX ADMIN — TRAMADOL HYDROCHLORIDE 50 MILLIGRAM(S): 50 TABLET ORAL at 10:29

## 2023-12-17 NOTE — DISCHARGE NOTE NURSING/CASE MANAGEMENT/SOCIAL WORK - NSDCFUADDAPPT_GEN_ALL_CORE_FT
Please call Dr. Price' office to confirm your appointment on 12/28 at 12pm.    Please follow up with your primary care provider.

## 2023-12-17 NOTE — DISCHARGE NOTE NURSING/CASE MANAGEMENT/SOCIAL WORK - PATIENT PORTAL LINK FT
You can access the FollowMyHealth Patient Portal offered by  by registering at the following website: [email or web address]. By joining [employer]’s FollowMyHealth portal, you will also be able to view your health information using other applications (apps) compatible with our system. You can access the FollowMyHealth Patient Portal offered by [facility] by registering at the following website: [email or web address]. By joining [employer]’s FollowMyHealth portal, you will also be able to view your health information using other applications (apps) compatible with our system.

## 2023-12-17 NOTE — PROGRESS NOTE ADULT - ASSESSMENT
41 y/o F with pmh chiari malformation presents to ED c/o persistent worsened headache and neck pain x 2 days. Admitted to [facility] for further workup.     Chiari Malformation  -now s/p C1 laminectomy and duroplasty 12/14 (chiari decompression)  -pain control per primary team  -pending dc home ttoday    #Atypical chest pain  Patient with atypical left sided sharp chest pain for 1 hour overnight, now resolved. EKG non ischemic.   Patient reportedly with negative treadmill stress test this past year   - No concern for ACS at this time. Suspect MSK related event overnight.    - Follow up outpatient     Dispo: DC home today  41 y/o F with pmh chiari malformation presents to ED c/o persistent worsened headache and neck pain x 2 days. Admitted to [facility] for further workup.     Chiari Malformation  -now s/p C1 laminectomy and duroplasty 12/14 (chiari decompression)  -pain control per primary team  -pending dc home ttoday    #Atypical chest pain  Patient with atypical left sided sharp chest pain for 1 hour overnight, now resolved. EKG non ischemic.   Patient reportedly with negative treadmill stress test this past year   - No concern for ACS at this time. Suspect MSK related event overnight.    - Follow up outpatient     Dispo: DC home today

## 2023-12-17 NOTE — DISCHARGE NOTE NURSING/CASE MANAGEMENT/SOCIAL WORK - NSDCPEFALRISK_GEN_ALL_CORE
For information on Fall & Injury Prevention, visit: [email or web address] OR  [email or web address] OR  https://www.cdc.gov/steadi/patient.html For information on Fall & Injury Prevention, visit: [email or web address] OR  [email or web address] OR  https://www.cdc.gov/steadi/patient.html

## 2023-12-17 NOTE — CHART NOTE - NSCHARTNOTEFT_GEN_A_CORE
12/12: JOSAFAT overnight. MRI c-spine / MR brain w/ CSF flow pending. Robaxin helping w/ neck stiffness. Patient has medical clearance per Dr. Berrios for chiari decompression Thursday 12/14 with Dr. Price.
Patient requires shower chair and commode due to limitations s/p SOC, c1 laminectomy for chiari malformation, duroplasty.

## 2023-12-17 NOTE — PROGRESS NOTE ADULT - REASON FOR ADMISSION
chiari malformation

## 2023-12-17 NOTE — PROGRESS NOTE ADULT - PROVIDER SPECIALTY LIST ADULT
Hospitalist
NSICU
Neurosurgery
NSICU
Hospitalist
Neurosurgery
Hospitalist
Neurosurgery

## 2023-12-17 NOTE — PROGRESS NOTE ADULT - SUBJECTIVE AND OBJECTIVE BOX
INTERVAL EVENTS: no acute events, episode of CP last night     PAST MEDICAL & SURGICAL HISTORY:  No pertinent past medical history    Arachnoid cyst    H/O breast surgery        MEDICATIONS  (STANDING):  acetaminophen     Tablet .. 1000 milliGRAM(s) Oral every 8 hours  dexAMETHasone     Tablet 4 milliGRAM(s) Oral every 6 hours  dexAMETHasone     Tablet   Oral   influenza   Vaccine 0.5 milliLiter(s) IntraMuscular once  pantoprazole    Tablet 40 milliGRAM(s) Oral before breakfast  senna 2 Tablet(s) Oral at bedtime    MEDICATIONS  (PRN):  diazepam    Tablet 5 milliGRAM(s) Oral every 8 hours PRN muscle spasm  ondansetron Injectable 4 milliGRAM(s) IV Push every 6 hours PRN Nausea and/or Vomiting  traMADol 25 milliGRAM(s) Oral every 6 hours PRN Moderate Pain (4 - 6)  traMADol 50 milliGRAM(s) Oral every 6 hours PRN Severe Pain (7 - 10)    T(F): 98.1 (12-17-23 @ 08:56), Max: 98.2 (12-16-23 @ 20:35)  HR: 90 (12-17-23 @ 08:56) (67 - 90)  BP: 106/69 (12-17-23 @ 08:56) (106/69 - 132/78)  BP(mean): --  ABP: --  ABP(mean): --  RR: 18 (12-17-23 @ 08:56) (17 - 18)  SpO2: 97% (12-17-23 @ 08:56) (95% - 98%)    I/O Detail 24H      PHYSICAL EXAM:  General: patient seen laying supine in bed in NAD  Neuro: AAOx3, follows commands, opens eyes spontaneously, speech clear and fluent,  face symmetric,  strength 5/5 b/l upper extremities and lower extremities, sensation intact to light touch throughout  HEENT: PERRL  Neck: supple, +[place] J collar  Cardiac: RRR, S1S2  Pulmonary: chest rise symmetric  Abdomen: soft, nontender, nondistended  Ext: perfusing well  Skin: warm, dry  Wound: posterior cervical incision dressing c/d/i    LABS:  CBC 12-17-23 @ 05:56                        12.1   13.96 )-----------( 282                   36.4       Hgb trend: 12.1 <-- , 12.4 <-- , 12.5 <-- , 13.0 <--   WBC trend: 13.96 <-- , 14.93 <-- , 9.68 <-- , 6.01 <--       CMP 12-17-23 @ 05:56    140  |  104  |  16  ----------------------------<  143<H>  4.1   |  26  |  0.45<L>    Ca    9.4      12-17-23 @ 05:56  Phos  3.1     12-17  Mg     2.3     12-17        Serum Cr trend: 0.45 <-- , 0.47 <-- , 0.48 <-- , 0.51 <--         Cardiac Markers           STUDIES:

## 2023-12-17 NOTE — PROGRESS NOTE ADULT - SUBJECTIVE AND OBJECTIVE BOX
HPI:  43 y/o F with pmh chiari malformation presents to ED c/o persistent worsened headache and neck pain x 2 days. Patient reports that she has experienced headaches intermittently for more than 10 years related to her chiari malformation. The pain typically is elicited with quick head movements or laughing hard but usually resolves. Patient reports that she also has some mild dizziness associated with her recent headache. She has taken tylenol and motrin over the counter for pain without relief. Patient denies nausea, vomiting, vision changes, extremity numbness or weakness, speech changes and confusion. (11 Dec 2023 18:40)    HOSPITAL COURSE:  12/11: adm, CTH/CT C spine complete  12/12: JOSAFAT overnight. MRI c-spine / MR brain w/ CSF flow pending, robaxin helping w/ neck stiffness, has medical clearance for chiari decompression Thursday 12/14. MRI read Chiari I malformation w tonsillar herniation.   12/13: Preop for 12/14. Consent obtained and signed in chart. Pending EKG and CXR final read.  12/14: Plan for OR today for chiari decompression. POD0 SOC, C1 laminectomy and duroplasty for chiari decompression ,   12/15: POD 1 SOC, C1 laminectomy and duroplasty for chiari decompression, JOSAFAT ovn, PT/OT rec no needs  12/16: POD2, JOSAFAT overnight. SQL held d/t high risk of bleeding. Patient ambulating well and getting OOB frequently.    OVERNIGHT EVENTS: POD 3. JOSAFAT overnight. Plan for home today.     Vital Signs Last 24 Hrs  T(C): 36.8 (16 Dec 2023 20:35), Max: 36.9 (16 Dec 2023 09:18)  T(F): 98.2 (16 Dec 2023 20:35), Max: 98.4 (16 Dec 2023 09:18)  HR: 72 (16 Dec 2023 20:35) (72 - 84)  BP: 130/84 (16 Dec 2023 20:35) (130/84 - 132/79)  BP(mean): --  RR: 18 (16 Dec 2023 20:35) (16 - 18)  SpO2: 98% (16 Dec 2023 20:35) (96% - 98%)    Parameters below as of 16 Dec 2023 20:35  Patient On (Oxygen Delivery Method): room air    I&O's Summary    15 Dec 2023 07:01  -  16 Dec 2023 07:00  --------------------------------------------------------  IN: 395 mL / OUT: 825 mL / NET: -430 mL    PHYSICAL EXAM:  General: Pt is sitting up comfortably in bed, in NAD, on RA  HEENT: CN II-XII grossly intact, PERRL 3mm, EOMI B/L, face symmetric, tongue midline, + [place] J collar in place  Cardiovascular: RRR, normal S1 and S2   Respiratory: non-labored breathing, symmetric chest rise   GI: abd soft, NTND   Neuro: A&O x 3, no aphasia, speech clear, no dysmetria, no pronator drift  Strength 5/5 throughout all 4 extremities  Sensation intact to light touch throughout   Vascular: Distal pulses 2+ x4, no calf edema or erythema  Wounds: suboccipital crani wound C/D/I    DIET:  [] NPO  [X] Mechanical  [] Tube feeds    LABS:                        12.4   14.93 )-----------( 273      ( 16 Dec 2023 06:06 )             37.0     12-16    139  |  104  |  14  ----------------------------<  156<H>  4.4   |  26  |  0.47<L>    Ca    9.4      16 Dec 2023 06:06  Phos  3.0     12-16  Mg     2.2     12-16    Urinalysis Basic - ( 16 Dec 2023 06:06 )    Color: x / Appearance: x / SG: x / pH: x  Gluc: 156 mg/dL / Ketone: x  / Bili: x / Urobili: x   Blood: x / Protein: x / Nitrite: x   Leuk Esterase: x / RBC: x / WBC x   Sq Epi: x / Non Sq Epi: x / Bacteria: x    Allergies    No Known Allergies    Intolerances      MEDICATIONS:  Antibiotics:    Neuro:  acetaminophen     Tablet .. 1000 milliGRAM(s) Oral every 8 hours  diazepam    Tablet 5 milliGRAM(s) Oral every 8 hours PRN  ondansetron Injectable 4 milliGRAM(s) IV Push every 6 hours PRN  traMADol 25 milliGRAM(s) Oral every 6 hours PRN  traMADol 50 milliGRAM(s) Oral every 6 hours PRN    Anticoagulation:    OTHER:  dexAMETHasone     Tablet 4 milliGRAM(s) Oral every 6 hours  dexAMETHasone     Tablet   Oral   influenza   Vaccine 0.5 milliLiter(s) IntraMuscular once  pantoprazole    Tablet 40 milliGRAM(s) Oral before breakfast  senna 2 Tablet(s) Oral at bedtime    ASSESSMENT:  43 y/o F with PMH chiari malformation presents to ED c/o persistent worsened headache and neck pain x 2 days. Now s/p SOC, C1 laminectomy and duroplasty for chiari decompression (12/14/23).     HEADACHE    No pertinent family history in first degree relatives    Handoff    MEWS Score    No pertinent past medical history    Arachnoid cyst    Chiari I malformation    Craniectomy, for Chiari malformation treatment    Headache    Chiari I malformation    Arachnoid cyst    Craniectomy, for Chiari malformation treatment    H/O breast surgery    NECK PAIN    90+    [email or web address]    PLAN:  Neuro:  - neuro/vital checks q4  - decadron 6q6 taper over 1 week to off  - pain control prn, tramadol 25/50 prn, valium 5q8 prn   - no postop imaging needed  - [place] J collar at all times except when showering    Cardiac:  - SBP goal 100-140    Pulmonary:  - on RA    GI:  - Regular diet  - Bowel regimen  - pantoprazole for hx gastritis and while on steroids    Renal:  - IVL  - voiding    Heme:  - SCDs DVT ppx  - holding SQL prophylaxis at this time due to high risk of bleeding    Endo:  - no issues    ID:  - afebrile    Disposition: Regional status, full code, PT/OT rec no needs     D/w Dr. Price   HPI:  43 y/o F with pmh chiari malformation presents to ED c/o persistent worsened headache and neck pain x 2 days. Patient reports that she has experienced headaches intermittently for more than 10 years related to her chiari malformation. The pain typically is elicited with quick head movements or laughing hard but usually resolves. Patient reports that she also has some mild dizziness associated with her recent headache. She has taken tylenol and motrin over the counter for pain without relief. Patient denies nausea, vomiting, vision changes, extremity numbness or weakness, speech changes and confusion. (11 Dec 2023 18:40)    HOSPITAL COURSE:  12/11: adm, CTH/CT C spine complete  12/12: JOSAFAT overnight. MRI c-spine / MR brain w/ CSF flow pending, robaxin helping w/ neck stiffness, has medical clearance for chiari decompression Thursday 12/14. MRI read Chiari I malformation w tonsillar herniation.   12/13: Preop for 12/14. Consent obtained and signed in chart. Pending EKG and CXR final read.  12/14: Plan for OR today for chiari decompression. POD0 SOC, C1 laminectomy and duroplasty for chiari decompression ,   12/15: POD 1 SOC, C1 laminectomy and duroplasty for chiari decompression, JOSAFAT ovn, PT/OT rec no needs  12/16: POD2, JOSAFAT overnight. SQL held d/t high risk of bleeding. Patient ambulating well and getting OOB frequently.    OVERNIGHT EVENTS: POD 3. JOSAFAT overnight. Plan for home today.     Vital Signs Last 24 Hrs  T(C): 36.8 (16 Dec 2023 20:35), Max: 36.9 (16 Dec 2023 09:18)  T(F): 98.2 (16 Dec 2023 20:35), Max: 98.4 (16 Dec 2023 09:18)  HR: 72 (16 Dec 2023 20:35) (72 - 84)  BP: 130/84 (16 Dec 2023 20:35) (130/84 - 132/79)  BP(mean): --  RR: 18 (16 Dec 2023 20:35) (16 - 18)  SpO2: 98% (16 Dec 2023 20:35) (96% - 98%)    Parameters below as of 16 Dec 2023 20:35  Patient On (Oxygen Delivery Method): room air    I&O's Summary    15 Dec 2023 07:01  -  16 Dec 2023 07:00  --------------------------------------------------------  IN: 395 mL / OUT: 825 mL / NET: -430 mL    PHYSICAL EXAM:  General: Pt is sitting up comfortably in bed, in NAD, on RA  HEENT: CN II-XII grossly intact, PERRL 3mm, EOMI B/L, face symmetric, tongue midline, + [place] J collar in place  Cardiovascular: RRR, normal S1 and S2   Respiratory: non-labored breathing, symmetric chest rise   GI: abd soft, NTND   Neuro: A&O x 3, no aphasia, speech clear, no dysmetria, no pronator drift  Strength 5/5 throughout all 4 extremities  Sensation intact to light touch throughout   Vascular: Distal pulses 2+ x4, no calf edema or erythema  Wounds: suboccipital crani wound C/D/I    DIET:  [] NPO  [X] Mechanical  [] Tube feeds    LABS:                        12.4   14.93 )-----------( 273      ( 16 Dec 2023 06:06 )             37.0     12-16    139  |  104  |  14  ----------------------------<  156<H>  4.4   |  26  |  0.47<L>    Ca    9.4      16 Dec 2023 06:06  Phos  3.0     12-16  Mg     2.2     12-16    Urinalysis Basic - ( 16 Dec 2023 06:06 )    Color: x / Appearance: x / SG: x / pH: x  Gluc: 156 mg/dL / Ketone: x  / Bili: x / Urobili: x   Blood: x / Protein: x / Nitrite: x   Leuk Esterase: x / RBC: x / WBC x   Sq Epi: x / Non Sq Epi: x / Bacteria: x    Allergies    No Known Allergies    Intolerances      MEDICATIONS:  Antibiotics:    Neuro:  acetaminophen     Tablet .. 1000 milliGRAM(s) Oral every 8 hours  diazepam    Tablet 5 milliGRAM(s) Oral every 8 hours PRN  ondansetron Injectable 4 milliGRAM(s) IV Push every 6 hours PRN  traMADol 25 milliGRAM(s) Oral every 6 hours PRN  traMADol 50 milliGRAM(s) Oral every 6 hours PRN    Anticoagulation:    OTHER:  dexAMETHasone     Tablet 4 milliGRAM(s) Oral every 6 hours  dexAMETHasone     Tablet   Oral   influenza   Vaccine 0.5 milliLiter(s) IntraMuscular once  pantoprazole    Tablet 40 milliGRAM(s) Oral before breakfast  senna 2 Tablet(s) Oral at bedtime    ASSESSMENT:  43 y/o F with PMH chiari malformation presents to ED c/o persistent worsened headache and neck pain x 2 days. Now s/p SOC, C1 laminectomy and duroplasty for chiari decompression (12/14/23).     HEADACHE    No pertinent family history in first degree relatives    Handoff    MEWS Score    No pertinent past medical history    Arachnoid cyst    Chiari I malformation    Craniectomy, for Chiari malformation treatment    Headache    Chiari I malformation    Arachnoid cyst    Craniectomy, for Chiari malformation treatment    H/O breast surgery    NECK PAIN    90+    [email or web address]    PLAN:  Neuro:  - neuro/vital checks q4  - decadron 6q6 taper over 1 week to off  - pain control prn, tramadol 25/50 prn, valium 5q8 prn   - no postop imaging needed  - [place] J collar at all times except when showering    Cardiac:  - SBP goal 100-140    Pulmonary:  - on RA    GI:  - Regular diet  - Bowel regimen  - pantoprazole for hx gastritis and while on steroids    Renal:  - IVL  - voiding    Heme:  - SCDs DVT ppx  - holding SQL prophylaxis at this time due to high risk of bleeding    Endo:  - no issues    ID:  - afebrile    Disposition: Regional status, full code, PT/OT rec no needs     D/w Dr. Price

## 2023-12-22 DIAGNOSIS — Z79.899 OTHER LONG TERM (CURRENT) DRUG THERAPY: ICD-10-CM

## 2023-12-22 DIAGNOSIS — R07.89 OTHER CHEST PAIN: ICD-10-CM

## 2023-12-22 DIAGNOSIS — E66.3 OVERWEIGHT: ICD-10-CM

## 2023-12-22 DIAGNOSIS — R51.9 HEADACHE, UNSPECIFIED: ICD-10-CM

## 2023-12-22 DIAGNOSIS — G93.5 COMPRESSION OF BRAIN: ICD-10-CM

## 2023-12-22 DIAGNOSIS — G93.2 BENIGN INTRACRANIAL HYPERTENSION: ICD-10-CM

## 2023-12-22 DIAGNOSIS — G93.0 CEREBRAL CYSTS: ICD-10-CM

## 2023-12-28 ENCOUNTER — APPOINTMENT (OUTPATIENT)
Dept: NEUROSURGERY | Facility: CLINIC | Age: 43
End: 2023-12-28
Payer: MEDICAID

## 2023-12-28 VITALS
SYSTOLIC BLOOD PRESSURE: 113 MMHG | TEMPERATURE: 98.1 F | RESPIRATION RATE: 18 BRPM | HEART RATE: 78 BPM | DIASTOLIC BLOOD PRESSURE: 78 MMHG | OXYGEN SATURATION: 96 %

## 2023-12-28 PROCEDURE — 99024 POSTOP FOLLOW-UP VISIT: CPT

## 2023-12-28 RX ORDER — TRAMADOL HYDROCHLORIDE 50 MG/1
50 TABLET, COATED ORAL EVERY 8 HOURS
Qty: 21 | Refills: 0 | Status: ACTIVE | COMMUNITY
Start: 2023-12-28 | End: 1900-01-01

## 2023-12-28 NOTE — ASSESSMENT
[FreeTextEntry1] : Shower daily. Wash hair with mild shampoo, i.e. Buster and Buster.  Pat incision line dry with dry separate towel. Report all s/s infection including drainage, redness, warmth, fever, weakness, chills. Follow-up with Dr. Price in 2 weeks for post-op visit. No tub baths/pools for 8 weeks. Keep incision covered and protected from sun for 3-6 months following surgery. Can remove cervical collar per Dr. Price at this point Sutures removed without difficulty at today's visit Pain medications reordered - instructed to take stool softeners tid to avoid opioid induced constipation. Advised to not drive or use alcohol while taking medications  Patient verbalizes agreement and understanding with plan.

## 2023-12-28 NOTE — PHYSICAL EXAM
[General Appearance - Alert] : alert [General Appearance - In No Acute Distress] : in no acute distress [Clean] : clean [Dry] : dry [Intact] : intact [No Drainage] : without drainage [Normal Skin] : normal [FreeTextEntry1] : Posterior head and neck [FreeTextEntry6] : sutures removed without difficulty [Oriented To Time, Place, And Person] : oriented to person, place, and time [Motor Tone] : muscle tone was normal in all four extremities [Motor Strength] : muscle strength was normal in all four extremities [Sclera] : the sclera and conjunctiva were normal [Outer Ear] : the ears and nose were normal in appearance [Neck Appearance] : the appearance of the neck was normal [Skin Color & Pigmentation] : normal skin color and pigmentation

## 2023-12-28 NOTE — REASON FOR VISIT
[de-identified] : Suboccipital craniectomy, C1 laminectomy for Chiari Malformation decompression; use of operating exoscope [de-identified] : 12/14/23 [de-identified] : Reports she is doing well. Reports incisional pain.  Denies any signs of postop wound infection which could include but is not limited to redness/swelling/purulent drainage Denies CP/SOB/unilateral leg edema. Denies headaches, nausea, vomiting, dizziness, weakness.  He is slowly introducing preop activities She is taking tramadol and valium prn.  Her  thinks he may have seen a small amount of drainage after she showered last week but at today's visit, incision is clean and dry with no evidence of drainage. [Spouse] : spouse

## 2023-12-28 NOTE — HISTORY OF PRESENT ILLNESS
[FreeTextEntry1] : 42 year old woman with history of Chiari malformation who is well known to our practice. She initially established care with Dr. Price in 2018 due to findings of Chiari malformation and arachnoid cyst. Due to severe headaches consistent with increased pressure, suboccipital decompression was recommended but she was unable to proceed with surgery at the time.  She returns today due to worsening symptoms. She states headaches are becoming more frequent. Headaches are worsened by coughing, laughing, bending, sneezing. Denies changes in vision. Reports intermittent numbness/tingling bilateral legs. She also reports imbalance.   She has had not had recent imaging.  Denies visual changes, nausea, vomiting, dizziness.

## 2024-01-04 ENCOUNTER — APPOINTMENT (OUTPATIENT)
Dept: NEUROSURGERY | Facility: CLINIC | Age: 44
End: 2024-01-04
Payer: MEDICAID

## 2024-01-04 VITALS
SYSTOLIC BLOOD PRESSURE: 124 MMHG | HEIGHT: 64 IN | BODY MASS INDEX: 38.41 KG/M2 | RESPIRATION RATE: 18 BRPM | TEMPERATURE: 97.7 F | HEART RATE: 78 BPM | OXYGEN SATURATION: 98 % | DIASTOLIC BLOOD PRESSURE: 81 MMHG | WEIGHT: 225 LBS

## 2024-01-04 PROCEDURE — 99024 POSTOP FOLLOW-UP VISIT: CPT

## 2024-01-05 NOTE — PHYSICAL EXAM
[General Appearance - Alert] : alert [General Appearance - In No Acute Distress] : in no acute distress [Clean] : clean [Dry] : dry [Intact] : intact [No Drainage] : without drainage [Normal Skin] : normal [FreeTextEntry1] : Posterior head and neck [Oriented To Time, Place, And Person] : oriented to person, place, and time [Motor Tone] : muscle tone was normal in all four extremities [Motor Strength] : muscle strength was normal in all four extremities [Sclera] : the sclera and conjunctiva were normal [Outer Ear] : the ears and nose were normal in appearance [Neck Appearance] : the appearance of the neck was normal [Skin Color & Pigmentation] : normal skin color and pigmentation

## 2024-01-05 NOTE — ASSESSMENT
[FreeTextEntry1] : Incision healing well  Shower daily. Wash hair with mild shampoo, i.e. Buster and Buster.  Pat incision line dry with dry separate towel. Report all s/s infection including drainage, redness, warmth, fever, weakness, chills. No tub baths/pools for 8 weeks. Keep incision covered and protected from sun for 3-6 months following surgery. Continue tramadol and valium prn -instructed to take stool softeners tid to avoid opioid induced constipation. Advised to not drive or use alcohol while taking medications  Patient verbalizes agreement and understanding with plan.  Dr. Price present for visit and agrees with above plan.

## 2024-01-05 NOTE — REASON FOR VISIT
[de-identified] : Suboccipital craniectomy, C1 laminectomy for Chiari Malformation decompression; use of operating exoscope [de-identified] : 12/14/23 [de-identified] : Reports she is doing well. Reports incisional pain and tenderness near upper portion of incision, where there is scabbing. Denies any signs of postop wound infection which could include but is not limited to redness/swelling/purulent drainage Denies CP/SOB/unilateral leg edema. Denies headaches, nausea, vomiting, dizziness, weakness.  He is slowly introducing preop activities She is taking tramadol and valium prn.  Her  thinks he may have seen a small amount of drainage after she showered last week but at today's visit, incision is clean and dry with no evidence of drainage.

## 2024-01-23 RX ORDER — DIAZEPAM 5 MG/1
5 TABLET ORAL
Qty: 21 | Refills: 0 | Status: ACTIVE | COMMUNITY
Start: 2023-12-28 | End: 1900-01-01

## 2024-01-25 ENCOUNTER — APPOINTMENT (OUTPATIENT)
Dept: NEUROSURGERY | Facility: CLINIC | Age: 44
End: 2024-01-25
Payer: MEDICAID

## 2024-01-25 ENCOUNTER — LABORATORY RESULT (OUTPATIENT)
Age: 44
End: 2024-01-25

## 2024-01-25 DIAGNOSIS — T14.8XXA OTHER INJURY OF UNSPECIFIED BODY REGION, INITIAL ENCOUNTER: ICD-10-CM

## 2024-01-25 DIAGNOSIS — L08.9 OTHER INJURY OF UNSPECIFIED BODY REGION, INITIAL ENCOUNTER: ICD-10-CM

## 2024-01-25 PROCEDURE — 99024 POSTOP FOLLOW-UP VISIT: CPT

## 2024-01-25 RX ORDER — CEPHALEXIN 500 MG
1 CAPSULE ORAL
Refills: 0 | DISCHARGE
Start: 2024-01-25

## 2024-01-25 RX ORDER — PANTOPRAZOLE 40 MG/1
40 TABLET, DELAYED RELEASE ORAL DAILY
Qty: 30 | Refills: 0 | Status: ACTIVE | COMMUNITY
Start: 2024-01-25 | End: 1900-01-01

## 2024-01-25 RX ORDER — CEPHALEXIN 500 MG/1
500 TABLET ORAL 3 TIMES DAILY
Qty: 42 | Refills: 0 | Status: ACTIVE | COMMUNITY
Start: 2024-01-25 | End: 1900-01-01

## 2024-01-26 ENCOUNTER — NON-APPOINTMENT (OUTPATIENT)
Age: 44
End: 2024-01-26

## 2024-01-26 NOTE — ASSESSMENT
[FreeTextEntry1] : Small amount of dehiscence to distal portion of incision; sutures placed by Dr. Price Small amount of serous drainage collected and sent for culture Will treat with keflex 500 mg tid X 14 days pending culture results Return on Monday for wound check  Shower daily. Wash hair with mild shampoo, i.e. Buster and Buster.  Pat incision line dry with dry separate towel. Report all s/s infection including drainage, redness, warmth, fever, weakness, chills. Follow-up with Dr. Price in 2 weeks for post-op visit. No tub baths/pools for 8 weeks. Keep incision covered and protected from sun for 3-6 months following surgery.  Patient verbalizes agreement and understanding with plan.  I, Dr. Price, personally performed the evaluation and management (E/M) services for this established patient who presents today with (a) new problem(s)/exacerbation of (an) existing condition(s).  That E/M includes conducting the examination, assessing all new/exacerbated conditions, and establishing a new plan of care.  Today, my ACP, Claudia Reddy, was here to observe my evaluation and management services for this new problem/exacerbated condition to be followed going forward.

## 2024-01-26 NOTE — PHYSICAL EXAM
[General Appearance - Alert] : alert [General Appearance - In No Acute Distress] : in no acute distress [Serous] : exhibiting serous drainage [Normal Skin] : normal [FreeTextEntry1] : small dehiscence to distal portion of incision with small amount of serous drainage [FreeTextEntry6] : sutures placed to tiny dehiscence by Dr. Price [Oriented To Time, Place, And Person] : oriented to person, place, and time [Motor Tone] : muscle tone was normal in all four extremities [Motor Strength] : muscle strength was normal in all four extremities [Sclera] : the sclera and conjunctiva were normal [Outer Ear] : the ears and nose were normal in appearance [Neck Appearance] : the appearance of the neck was normal [] : no respiratory distress [Skin Color & Pigmentation] : normal skin color and pigmentation

## 2024-01-26 NOTE — REASON FOR VISIT
[de-identified] : Suboccipital craniectomy, C1 laminectomy for Chiari Malformation decompression; use of operating exoscope [de-identified] : 12/14/23 [de-identified] : Returns today for wound check and to evaluate progress. She states she woke up in the morning with a large amount of yellow drainage from the back of her head and on the pillow. She denies fever/chills. She states she felt a great deal of pressure prior to the fluid coming out and now has relief.  [Family Member] : family member

## 2024-01-29 ENCOUNTER — INPATIENT (INPATIENT)
Facility: HOSPITAL | Age: 44
LOS: 3 days | Discharge: HOME | DRG: 856 | End: 2024-02-02
Attending: NEUROLOGICAL SURGERY | Admitting: NEUROLOGICAL SURGERY
Payer: COMMERCIAL

## 2024-01-29 ENCOUNTER — APPOINTMENT (OUTPATIENT)
Dept: NEUROSURGERY | Facility: CLINIC | Age: 44
End: 2024-01-29
Payer: MEDICAID

## 2024-01-29 VITALS
DIASTOLIC BLOOD PRESSURE: 82 MMHG | HEIGHT: 64 IN | RESPIRATION RATE: 18 BRPM | HEART RATE: 82 BPM | OXYGEN SATURATION: 98 % | TEMPERATURE: 97.6 F | WEIGHT: 225 LBS | SYSTOLIC BLOOD PRESSURE: 128 MMHG | BODY MASS INDEX: 38.41 KG/M2

## 2024-01-29 VITALS
HEIGHT: 65 IN | OXYGEN SATURATION: 98 % | DIASTOLIC BLOOD PRESSURE: 68 MMHG | TEMPERATURE: 99 F | SYSTOLIC BLOOD PRESSURE: 108 MMHG | RESPIRATION RATE: 18 BRPM | HEART RATE: 77 BPM | WEIGHT: 225.09 LBS

## 2024-01-29 DIAGNOSIS — Z86.69 PERSONAL HISTORY OF OTHER DISEASES OF THE NERVOUS SYSTEM AND SENSE ORGANS: Chronic | ICD-10-CM

## 2024-01-29 DIAGNOSIS — Z98.890 OTHER SPECIFIED POSTPROCEDURAL STATES: Chronic | ICD-10-CM

## 2024-01-29 DIAGNOSIS — Z98.89 OTHER SPECIFIED POSTPROCEDURAL STATES: Chronic | ICD-10-CM

## 2024-01-29 DIAGNOSIS — Z86.69 PERSONAL HISTORY OF OTHER DISEASES OF THE NERVOUS SYSTEM AND SENSE ORGANS: ICD-10-CM

## 2024-01-29 DIAGNOSIS — T81.89XA OTHER COMPLICATIONS OF PROCEDURES, NOT ELSEWHERE CLASSIFIED, INITIAL ENCOUNTER: ICD-10-CM

## 2024-01-29 LAB
ANION GAP SERPL CALC-SCNC: 11 MMOL/L — SIGNIFICANT CHANGE UP (ref 5–17)
APPEARANCE UR: CLEAR — SIGNIFICANT CHANGE UP
APTT BLD: 34.7 SEC — SIGNIFICANT CHANGE UP (ref 24.5–35.6)
BACTERIA # UR AUTO: NEGATIVE /HPF — SIGNIFICANT CHANGE UP
BASOPHILS # BLD AUTO: 0.05 K/UL — SIGNIFICANT CHANGE UP (ref 0–0.2)
BASOPHILS NFR BLD AUTO: 0.8 % — SIGNIFICANT CHANGE UP (ref 0–2)
BILIRUB UR-MCNC: NEGATIVE — SIGNIFICANT CHANGE UP
BLD GP AB SCN SERPL QL: POSITIVE — SIGNIFICANT CHANGE UP
BUN SERPL-MCNC: 18 MG/DL — SIGNIFICANT CHANGE UP (ref 7–23)
CALCIUM SERPL-MCNC: 9.8 MG/DL — SIGNIFICANT CHANGE UP (ref 8.4–10.5)
CHLORIDE SERPL-SCNC: 102 MMOL/L — SIGNIFICANT CHANGE UP (ref 96–108)
CO2 SERPL-SCNC: 23 MMOL/L — SIGNIFICANT CHANGE UP (ref 22–31)
COLOR SPEC: YELLOW — SIGNIFICANT CHANGE UP
CREAT SERPL-MCNC: 0.58 MG/DL — SIGNIFICANT CHANGE UP (ref 0.5–1.3)
DIFF PNL FLD: ABNORMAL
EGFR: 117 ML/MIN/1.73M2 — SIGNIFICANT CHANGE UP
EOSINOPHIL # BLD AUTO: 0.2 K/UL — SIGNIFICANT CHANGE UP (ref 0–0.5)
EOSINOPHIL NFR BLD AUTO: 3.3 % — SIGNIFICANT CHANGE UP (ref 0–6)
GLUCOSE SERPL-MCNC: 101 MG/DL — HIGH (ref 70–99)
GLUCOSE UR QL: NEGATIVE MG/DL — SIGNIFICANT CHANGE UP
HCG UR QL: NEGATIVE — SIGNIFICANT CHANGE UP
HCT VFR BLD CALC: 35.7 % — SIGNIFICANT CHANGE UP (ref 34.5–45)
HGB BLD-MCNC: 12.1 G/DL — SIGNIFICANT CHANGE UP (ref 11.5–15.5)
IMM GRANULOCYTES NFR BLD AUTO: 0.5 % — SIGNIFICANT CHANGE UP (ref 0–0.9)
INR BLD: 1.02 — SIGNIFICANT CHANGE UP (ref 0.85–1.18)
KETONES UR-MCNC: NEGATIVE MG/DL — SIGNIFICANT CHANGE UP
LACTATE SERPL-SCNC: 0.9 MMOL/L — SIGNIFICANT CHANGE UP (ref 0.5–2)
LEUKOCYTE ESTERASE UR-ACNC: NEGATIVE — SIGNIFICANT CHANGE UP
LYMPHOCYTES # BLD AUTO: 1.24 K/UL — SIGNIFICANT CHANGE UP (ref 1–3.3)
LYMPHOCYTES # BLD AUTO: 20.7 % — SIGNIFICANT CHANGE UP (ref 13–44)
MCHC RBC-ENTMCNC: 29.5 PG — SIGNIFICANT CHANGE UP (ref 27–34)
MCHC RBC-ENTMCNC: 33.9 GM/DL — SIGNIFICANT CHANGE UP (ref 32–36)
MCV RBC AUTO: 87.1 FL — SIGNIFICANT CHANGE UP (ref 80–100)
MONOCYTES # BLD AUTO: 0.29 K/UL — SIGNIFICANT CHANGE UP (ref 0–0.9)
MONOCYTES NFR BLD AUTO: 4.8 % — SIGNIFICANT CHANGE UP (ref 2–14)
NEUTROPHILS # BLD AUTO: 4.17 K/UL — SIGNIFICANT CHANGE UP (ref 1.8–7.4)
NEUTROPHILS NFR BLD AUTO: 69.9 % — SIGNIFICANT CHANGE UP (ref 43–77)
NITRITE UR-MCNC: NEGATIVE — SIGNIFICANT CHANGE UP
NRBC # BLD: 0 /100 WBCS — SIGNIFICANT CHANGE UP (ref 0–0)
PH UR: 6 — SIGNIFICANT CHANGE UP (ref 5–8)
PLATELET # BLD AUTO: 254 K/UL — SIGNIFICANT CHANGE UP (ref 150–400)
POTASSIUM SERPL-MCNC: 4 MMOL/L — SIGNIFICANT CHANGE UP (ref 3.5–5.3)
POTASSIUM SERPL-SCNC: 4 MMOL/L — SIGNIFICANT CHANGE UP (ref 3.5–5.3)
PROT UR-MCNC: NEGATIVE MG/DL — SIGNIFICANT CHANGE UP
PROTHROM AB SERPL-ACNC: 11.6 SEC — SIGNIFICANT CHANGE UP (ref 9.5–13)
RBC # BLD: 4.1 M/UL — SIGNIFICANT CHANGE UP (ref 3.8–5.2)
RBC # FLD: 13.2 % — SIGNIFICANT CHANGE UP (ref 10.3–14.5)
RBC CASTS # UR COMP ASSIST: 7 /HPF — HIGH (ref 0–4)
RH IG SCN BLD-IMP: NEGATIVE — SIGNIFICANT CHANGE UP
SODIUM SERPL-SCNC: 136 MMOL/L — SIGNIFICANT CHANGE UP (ref 135–145)
SP GR SPEC: 1.02 — SIGNIFICANT CHANGE UP (ref 1–1.03)
SQUAMOUS # UR AUTO: 1 /HPF — SIGNIFICANT CHANGE UP (ref 0–5)
UROBILINOGEN FLD QL: 0.2 MG/DL — SIGNIFICANT CHANGE UP (ref 0.2–1)
WBC # BLD: 5.98 K/UL — SIGNIFICANT CHANGE UP (ref 3.8–10.5)
WBC # FLD AUTO: 5.98 K/UL — SIGNIFICANT CHANGE UP (ref 3.8–10.5)
WBC UR QL: 1 /HPF — SIGNIFICANT CHANGE UP (ref 0–5)

## 2024-01-29 PROCEDURE — 99285 EMERGENCY DEPT VISIT HI MDM: CPT

## 2024-01-29 PROCEDURE — 99024 POSTOP FOLLOW-UP VISIT: CPT

## 2024-01-29 PROCEDURE — 70460 CT HEAD/BRAIN W/DYE: CPT | Mod: 26

## 2024-01-29 PROCEDURE — 86077 PHYS BLOOD BANK SERV XMATCH: CPT

## 2024-01-29 PROCEDURE — 93010 ELECTROCARDIOGRAM REPORT: CPT

## 2024-01-29 PROCEDURE — 71045 X-RAY EXAM CHEST 1 VIEW: CPT | Mod: 26

## 2024-01-29 RX ORDER — SENNA PLUS 8.6 MG/1
2 TABLET ORAL AT BEDTIME
Refills: 0 | Status: DISCONTINUED | OUTPATIENT
Start: 2024-01-29 | End: 2024-01-31

## 2024-01-29 RX ORDER — TRAMADOL HYDROCHLORIDE 50 MG/1
50 TABLET ORAL EVERY 6 HOURS
Refills: 0 | Status: DISCONTINUED | OUTPATIENT
Start: 2024-01-29 | End: 2024-01-31

## 2024-01-29 RX ORDER — ACETAMINOPHEN 500 MG
650 TABLET ORAL EVERY 6 HOURS
Refills: 0 | Status: DISCONTINUED | OUTPATIENT
Start: 2024-01-29 | End: 2024-01-31

## 2024-01-29 RX ORDER — ONDANSETRON 8 MG/1
4 TABLET, FILM COATED ORAL EVERY 6 HOURS
Refills: 0 | Status: DISCONTINUED | OUTPATIENT
Start: 2024-01-29 | End: 2024-01-31

## 2024-01-29 RX ORDER — PANTOPRAZOLE SODIUM 20 MG/1
40 TABLET, DELAYED RELEASE ORAL
Refills: 0 | Status: DISCONTINUED | OUTPATIENT
Start: 2024-01-30 | End: 2024-01-31

## 2024-01-29 RX ORDER — CEPHALEXIN 500 MG
500 CAPSULE ORAL THREE TIMES A DAY
Refills: 0 | Status: DISCONTINUED | OUTPATIENT
Start: 2024-01-29 | End: 2024-01-31

## 2024-01-29 RX ADMIN — Medication 500 MILLIGRAM(S): at 15:45

## 2024-01-29 RX ADMIN — Medication 500 MILLIGRAM(S): at 22:20

## 2024-01-29 RX ADMIN — TRAMADOL HYDROCHLORIDE 50 MILLIGRAM(S): 50 TABLET ORAL at 18:18

## 2024-01-29 NOTE — H&P ADULT - NSHPPHYSICALEXAM_GEN_ALL_CORE
General: NAD, pt is comfortably sitting up in bed, A&O x3, on RA  HEENT: CN II-XII grossly intact, PERRL 3mm, EOMI b/l, face symmetric, tongue midline, neck FROM, +SOC crani incision with 6 non-absorbable sutures at superior portion of incision, small amount of superficial fluctuance to palpation near incision   Cardiovascular: RRR, normal S1 and S2   Respiratory: lungs CTAB, no wheezing, rhonchi, or crackles   GI: normoactive BS to auscultation, abd soft, NTND   Neuro: no aphasia, speech clear, no dysmetria, no pronator drift  strength 5/5 throughout all 4 extremities  sensation intact to light touch throughout   Extremities: distal pulses 2+ x4

## 2024-01-29 NOTE — H&P ADULT - PROBLEM SELECTOR PLAN 2
- admit to regional bed   - continue Keflex 500mg TID at this point   - CTH with IV contrast pend in ED   - f/u infectious work-up (blood cultures x 2, urine cx, CXR)   - pend EKG and medical clearance for OR   - f/u office wound drainage culture (NGTD)

## 2024-01-29 NOTE — H&P ADULT - NSHPREVIEWOFSYSTEMS_GEN_ALL_CORE
CONSTITUTIONAL: No weakness, fevers or chills  EYES/ENT: No visual changes;  No vertigo or throat pain   NECK: No pain or stiffness  RESPIRATORY: No cough, wheezing, hemoptysis; No shortness of breath  CARDIOVASCULAR: No chest pain or palpitations  GASTROINTESTINAL: No abdominal or epigastric pain. No nausea, vomiting, or hematemesis; No diarrhea or constipation. No melena or hematochezia.  GENITOURINARY: No dysuria, frequency or hematuria  NEUROLOGICAL: No numbness or weakness, +headache and incisional pain.   SKIN: No itching, burning, rashes, or lesions   All other review of systems is negative unless indicated above.

## 2024-01-29 NOTE — ED ADULT TRIAGE NOTE - CHIEF COMPLAINT QUOTE
Pt arrives at direction of neurosurgery for post op complication related to surgery for chiari malformation on 12/14: per pt there is increased leak of CSF from surgical site. Site located posteriorly extending from cervical region to occipital region. Pt denies discrete pain or discomfort. CSF has been leaking x several days per pt. Pt is alert, oriented, and ambulatory at time of assessment. PERRL. FAST (-). No other symptoms noted at time of assessment.

## 2024-01-29 NOTE — ED ADULT NURSE NOTE - OBJECTIVE STATEMENT
Patient aaox4 c/o csf leak from surgical site posterior neck, states noticed on 12/21 and then again on 12/25. S/p surgery of chiari malformation 12/14/23. Speaking in full sentences without difficulty, denies sob/cp/dizziness/n/v/fever/chills. IV initiated, labs collected and sent. Awaiting ekg and results.

## 2024-01-29 NOTE — H&P ADULT - HISTORY OF PRESENT ILLNESS
42yo F PMHx Chiari Malformation (s/p SOC, C1 laminectomy with duraplasty on 12/14/23), chronic headaches, presents to [facility] ED c/o SOC incisional clear drainage x 5 days. Per pt 1/25/24 patient woke from sleep and noted her pillow was saturated with clear fluid. Pt was seen in the office by Dr. Price that day who cleaned the wound and removed an area of scabbing from the superior portion of the incision. After the scab was removed, there was clear fluid leaking from the incision which was sent for culture (NGTD), and 6 non-absorbable sutures were placed to close the area of wound dehiscence. Pt was sent home with a prescription for Keflex 500mg TID. Per pt, she then again noticed clear fluid leaking from her incision on Sunday 1/28, and pt was again seen in the office by Dr. Price today who recommended pt be evaluated in the ED for CT with IV contrast and infectious work-up. Pt reports she has mostly incisional pain with some associated b/l temporal headache   Pt denies photophobia, dizziness,  44yo F PMHx Chiari Malformation (s/p SOC, C1 laminectomy with duraplasty on 12/14/23), chronic headaches, presents to [facility] ED c/o SOC incisional clear drainage x 5 days. Per pt 1/25/24 patient woke from sleep and noted her pillow was saturated with clear fluid. Pt was seen in the office by Dr. Price that day who cleaned the wound and removed an area of scabbing from the superior portion of the incision. After the scab was removed, there was clear fluid leaking from the incision which was sent for culture (NGTD), and 6 non-absorbable sutures were placed to close the area of wound dehiscence. Pt was sent home with a prescription for Keflex 500mg TID. Per pt, she then again noticed clear fluid leaking from her incision on Sunday 1/28, and pt was again seen in the office by Dr. Price today who recommended pt be evaluated in the ED for CT with IV contrast and infectious work-up. Pt reports she has mostly incisional pain with some associated b/l temporal headache for which she takes tramadol PRN with some relief. Pt denies photophobia, dizziness, fainting/LOC, meningismus, N/V, fever/chills, recent travel or known sick contact.

## 2024-01-29 NOTE — H&P ADULT - ASSESSMENT
42yo F PMHx Chiari Malformation (s/p SOC, C1 laminectomy with duraplasty on 12/14/23), chronic headaches, presents to [facility] ED c/o SOC incisional clear drainage x 5 days. Pt started on Keflex 500mg TID, incision culture sent, and sutures placed in office on 1/25 for incisional drainage. Pt admitted to [facility] for infectious work-up and OR planning for wound revision and possible washout this week with Dr. Price.

## 2024-01-29 NOTE — ED ADULT NURSE NOTE - NSFALLUNIVINTERV_ED_ALL_ED
Bed/Stretcher in lowest position, wheels locked, appropriate side rails in place/Call bell, personal items and telephone in reach/Instruct patient to call for assistance before getting out of bed/chair/stretcher/Non-slip footwear applied when patient is off stretcher/[place] to call system/Physically safe environment - no spills, clutter or unnecessary equipment/Purposeful proactive rounding/Room/bathroom lighting operational, light cord in reach

## 2024-01-29 NOTE — ED PROVIDER NOTE - CLINICAL SUMMARY MEDICAL DECISION MAKING FREE TEXT BOX
44yo F PMHx Chiari Malformation (s/p SOC, C1 laminectomy with duraplasty on 12/14/23), chronic headaches, presents to [facility] ED c/o SOC incisional clear drainage x 5 days. Per pt 1/25/24 patient woke from sleep and noted her pillow was saturated with clear fluid. Pt was seen in the office by Dr. Price that day who cleaned the wound and removed an area of scabbing from the superior portion of the incision. After the scab was removed, there was clear fluid leaking from the incision which was sent for culture (NGTD), and 6 non-absorbable sutures were placed to close the area of wound dehiscence. Pt was sent home with a prescription for Keflex 500mg TID. Per pt, she then again noticed clear fluid leaking from her incision on Sunday 1/28, and pt was again seen in the office by Dr. Price today who recommended pt be evaluated in the ED for CT with IV contrast and infectious work-up. Pt reports she has mostly incisional pain with some associated b/l temporal headache for which she takes tramadol PRN with some relief. Pt denies photophobia, dizziness, fainting/LOC, meningismus, N/V, fever/chills, recent travel or known sick contact.    VSS  Mid occiput with + stitches and some drainage  As per NS will admit for further mgmt

## 2024-01-29 NOTE — ED PROVIDER NOTE - OBJECTIVE STATEMENT
42yo F PMHx Chiari Malformation (s/p SOC, C1 laminectomy with duraplasty on 12/14/23), chronic headaches, presents to [facility] ED c/o SOC incisional clear drainage x 5 days. Per pt 1/25/24 patient woke from sleep and noted her pillow was saturated with clear fluid. Pt was seen in the office by Dr. Price that day who cleaned the wound and removed an area of scabbing from the superior portion of the incision. After the scab was removed, there was clear fluid leaking from the incision which was sent for culture (NGTD), and 6 non-absorbable sutures were placed to close the area of wound dehiscence. Pt was sent home with a prescription for Keflex 500mg TID. Per pt, she then again noticed clear fluid leaking from her incision on Sunday 1/28, and pt was again seen in the office by Dr. Price today who recommended pt be evaluated in the ED for CT with IV contrast and infectious work-up. Pt reports she has mostly incisional pain with some associated b/l temporal headache for which she takes tramadol PRN with some relief. Pt denies photophobia, dizziness, fainting/LOC, meningismus, N/V, fever/chills, recent travel or known sick contact.

## 2024-01-29 NOTE — ED PROVIDER NOTE - SKIN, MLM
mid lower occiput with stitches and some mild drainage Tissue Cultured Epidermal Autograft Text: The defect edges were debeveled with a #15 scalpel blade.  Given the location of the defect, shape of the defect and the proximity to free margins a tissue cultured epidermal autograft was deemed most appropriate.  The graft was then trimmed to fit the size of the defect.  The graft was then placed in the primary defect and oriented appropriately.

## 2024-01-30 ENCOUNTER — TRANSCRIPTION ENCOUNTER (OUTPATIENT)
Age: 44
End: 2024-01-30

## 2024-01-30 DIAGNOSIS — E66.9 OBESITY, UNSPECIFIED: ICD-10-CM

## 2024-01-30 LAB
ANION GAP SERPL CALC-SCNC: 10 MMOL/L — SIGNIFICANT CHANGE UP (ref 5–17)
BLD GP AB SCN SERPL QL: POSITIVE — SIGNIFICANT CHANGE UP
BUN SERPL-MCNC: 20 MG/DL — SIGNIFICANT CHANGE UP (ref 7–23)
CALCIUM SERPL-MCNC: 9.3 MG/DL — SIGNIFICANT CHANGE UP (ref 8.4–10.5)
CHLORIDE SERPL-SCNC: 103 MMOL/L — SIGNIFICANT CHANGE UP (ref 96–108)
CO2 SERPL-SCNC: 24 MMOL/L — SIGNIFICANT CHANGE UP (ref 22–31)
CREAT SERPL-MCNC: 0.61 MG/DL — SIGNIFICANT CHANGE UP (ref 0.5–1.3)
EGFR: 115 ML/MIN/1.73M2 — SIGNIFICANT CHANGE UP
GLUCOSE SERPL-MCNC: 88 MG/DL — SIGNIFICANT CHANGE UP (ref 70–99)
HCT VFR BLD CALC: 35.8 % — SIGNIFICANT CHANGE UP (ref 34.5–45)
HGB BLD-MCNC: 12 G/DL — SIGNIFICANT CHANGE UP (ref 11.5–15.5)
MAGNESIUM SERPL-MCNC: 2.1 MG/DL — SIGNIFICANT CHANGE UP (ref 1.6–2.6)
MCHC RBC-ENTMCNC: 29.1 PG — SIGNIFICANT CHANGE UP (ref 27–34)
MCHC RBC-ENTMCNC: 33.5 GM/DL — SIGNIFICANT CHANGE UP (ref 32–36)
MCV RBC AUTO: 86.9 FL — SIGNIFICANT CHANGE UP (ref 80–100)
NRBC # BLD: 0 /100 WBCS — SIGNIFICANT CHANGE UP (ref 0–0)
PHOSPHATE SERPL-MCNC: 4.6 MG/DL — HIGH (ref 2.5–4.5)
PLATELET # BLD AUTO: 254 K/UL — SIGNIFICANT CHANGE UP (ref 150–400)
POTASSIUM SERPL-MCNC: 3.9 MMOL/L — SIGNIFICANT CHANGE UP (ref 3.5–5.3)
POTASSIUM SERPL-SCNC: 3.9 MMOL/L — SIGNIFICANT CHANGE UP (ref 3.5–5.3)
RBC # BLD: 4.12 M/UL — SIGNIFICANT CHANGE UP (ref 3.8–5.2)
RBC # FLD: 13.3 % — SIGNIFICANT CHANGE UP (ref 10.3–14.5)
RH IG SCN BLD-IMP: NEGATIVE — SIGNIFICANT CHANGE UP
SODIUM SERPL-SCNC: 137 MMOL/L — SIGNIFICANT CHANGE UP (ref 135–145)
WBC # BLD: 5.85 K/UL — SIGNIFICANT CHANGE UP (ref 3.8–10.5)
WBC # FLD AUTO: 5.85 K/UL — SIGNIFICANT CHANGE UP (ref 3.8–10.5)

## 2024-01-30 PROCEDURE — 99222 1ST HOSP IP/OBS MODERATE 55: CPT

## 2024-01-30 PROCEDURE — 86077 PHYS BLOOD BANK SERV XMATCH: CPT

## 2024-01-30 PROCEDURE — 99024 POSTOP FOLLOW-UP VISIT: CPT

## 2024-01-30 RX ORDER — POVIDONE-IODINE 5 %
1 AEROSOL (ML) TOPICAL ONCE
Refills: 0 | Status: DISCONTINUED | OUTPATIENT
Start: 2024-01-30 | End: 2024-01-31

## 2024-01-30 RX ORDER — INFLUENZA VIRUS VACCINE 15; 15; 15; 15 UG/.5ML; UG/.5ML; UG/.5ML; UG/.5ML
0.5 SUSPENSION INTRAMUSCULAR ONCE
Refills: 0 | Status: DISCONTINUED | OUTPATIENT
Start: 2024-01-30 | End: 2024-02-02

## 2024-01-30 RX ORDER — CHLORHEXIDINE GLUCONATE 213 G/1000ML
1 SOLUTION TOPICAL ONCE
Refills: 0 | Status: COMPLETED | OUTPATIENT
Start: 2024-01-30 | End: 2024-01-31

## 2024-01-30 RX ORDER — SODIUM CHLORIDE 9 MG/ML
1000 INJECTION INTRAMUSCULAR; INTRAVENOUS; SUBCUTANEOUS
Refills: 0 | Status: DISCONTINUED | OUTPATIENT
Start: 2024-01-30 | End: 2024-01-31

## 2024-01-30 RX ADMIN — Medication 500 MILLIGRAM(S): at 21:29

## 2024-01-30 RX ADMIN — Medication 650 MILLIGRAM(S): at 19:17

## 2024-01-30 RX ADMIN — Medication 500 MILLIGRAM(S): at 05:29

## 2024-01-30 RX ADMIN — TRAMADOL HYDROCHLORIDE 50 MILLIGRAM(S): 50 TABLET ORAL at 06:30

## 2024-01-30 RX ADMIN — Medication 500 MILLIGRAM(S): at 14:00

## 2024-01-30 RX ADMIN — TRAMADOL HYDROCHLORIDE 50 MILLIGRAM(S): 50 TABLET ORAL at 05:30

## 2024-01-30 RX ADMIN — PANTOPRAZOLE SODIUM 40 MILLIGRAM(S): 20 TABLET, DELAYED RELEASE ORAL at 05:29

## 2024-01-30 RX ADMIN — Medication 650 MILLIGRAM(S): at 18:17

## 2024-01-30 NOTE — CONSULT NOTE ADULT - ASSESSMENT
43 YOF with PMH Of Chiari malformation s/p suboccipital craniotomy and C1 laminectomy (12/14/23 with Dr. Price) who was noted to have drainage from incision on post-op checks admitted for washout/revision of wound.       Incisional wound drainage r/o SSI  Chiari malformation s/p crani and decompression  - management per primary  - CTH wtih 6.3 cm fluid collection containing air overlying the craniectomy site   - afebrile, no leukocytosis, BC NGTD  - tentative plan for OR tomorrow for washout and/or revision    Pre-op risk stratification and medical optimization  - elevated risk procedure: wound washout and/or revision  - METS >4  - ECG: NSR 79 without acute ischemic changes  - CXR: no acute pathology     43 YOF with PMH Of class II obesity, Chiari malformation s/p suboccipital craniotomy and C1 laminectomy (12/14/23 with Dr. Price) who was noted to have drainage from incision on post-op checks admitted for washout/revision of wound.     Incisional wound drainage r/o SSI  Chiari malformation s/p crani and decompression  - management per primary  - CTH wtih 6.3 cm fluid collection containing air overlying the craniectomy site   - afebrile, no leukocytosis, BC NGTD  - tentative plan for OR tomorrow for washout and/or revision  - pain control with bowel regimen    Pre-op risk stratification and medical optimization  - elevated risk procedure: wound washout and/or revision  - METS >4  - ECG: NSR 79 without acute ischemic changes  - CXR: no acute pathology    - NSQIP with below average risk (<0.1% risk of cardiac events, <0.1%risk of death)  - patient is at low risk for intermediate risk procedure  - no further cardiac testing needed prior to OR  - patient is medically optimized for planned procedure  - please notify if any change in clinical status    Class II obesity  - recommend nutrition consult    Dispo: TBD    Recommendations and plan discussed with primary team.

## 2024-01-30 NOTE — OCCUPATIONAL THERAPY INITIAL EVALUATION ADULT - PERTINENT HX OF CURRENT PROBLEM, REHAB EVAL
44yo F PMHx Chiari Malformation (s/p SOC, C1 laminectomy with duraplasty on 12/14/23), chronic headaches, presents to [facility] ED c/o SOC incisional clear drainage x 5 days. Pt started on Keflex 500mg TID, incision culture sent, and sutures placed in office on 1/25 for incisional drainage. Pt admitted to [facility] for infectious work-up and OR planning for wound revision and possible washout this week with Dr. Price.

## 2024-01-30 NOTE — REASON FOR VISIT
[Family Member] : family member [de-identified] : Suboccipital craniectomy, C1 laminectomy for Chiari Malformation decompression; use of operating exoscope [de-identified] : 12/14/23 [de-identified] :  Reports leaking incision yesterday. Small amount of serous drainage. Denies fevers. Has been taking antibiotics.

## 2024-01-30 NOTE — PHYSICAL THERAPY INITIAL EVALUATION ADULT - ADDITIONAL COMMENTS
pt lives w/ family in an elevator access apt building w/ no stairs to enter. Denies use of DME for amb prior to this admission. states that she has been mostly independent in ADLs since last admission. Denies hx of recent falls.

## 2024-01-30 NOTE — PROGRESS NOTE ADULT - SUBJECTIVE AND OBJECTIVE BOX
Surgery: Repair of suboccipital wound   Consent: Signed by patient     S: Pt has been advised on the risks and benefits of surgery and verbalizes understanding of risks/benefits.     No Known Allergies      OVERNIGHT EVENTS:  1/29: Admitted. Pre-op for Wednesday.   1/30: Pending pre-op labs and medical clearance.     T(C): 36.9 (01-30-24 @ 09:19), Max: 37 (01-29-24 @ 13:27)  HR: 69 (01-30-24 @ 09:19) (69 - 88)  BP: 106/59 (01-30-24 @ 09:19) (99/72 - 116/76)  RR: 17 (01-30-24 @ 09:19) (16 - 18)  SpO2: 98% (01-30-24 @ 09:19) (97% - 100%)  Wt(kg): --    EXAM:  Constitutional: 44 y/o female awake, alert in no acute distress.  Eyes:  Sclera anicteric, conjunctiva noninjected.   ENMT: Oropharyngeal mucosa moist, pink. Tongue midline.    Respiratory: Clear to auscultation bilaterally.  No rales, rhonchi, wheezes.  Cardiovascular: Regular rate and rhythm.  S1, S2 heard.  Gastrointestinal:  Soft, nontender, nondistended.  +BS.  Vascular: Extremities warm, no ulcers, no discoloration of skin.   Neurological: Gen: AA&O x 3, conversant, appropriate. CN II-XII grossly intact. SAM x 4, 5/5 throughout UE/LE. Sensation intact to light touch throughout. DTRs: 2+ symmetric throughout. Hoffmans negative bilaterally.  Plantar downgoing bilaterally.  No clonus.   Skin: Warm, dry, there is some errythema present at subocciptal crani site. No drainage or pus.       01-30    137  |  103  |  20  ----------------------------<  88  3.9   |  24  |  0.61    Ca    9.3      30 Jan 2024 05:30  Phos  4.6     01-30  Mg     2.1     01-30      CBC Full  -  ( 30 Jan 2024 05:30 )  WBC Count : 5.85 K/uL  RBC Count : 4.12 M/uL  Hemoglobin : 12.0 g/dL  Hematocrit : 35.8 %  Platelet Count - Automated : 254 K/uL  Mean Cell Volume : 86.9 fl  Mean Cell Hemoglobin : 29.1 pg  Mean Cell Hemoglobin Concentration : 33.5 gm/dL  Auto Neutrophil # : x  Auto Lymphocyte # : x  Auto Monocyte # : x  Auto Eosinophil # : x  Auto Basophil # : x  Auto Neutrophil % : x  Auto Lymphocyte % : x  Auto Monocyte % : x  Auto Eosinophil % : x  Auto Basophil % : x    PT/INR - ( 29 Jan 2024 13:43 )   PT: 11.6 sec;   INR: 1.02          PTT - ( 29 Jan 2024 13:43 )  PTT:34.7 sec    Pregnancy test: negative   Type & Screen (in past 72hrs): resulted in chart (Type A)     CXR:  EKG:  ECHO:  Medical Clearances:      Last dose of antiplatelet/anticoagulation drug: NA     Implanted Devices (pacemaker, drug pump...etc):  []YES   [x] NO                  If yes --> EPS consulted to interrogate/adjust device:    3M nasal swab ordered?  x_Y  _N  Cranial surgery: Order written for hair to be shampooed night before surgery  [] yes   []no                 Assessment: 44 y/o female PMHx Chiari Malformation (s/p SOC, C1 laminectomy with duraplasty on 12/14/23), chronic headaches, presents to [facility] ED c/o SOC incisional clear drainage x 5 days, CTH showing 6.3 m fluid collection plan for OR tomorrow for repair of suboccipital wound.    Plan:  NEURO:   - neuro and vitals checks q4hrs   - reigonal bed   - CTH with IV contrast 1/29:  6.3 cm fluid collection containing air   overlying the craniectomy site which may represent postsurgical   changes/pseudomeningocele versus infection  - cont home tramadol and tylenol PRN for headache   - OR tomorrow 1/30 for repair of suboccipital wound, obtain medical clearance, PT/PTT/INR, T&S, NPO after midnight     CARDIO:  - SBP goal normotensive     PULM:   - on RA  - encourage incentive spirometery     GI:  - NPO after midnight   - protonix 40mg home med, cont   - bowel regimen      RENAL:   - normonatremia goal   - IVL     ENDO:   - no active issues     ID:   - infectious work-up pending; f/u blood cultures and urine culture from admission   - cont Keflex 500mg TID   - no ID consult    HEME:   - no active issues   - SCDs b/l LE DVT ppx     Dispo:   full code, dispo pend OR plan     Assessment and plan discussed with Dr. Price     Assessment:  Present when checked    []  GCS  E   V  M     Heart Failure: []Acute, [] acute on chronic , []chronic  Heart Failure:  [] Diastolic (HFpEF), [] Systolic (HFrEF), []Combined (HFpEF and HFrEF), [] RHF, [] Pulm HTN, [] Other    [] KANWAL, [] ATN, [] AIN, [] other  [] CKD1, [] CKD2, [] CKD 3, [] CKD 4, [] CKD 5, []ESRD    Encephalopathy: [] Metabolic, [] Hepatic, [] toxic, [] Neurological, [] Other    Abnormal Nurtitional Status: [] malnurtition (see nutrition note), [ ]underweight: BMI < 19, [] morbid obesity: BMI >40, [] Cachexia    [] Sepsis  [] hypovolemic shock,[] cardiogenic shock, [] hemorrhagic shock, [] neuogenic shock  [] Acute Respiratory Failure  []Cerebral edema, [] Brain compression/ herniation,   [] Functional quadriplegia  [] Acute blood loss anemia   Surgery: Repair of suboccipital wound   Consent: Signed by patient     S: Pt has been advised on the risks and benefits of surgery and verbalizes understanding of risks/benefits.     No Known Allergies      OVERNIGHT EVENTS:  1/29: Admitted. Pre-op for Wednesday.   1/30: Pending pre-op labs and medical clearance.     T(C): 36.9 (01-30-24 @ 09:19), Max: 37 (01-29-24 @ 13:27)  HR: 69 (01-30-24 @ 09:19) (69 - 88)  BP: 106/59 (01-30-24 @ 09:19) (99/72 - 116/76)  RR: 17 (01-30-24 @ 09:19) (16 - 18)  SpO2: 98% (01-30-24 @ 09:19) (97% - 100%)  Wt(kg): --    EXAM:  Constitutional: 44 y/o female awake, alert in no acute distress.  Eyes:  Sclera anicteric, conjunctiva noninjected.   ENMT: Oropharyngeal mucosa moist, pink. Tongue midline.    Respiratory: Clear to auscultation bilaterally.  No rales, rhonchi, wheezes.  Cardiovascular: Regular rate and rhythm.  S1, S2 heard.  Gastrointestinal:  Soft, nontender, nondistended.  +BS.  Vascular: Extremities warm, no ulcers, no discoloration of skin.   Neurological: Gen: AA&O x 3, conversant, appropriate. CN II-XII grossly intact. SAM x 4, 5/5 throughout UE/LE. Sensation intact to light touch throughout. DTRs: 2+ symmetric throughout. Hoffmans negative bilaterally.  Plantar downgoing bilaterally.  No clonus.   Skin: Warm, dry, there is some errythema present at subocciptal crani site. No drainage or pus.       01-30    137  |  103  |  20  ----------------------------<  88  3.9   |  24  |  0.61    Ca    9.3      30 Jan 2024 05:30  Phos  4.6     01-30  Mg     2.1     01-30      CBC Full  -  ( 30 Jan 2024 05:30 )  WBC Count : 5.85 K/uL  RBC Count : 4.12 M/uL  Hemoglobin : 12.0 g/dL  Hematocrit : 35.8 %  Platelet Count - Automated : 254 K/uL  Mean Cell Volume : 86.9 fl  Mean Cell Hemoglobin : 29.1 pg  Mean Cell Hemoglobin Concentration : 33.5 gm/dL  Auto Neutrophil # : x  Auto Lymphocyte # : x  Auto Monocyte # : x  Auto Eosinophil # : x  Auto Basophil # : x  Auto Neutrophil % : x  Auto Lymphocyte % : x  Auto Monocyte % : x  Auto Eosinophil % : x  Auto Basophil % : x    PT/INR - ( 29 Jan 2024 13:43 )   PT: 11.6 sec;   INR: 1.02          PTT - ( 29 Jan 2024 13:43 )  PTT:34.7 sec    Pregnancy test: negative   Type & Screen (in past 72hrs): resulted in chart (Type A)     ECHO: NA  Medical Clearances: Medically cleared for OR per Dr. Roy       Last dose of antiplatelet/anticoagulation drug: NA     Implanted Devices (pacemaker, drug pump...etc):  []YES   [x] NO                  If yes --> EPS consulted to interrogate/adjust device:    3M nasal swab ordered?  x_Y  _N  Cranial surgery: Order written for hair to be shampooed night before surgery  [] yes   []no                 Assessment: 44 y/o female PMHx Chiari Malformation (s/p SOC, C1 laminectomy with duraplasty on 12/14/23), chronic headaches, presents to [facility] ED c/o SOC incisional clear drainage x 5 days, CTH showing 6.3 m fluid collection plan for OR tomorrow for repair of suboccipital wound.    Plan:  NEURO:   - neuro and vitals checks q4hrs   - reigonal bed   - CTH with IV contrast 1/29:  6.3 cm fluid collection containing air   overlying the craniectomy site which may represent postsurgical   changes/pseudomeningocele versus infection  - cont home tramadol and tylenol PRN for headache   - OR tomorrow 1/30 for repair of suboccipital wound, obtain medical clearance, PT/PTT/INR, T&S, NPO after midnight     CARDIO:  - SBP goal normotensive     PULM:   - on RA  - encourage incentive spirometery     GI:  - NPO after midnight   - protonix 40mg home med, cont   - bowel regimen      RENAL:   - normonatremia goal   - IVL     ENDO:   - no active issues     ID:   - infectious work-up pending; f/u blood cultures and urine culture from admission   - cont Keflex 500mg TID   - no ID consult    HEME:   - no active issues   - SCDs b/l LE DVT ppx     Dispo:   full code, dispo pend OR plan     Assessment and plan discussed with Dr. Price     Assessment:  Present when checked    []  GCS  E   V  M     Heart Failure: []Acute, [] acute on chronic , []chronic  Heart Failure:  [] Diastolic (HFpEF), [] Systolic (HFrEF), []Combined (HFpEF and HFrEF), [] RHF, [] Pulm HTN, [] Other    [] KANWAL, [] ATN, [] AIN, [] other  [] CKD1, [] CKD2, [] CKD 3, [] CKD 4, [] CKD 5, []ESRD    Encephalopathy: [] Metabolic, [] Hepatic, [] toxic, [] Neurological, [] Other    Abnormal Nurtitional Status: [] malnurtition (see nutrition note), [ ]underweight: BMI < 19, [] morbid obesity: BMI >40, [] Cachexia    [] Sepsis  [] hypovolemic shock,[] cardiogenic shock, [] hemorrhagic shock, [] neuogenic shock  [] Acute Respiratory Failure  []Cerebral edema, [] Brain compression/ herniation,   [] Functional quadriplegia  [] Acute blood loss anemia

## 2024-01-30 NOTE — PHYSICAL THERAPY INITIAL EVALUATION ADULT - PERTINENT HX OF CURRENT PROBLEM, REHAB EVAL
44yo F PMHx Chiari Malformation (s/p SOC, C1 laminectomy with duraplasty on 12/14/23), chronic headaches, presents to [facility] ED c/o SOC incisional clear drainage x 5 days. Pt started on Keflex 500mg TID, incision culture sent, and sutures placed in office on 1/25 for incisional drainage. Pt admitted to [facility] for infectious work-up and OR planning for wound revision and possible washout this week with Dr. Price.

## 2024-01-30 NOTE — OCCUPATIONAL THERAPY INITIAL EVALUATION ADULT - ADDITIONAL COMMENTS
Pt lives w/ family in an elevator access apt building w/ no stairs to enter. Pt was independent prior to admission w/o prior use of AEs/DMEs.

## 2024-01-30 NOTE — CONSULT NOTE ADULT - SUBJECTIVE AND OBJECTIVE BOX
HPI: 43 YOF with PMH Of Chiari malformation s/p suboccipital craniotomy and C1 laminectomy (12/14/23 with Dr. Price) who was noted to have drainage from incision on post-op checks admitted for washout/revision of wound. Denies fever, vision changes, hearing changes, rhinorrhea, sore throat, chest pain, palpitations, cough, SOB, abd pain, N/V/D, dysuria, hematuria, rash, numbness/weakness/tingling, LOC. Reports have recent surgery she developed a skin rash (now resolved) but unclear from what - no other issues with anesthesia.     ROS: negative except as per HPI    PMH: as per HPI  PSH: as per HPI  Medications: reviewed  Allergies: reviewed  SH:  FH:    Diet, NPO after Midnight:      NPO Start Date: 30-Jan-2024,   NPO Start Time: 23:59 (01-30-24 @ 07:43) [Active]  Diet, Regular (01-30-24 @ 07:41) [Active]      MEDICATIONS:  MEDICATIONS  (STANDING):  bisacodyl 5 milliGRAM(s) Oral at bedtime  cephalexin 500 milliGRAM(s) Oral three times a day  influenza   Vaccine 0.5 milliLiter(s) IntraMuscular once  pantoprazole    Tablet 40 milliGRAM(s) Oral before breakfast  senna 2 Tablet(s) Oral at bedtime    MEDICATIONS  (PRN):  acetaminophen     Tablet .. 650 milliGRAM(s) Oral every 6 hours PRN Temp greater or equal to 38C (100.4F), Mild Pain (1 - 3)  ondansetron Injectable 4 milliGRAM(s) IV Push every 6 hours PRN Nausea and/or Vomiting  traMADol 50 milliGRAM(s) Oral every 6 hours PRN Severe Pain (7 - 10)      Allergies    No Known Allergies    Intolerances        OBJECTIVE:  Vital Signs Last 24 Hrs  T(C): 36.9 (30 Jan 2024 09:19), Max: 37 (29 Jan 2024 13:27)  T(F): 98.4 (30 Jan 2024 09:19), Max: 98.6 (29 Jan 2024 13:27)  HR: 69 (30 Jan 2024 09:19) (69 - 88)  BP: 106/59 (30 Jan 2024 09:19) (99/72 - 116/76)  BP(mean): --  RR: 17 (30 Jan 2024 09:19) (16 - 18)  SpO2: 98% (30 Jan 2024 09:19) (97% - 100%)    Parameters below as of 30 Jan 2024 09:19  Patient On (Oxygen Delivery Method): room air      I&O's Summary      PHYSICAL EXAM:  Gen: appears stated age, resting comfortably, NAD  HEENT: NCAT, MMM  Neck: supple  CV: RRR, no m/r/g, peripheral pulses 2+  Pulm: CTAB, no increased work of breathing, no rales/rhonchi  Abd: soft, ND, NT, no rebound or guarding  Skin: warm and dry  Ext: non-tender, no edema  Neuro: AOx3, speaking in full sentences  Psych: affect and behavior appropriate, pleasant at time of interview    LABS:                        12.0   5.85  )-----------( 254      ( 30 Jan 2024 05:30 )             35.8     01-30    137  |  103  |  20  ----------------------------<  88  3.9   |  24  |  0.61    Ca    9.3      30 Jan 2024 05:30  Phos  4.6     01-30  Mg     2.1     01-30        PT/INR - ( 29 Jan 2024 13:43 )   PT: 11.6 sec;   INR: 1.02          PTT - ( 29 Jan 2024 13:43 )  PTT:34.7 sec  CAPILLARY BLOOD GLUCOSE        Urinalysis Basic - ( 30 Jan 2024 05:30 )    Color: x / Appearance: x / SG: x / pH: x  Gluc: 88 mg/dL / Ketone: x  / Bili: x / Urobili: x   Blood: x / Protein: x / Nitrite: x   Leuk Esterase: x / RBC: x / WBC x   Sq Epi: x / Non Sq Epi: x / Bacteria: x        MICRODATA:    Culture - Blood (collected 29 Jan 2024 17:42)  Source: .Blood Blood  Preliminary Report (30 Jan 2024 08:00):    No growth at 12 hours    Culture - Blood (collected 29 Jan 2024 17:42)  Source: .Blood Blood  Preliminary Report (30 Jan 2024 08:00):    No growth at 12 hours    Urinalysis with Rflx Culture (collected 29 Jan 2024 15:00)        RADIOLOGY/OTHER STUDIES:

## 2024-01-30 NOTE — HISTORY OF PRESENT ILLNESS
[FreeTextEntry1] : 42 year old woman with history of Chiari malformation who is well known to our practice. She initially established care with Dr. Price in 2018 due to findings of Chiari malformation and arachnoid cyst. Due to severe headaches consistent with increased pressure, suboccipital decompression was recommended but she was unable to proceed with surgery at the time.  She returns today due to worsening symptoms. She states headaches are becoming more frequent. Headaches are worsened by coughing, laughing, bending, sneezing. Denies changes in vision. Reports intermittent numbness/tingling bilateral legs. She also reports imbalance.   Denies visual changes, nausea, vomiting, dizziness.

## 2024-01-30 NOTE — PHYSICAL EXAM
[General Appearance - Alert] : alert [General Appearance - In No Acute Distress] : in no acute distress [Serous] : exhibiting serous drainage [Normal Skin] : normal [Oriented To Time, Place, And Person] : oriented to person, place, and time [Motor Tone] : muscle tone was normal in all four extremities [Motor Strength] : muscle strength was normal in all four extremities [Sclera] : the sclera and conjunctiva were normal [Outer Ear] : the ears and nose were normal in appearance [Neck Appearance] : the appearance of the neck was normal [] : no respiratory distress [Skin Color & Pigmentation] : normal skin color and pigmentation

## 2024-01-30 NOTE — PROGRESS NOTE ADULT - SUBJECTIVE AND OBJECTIVE BOX
HPI:  44yo F PMHx Chiari Malformation (s/p SOC, C1 laminectomy with duraplasty on 23), chronic headaches, presents to [facility] ED c/o SOC incisional clear drainage x 5 days. Per pt 24 patient woke from sleep and noted her pillow was saturated with clear fluid. Pt was seen in the office by Dr. Price that day who cleaned the wound and removed an area of scabbing from the superior portion of the incision. After the scab was removed, there was clear fluid leaking from the incision which was sent for culture (NGTD), and 6 non-absorbable sutures were placed to close the area of wound dehiscence. Pt was sent home with a prescription for Keflex 500mg TID. Per pt, she then again noticed clear fluid leaking from her incision on , and pt was again seen in the office by Dr. Price today who recommended pt be evaluated in the ED for CT with IV contrast and infectious work-up. Pt reports she has mostly incisional pain with some associated b/l temporal headache for which she takes tramadol PRN with some relief. Pt denies photophobia, dizziness, fainting/LOC, meningismus, N/V, fever/chills, recent travel or known sick contact.  (2024 15:34)    OVERNIGHT EVENTS: [facility] Course:  : Admitted. Pre-op for Wednesday.   : Pending pre-op labs and medical clearance.     Vital Signs Last 24 Hrs  T(C): 36.9 (2024 19:21), Max: 37 (2024 13:27)  T(F): 98.5 (2024 19:21), Max: 98.6 (2024 13:27)  HR: 78 (2024 19:21) (76 - 88)  BP: 110/71 (2024 19:21) (108/68 - 116/76)  BP(mean): --  RR: 17 (2024 19:21) (16 - 18)  SpO2: 97% (2024 19:21) (97% - 100%)    Parameters below as of 2024 19:21  Patient On (Oxygen Delivery Method): room air        I&O's Summary      PHYSICAL EXAM:  Constitutional: NAD, resting comfortably in bed.   HEENT: Pupils equal, round, reactive to light.   Respiratory: No respiratory distress, lungs clear to auscultation bilaterally.   Cardiovascuar: RRR, S1, S2.   Gastrointestinal: Abdomen soft, non tender, nondistended.  Neurological:  AAOX3. Opens eyes. Follows commands. Speech clear.  Cranial Nerves: II-XII intact  Motor: 5/5 power in b/l UE and LE  Sensation: intact to light touch in all extremities  Pronator Drift: none  Dysmetria: none  Extremities: Warm, well perfused.  Wounds: +SOC crani incision with 6 non-absorbable sutures at superior portion of incision    TUBES/LINES:  [] Clemons  [] Lumbar Drain  [] Wound Drains  [] Others      DIET:  [] NPO  [x] Mechanical  [] Tube feeds    LABS:                        12.1   5.98  )-----------( 254      ( 2024 13:43 )             35.7         136  |  102  |  18  ----------------------------<  101<H>  4.0   |  23  |  0.58    Ca    9.8      2024 13:43      PT/INR - ( 2024 13:43 )   PT: 11.6 sec;   INR: 1.02          PTT - ( 2024 13:43 )  PTT:34.7 sec  Urinalysis Basic - ( 2024 15:00 )    Color: Yellow / Appearance: Clear / S.024 / pH: x  Gluc: x / Ketone: Negative mg/dL  / Bili: Negative / Urobili: 0.2 mg/dL   Blood: x / Protein: Negative mg/dL / Nitrite: Negative   Leuk Esterase: Negative / RBC: 7 /HPF / WBC 1 /HPF   Sq Epi: x / Non Sq Epi: 1 /HPF / Bacteria: Negative /HPF          CAPILLARY BLOOD GLUCOSE          Drug Levels: [] N/A    CSF Analysis: [] N/A      Allergies    No Known Allergies    Intolerances      MEDICATIONS:  Antibiotics:  cephalexin 500 milliGRAM(s) Oral three times a day    Neuro:  acetaminophen     Tablet .. 650 milliGRAM(s) Oral every 6 hours PRN  ondansetron Injectable 4 milliGRAM(s) IV Push every 6 hours PRN  traMADol 50 milliGRAM(s) Oral every 6 hours PRN    Anticoagulation:    OTHER:  bisacodyl 5 milliGRAM(s) Oral at bedtime  pantoprazole    Tablet 40 milliGRAM(s) Oral before breakfast  senna 2 Tablet(s) Oral at bedtime    IVF:    CULTURES:    RADIOLOGY & ADDITIONAL TESTS:      ASSESSMENT:  44yo F PMHx Chiari Malformation (s/p SOC, C1 laminectomy with duraplasty on 23), chronic headaches, presents to [facility] ED c/o SOC incisional clear drainage x 5 days. Pt started on Keflex 500mg TID, incision culture sent, and sutures placed in office on  for incisional drainage. Pt admitted to [facility] for infectious work-up and OR planning for wound revision and possible washout this week with Dr. Price.     HISTORY OF CHIARIMALFORMATION    No pertinent family history in first degree relatives    Handoff    MEWS Score    No pertinent past medical history    Arachnoid cyst    History of Chiari malformation    History of Chiari malformation    History of Chiari malformation    Draining postoperative wound    H/O breast surgery    History of Chiari malformation    Status post craniectomy    POST OP COMPLICATION    44    [email or web address]        PLAN:  NEURO:   - neuro and vitals checks q4hrs   - reigonal bed   - CTH with IV contrast :  6.3 cm fluid collection containing air   overlying the craniectomy site which may represent postsurgical   changes/pseudomeningocele versus infection  - cont home tramadol and tylenol PRN for headache   - pend OR planning     CARDIO:  - SBP goal normotensive     PULM:   - on RA  - encourage incentive spirometery     GI:  - regular diet   - protonix 40mg home med, cont   - bowel regimen      RENAL:   - normonatremia goal   - IVL     ENDO:   - no active issues     ID:   - infectious work-up pending; f/u blood cultures and urine culture from admission   - cont Keflex 500mg TID     HEME:   - no active issues   - SCDs b/l LE DVT ppx     Dispo:   full code, dispo pend OR plan     Assessment and plan discussed with Dr. Price       []  GCS  E   V  M     Heart Failure: []Acute, [] acute on chronic , []chronic  Heart Failure:  [] Diastolic (HFpEF), [] Systolic (HFrEF), []Combined (HFpEF and HFrEF), [] RHF, [] Pulm HTN, [] Other    [] KANWAL, [] ATN, [] AIN, [] other  [] CKD1, [] CKD2, [] CKD 3, [] CKD 4, [] CKD 5, []ESRD    Encephalopathy: [] Metabolic, [] Hepatic, [] toxic, [] Neurological, [] Other    Abnormal Nurtitional Status: [] malnurtition (see nutrition note), [ ]underweight: BMI < 19, [] morbid obesity: BMI >40, [] Cachexia    [] Sepsis  [] hypovolemic shock,[] cardiogenic shock, [] hemorrhagic shock, [] neuogenic shock  [] Acute Respiratory Failure  []Cerebral edema, [] Brain compression/ herniation,   [] Functional quadriplegia  [] Acute blood loss anemia

## 2024-01-30 NOTE — ASSESSMENT
[FreeTextEntry1] : Cultures from last week - no growth to date Due to continuous serous drainage, recommend suboccipital wound washout. Risks, benefits and alternatives discussed with patient.  Patient understands and wishes to proceed with planned surgery. PST packet reviewed with patient. She was brought to ED for urgent admission for suboccipital wound washout  Patient and patient's family verbalize agreement and understanding with plan of care.  I, Dr. Price, personally performed the evaluation and management (E/M) services for this established patient who presents today with (a) new problem(s)/exacerbation of (an) existing condition(s).  That E/M includes conducting the examination, assessing all new/exacerbated conditions, and establishing a new plan of care.  Today, my ACP, Claudia Reddy, was here to observe my evaluation and management services for this new problem/exacerbated condition to be followed going forward.

## 2024-01-31 LAB
ANION GAP SERPL CALC-SCNC: 10 MMOL/L — SIGNIFICANT CHANGE UP (ref 5–17)
APTT BLD: 35 SEC — SIGNIFICANT CHANGE UP (ref 24.5–35.6)
BLD GP AB SCN SERPL QL: POSITIVE — SIGNIFICANT CHANGE UP
BUN SERPL-MCNC: 16 MG/DL — SIGNIFICANT CHANGE UP (ref 7–23)
CALCIUM SERPL-MCNC: 9 MG/DL — SIGNIFICANT CHANGE UP (ref 8.4–10.5)
CHLORIDE SERPL-SCNC: 104 MMOL/L — SIGNIFICANT CHANGE UP (ref 96–108)
CO2 SERPL-SCNC: 22 MMOL/L — SIGNIFICANT CHANGE UP (ref 22–31)
CREAT SERPL-MCNC: 0.57 MG/DL — SIGNIFICANT CHANGE UP (ref 0.5–1.3)
EGFR: 117 ML/MIN/1.73M2 — SIGNIFICANT CHANGE UP
GLUCOSE SERPL-MCNC: 95 MG/DL — SIGNIFICANT CHANGE UP (ref 70–99)
GRAM STN FLD: SIGNIFICANT CHANGE UP
HCT VFR BLD CALC: 36 % — SIGNIFICANT CHANGE UP (ref 34.5–45)
HGB BLD-MCNC: 12.1 G/DL — SIGNIFICANT CHANGE UP (ref 11.5–15.5)
INR BLD: 1.06 — SIGNIFICANT CHANGE UP (ref 0.85–1.18)
MAGNESIUM SERPL-MCNC: 2 MG/DL — SIGNIFICANT CHANGE UP (ref 1.6–2.6)
MCHC RBC-ENTMCNC: 29.5 PG — SIGNIFICANT CHANGE UP (ref 27–34)
MCHC RBC-ENTMCNC: 33.6 GM/DL — SIGNIFICANT CHANGE UP (ref 32–36)
MCV RBC AUTO: 87.8 FL — SIGNIFICANT CHANGE UP (ref 80–100)
NRBC # BLD: 0 /100 WBCS — SIGNIFICANT CHANGE UP (ref 0–0)
PHOSPHATE SERPL-MCNC: 3.9 MG/DL — SIGNIFICANT CHANGE UP (ref 2.5–4.5)
PLATELET # BLD AUTO: 244 K/UL — SIGNIFICANT CHANGE UP (ref 150–400)
POTASSIUM SERPL-MCNC: 3.8 MMOL/L — SIGNIFICANT CHANGE UP (ref 3.5–5.3)
POTASSIUM SERPL-SCNC: 3.8 MMOL/L — SIGNIFICANT CHANGE UP (ref 3.5–5.3)
PROTHROM AB SERPL-ACNC: 12.1 SEC — SIGNIFICANT CHANGE UP (ref 9.5–13)
RBC # BLD: 4.1 M/UL — SIGNIFICANT CHANGE UP (ref 3.8–5.2)
RBC # FLD: 13.1 % — SIGNIFICANT CHANGE UP (ref 10.3–14.5)
RH IG SCN BLD-IMP: NEGATIVE — SIGNIFICANT CHANGE UP
SODIUM SERPL-SCNC: 136 MMOL/L — SIGNIFICANT CHANGE UP (ref 135–145)
SPECIMEN SOURCE: SIGNIFICANT CHANGE UP
WBC # BLD: 6.28 K/UL — SIGNIFICANT CHANGE UP (ref 3.8–10.5)
WBC # FLD AUTO: 6.28 K/UL — SIGNIFICANT CHANGE UP (ref 3.8–10.5)

## 2024-01-31 PROCEDURE — 61321 CRNEC/CRNOT DRG ICR ABS ITTL: CPT | Mod: 78

## 2024-01-31 PROCEDURE — 99232 SBSQ HOSP IP/OBS MODERATE 35: CPT

## 2024-01-31 PROCEDURE — 99024 POSTOP FOLLOW-UP VISIT: CPT

## 2024-01-31 DEVICE — SURGIFLO HEMOSTATIC MATRIX KIT: Type: IMPLANTABLE DEVICE | Status: FUNCTIONAL

## 2024-01-31 DEVICE — SURGIFOAM PAD 8CM X 12.5CM X 10MM (100): Type: IMPLANTABLE DEVICE | Status: FUNCTIONAL

## 2024-01-31 RX ORDER — SENNA PLUS 8.6 MG/1
2 TABLET ORAL AT BEDTIME
Refills: 0 | Status: DISCONTINUED | OUTPATIENT
Start: 2024-01-31 | End: 2024-02-02

## 2024-01-31 RX ORDER — ACETAMINOPHEN 500 MG
650 TABLET ORAL EVERY 6 HOURS
Refills: 0 | Status: DISCONTINUED | OUTPATIENT
Start: 2024-01-31 | End: 2024-02-01

## 2024-01-31 RX ORDER — SODIUM CHLORIDE 9 MG/ML
1000 INJECTION INTRAMUSCULAR; INTRAVENOUS; SUBCUTANEOUS
Refills: 0 | Status: DISCONTINUED | OUTPATIENT
Start: 2024-01-31 | End: 2024-02-01

## 2024-01-31 RX ORDER — ONDANSETRON 8 MG/1
4 TABLET, FILM COATED ORAL EVERY 6 HOURS
Refills: 0 | Status: DISCONTINUED | OUTPATIENT
Start: 2024-01-31 | End: 2024-02-02

## 2024-01-31 RX ORDER — HYDROMORPHONE HYDROCHLORIDE 2 MG/ML
0.5 INJECTION INTRAMUSCULAR; INTRAVENOUS; SUBCUTANEOUS
Refills: 0 | Status: DISCONTINUED | OUTPATIENT
Start: 2024-01-31 | End: 2024-01-31

## 2024-01-31 RX ORDER — CEFAZOLIN SODIUM 1 G
2000 VIAL (EA) INJECTION EVERY 8 HOURS
Refills: 0 | Status: DISCONTINUED | OUTPATIENT
Start: 2024-01-31 | End: 2024-02-02

## 2024-01-31 RX ORDER — TRAMADOL HYDROCHLORIDE 50 MG/1
50 TABLET ORAL EVERY 6 HOURS
Refills: 0 | Status: DISCONTINUED | OUTPATIENT
Start: 2024-01-31 | End: 2024-02-01

## 2024-01-31 RX ORDER — PANTOPRAZOLE SODIUM 20 MG/1
40 TABLET, DELAYED RELEASE ORAL
Refills: 0 | Status: DISCONTINUED | OUTPATIENT
Start: 2024-01-31 | End: 2024-02-02

## 2024-01-31 RX ORDER — HYDROMORPHONE HYDROCHLORIDE 2 MG/ML
0.5 INJECTION INTRAMUSCULAR; INTRAVENOUS; SUBCUTANEOUS ONCE
Refills: 0 | Status: DISCONTINUED | OUTPATIENT
Start: 2024-01-31 | End: 2024-01-31

## 2024-01-31 RX ORDER — CEFAZOLIN SODIUM 1 G
2000 VIAL (EA) INJECTION ONCE
Refills: 0 | Status: DISCONTINUED | OUTPATIENT
Start: 2024-01-31 | End: 2024-01-31

## 2024-01-31 RX ADMIN — HYDROMORPHONE HYDROCHLORIDE 0.5 MILLIGRAM(S): 2 INJECTION INTRAMUSCULAR; INTRAVENOUS; SUBCUTANEOUS at 19:00

## 2024-01-31 RX ADMIN — HYDROMORPHONE HYDROCHLORIDE 0.5 MILLIGRAM(S): 2 INJECTION INTRAMUSCULAR; INTRAVENOUS; SUBCUTANEOUS at 16:39

## 2024-01-31 RX ADMIN — Medication 500 MILLIGRAM(S): at 05:26

## 2024-01-31 RX ADMIN — Medication 650 MILLIGRAM(S): at 10:04

## 2024-01-31 RX ADMIN — Medication 5 MILLIGRAM(S): at 21:34

## 2024-01-31 RX ADMIN — TRAMADOL HYDROCHLORIDE 50 MILLIGRAM(S): 50 TABLET ORAL at 21:01

## 2024-01-31 RX ADMIN — SODIUM CHLORIDE 75 MILLILITER(S): 9 INJECTION INTRAMUSCULAR; INTRAVENOUS; SUBCUTANEOUS at 00:10

## 2024-01-31 RX ADMIN — TRAMADOL HYDROCHLORIDE 50 MILLIGRAM(S): 50 TABLET ORAL at 22:00

## 2024-01-31 RX ADMIN — CHLORHEXIDINE GLUCONATE 1 APPLICATION(S): 213 SOLUTION TOPICAL at 11:34

## 2024-01-31 RX ADMIN — HYDROMORPHONE HYDROCHLORIDE 0.5 MILLIGRAM(S): 2 INJECTION INTRAMUSCULAR; INTRAVENOUS; SUBCUTANEOUS at 16:09

## 2024-01-31 RX ADMIN — HYDROMORPHONE HYDROCHLORIDE 0.5 MILLIGRAM(S): 2 INJECTION INTRAMUSCULAR; INTRAVENOUS; SUBCUTANEOUS at 18:36

## 2024-01-31 RX ADMIN — PANTOPRAZOLE SODIUM 40 MILLIGRAM(S): 20 TABLET, DELAYED RELEASE ORAL at 05:26

## 2024-01-31 RX ADMIN — SENNA PLUS 2 TABLET(S): 8.6 TABLET ORAL at 21:34

## 2024-01-31 RX ADMIN — Medication 650 MILLIGRAM(S): at 11:04

## 2024-01-31 RX ADMIN — Medication 100 MILLIGRAM(S): at 21:34

## 2024-01-31 RX ADMIN — HYDROMORPHONE HYDROCHLORIDE 0.5 MILLIGRAM(S): 2 INJECTION INTRAMUSCULAR; INTRAVENOUS; SUBCUTANEOUS at 18:15

## 2024-01-31 RX ADMIN — HYDROMORPHONE HYDROCHLORIDE 0.5 MILLIGRAM(S): 2 INJECTION INTRAMUSCULAR; INTRAVENOUS; SUBCUTANEOUS at 17:31

## 2024-01-31 NOTE — PROGRESS NOTE ADULT - SUBJECTIVE AND OBJECTIVE BOX
SUBJECTIVE: NAEON. Patient states she feels tired this morning - has frontal headache as well as pain at the back of her head.     MEDICATIONS:  MEDICATIONS  (STANDING):  bisacodyl 5 milliGRAM(s) Oral at bedtime  cephalexin 500 milliGRAM(s) Oral three times a day  chlorhexidine 2% Cloths 1 Application(s) Topical once  influenza   Vaccine 0.5 milliLiter(s) IntraMuscular once  pantoprazole    Tablet 40 milliGRAM(s) Oral before breakfast  povidone iodine 5% Nasal Swab 1 Application(s) Both Nostrils once  senna 2 Tablet(s) Oral at bedtime  sodium chloride 0.9%. 1000 milliLiter(s) (75 mL/Hr) IV Continuous <Continuous>    MEDICATIONS  (PRN):  acetaminophen     Tablet .. 650 milliGRAM(s) Oral every 6 hours PRN Temp greater or equal to 38C (100.4F), Mild Pain (1 - 3)  ondansetron Injectable 4 milliGRAM(s) IV Push every 6 hours PRN Nausea and/or Vomiting  traMADol 50 milliGRAM(s) Oral every 6 hours PRN Severe Pain (7 - 10)      Allergies    No Known Allergies    Intolerances        OBJECTIVE:  Vital Signs Last 24 Hrs  T(C): 37.1 (31 Jan 2024 08:39), Max: 37.2 (30 Jan 2024 21:30)  T(F): 98.8 (31 Jan 2024 08:39), Max: 98.9 (30 Jan 2024 21:30)  HR: 74 (31 Jan 2024 08:39) (74 - 86)  BP: 109/65 (31 Jan 2024 08:40) (94/54 - 122/80)  BP(mean): --  RR: 15 (31 Jan 2024 08:39) (15 - 18)  SpO2: 97% (31 Jan 2024 08:39) (97% - 100%)    Parameters below as of 31 Jan 2024 08:39  Patient On (Oxygen Delivery Method): room air      I&O's Summary    30 Jan 2024 07:01  -  31 Jan 2024 07:00  --------------------------------------------------------  IN: 720 mL / OUT: 0 mL / NET: 720 mL        PHYSICAL EXAM:  Gen: appears stated age, resting comfortably, NAD  HEENT: NCAT, MMM  Neck: supple  CV: RRR, no m/r/g, peripheral pulses 2+  Pulm: CTAB, no increased work of breathing, no rales/rhonchi  Abd: soft, ND, NT, no rebound or guarding  Skin: warm and dry  Ext: non-tender, no edema  Neuro: AOx3, speaking in full sentences  Psych: affect and behavior appropriate, pleasant at time of interview    LABS:                        12.1   6.28  )-----------( 244      ( 31 Jan 2024 05:30 )             36.0     01-31    136  |  104  |  16  ----------------------------<  95  3.8   |  22  |  0.57    Ca    9.0      31 Jan 2024 05:30  Phos  3.9     01-31  Mg     2.0     01-31        PT/INR - ( 31 Jan 2024 05:30 )   PT: 12.1 sec;   INR: 1.06          PTT - ( 31 Jan 2024 05:30 )  PTT:35.0 sec  CAPILLARY BLOOD GLUCOSE        Urinalysis Basic - ( 31 Jan 2024 05:30 )    Color: x / Appearance: x / SG: x / pH: x  Gluc: 95 mg/dL / Ketone: x  / Bili: x / Urobili: x   Blood: x / Protein: x / Nitrite: x   Leuk Esterase: x / RBC: x / WBC x   Sq Epi: x / Non Sq Epi: x / Bacteria: x        MICRODATA:    Culture - Blood (collected 29 Jan 2024 17:42)  Source: .Blood Blood  Preliminary Report (30 Jan 2024 20:00):    No growth at 1 day.    Culture - Blood (collected 29 Jan 2024 17:42)  Source: .Blood Blood  Preliminary Report (30 Jan 2024 20:00):    No growth at 1 day.    Urinalysis with Rflx Culture (collected 29 Jan 2024 15:00)        RADIOLOGY/OTHER STUDIES:

## 2024-01-31 NOTE — PROGRESS NOTE ADULT - SUBJECTIVE AND OBJECTIVE BOX
NEUROSURGERY POST OP NOTE:    POD# 0 S/P washout of suboccipital wound    S: 44 y/o F s/p washout of suboccipital wound seen and examined bedside in PACU endorsing incisional pain.       T(C): 37.1 (01-31-24 @ 13:15), Max: 37.2 (01-30-24 @ 21:30)  HR: 61 (01-31-24 @ 16:54) (61 - 86)  BP: 109/72 (01-31-24 @ 16:54) (94/54 - 117/86)  RR: 17 (01-31-24 @ 16:54) (8 - 18)  SpO2: 100% (01-31-24 @ 16:54) (97% - 100%)      01-30-24 @ 07:01  -  01-31-24 @ 07:00  --------------------------------------------------------  IN: 720 mL / OUT: 0 mL / NET: 720 mL    01-31-24 @ 07:01  -  01-31-24 @ 17:43  --------------------------------------------------------  IN: 0 mL / OUT: 600 mL / NET: -600 mL        acetaminophen     Tablet .. 650 milliGRAM(s) Oral every 6 hours PRN  bisacodyl 5 milliGRAM(s) Oral at bedtime  ceFAZolin   IVPB 2000 milliGRAM(s) IV Intermittent once  HYDROmorphone  Injectable 0.5 milliGRAM(s) IV Push every 15 minutes PRN  influenza   Vaccine 0.5 milliLiter(s) IntraMuscular once  ondansetron Injectable 4 milliGRAM(s) IV Push every 6 hours PRN  pantoprazole    Tablet 40 milliGRAM(s) Oral before breakfast  senna 2 Tablet(s) Oral at bedtime  traMADol 50 milliGRAM(s) Oral every 6 hours PRN        Exam:    PHYSICAL EXAM:  Constitutional: awake, alert, laying in bed  Respiratory: non-labored breathing. Normal chest rise.   Cardiovascular: Regular rate and rhythm  Gastrointestinal:  Soft, nontender, nondistended.  .  Vascular: Extremities warm, no ulcers, no discoloration of skin.   Neurological: Gen: AA&O x 3, conversant, appropriate.      CN II-XII grossly intact.    Motor: SAM x 4, 5/5 throughout UE/LE.    Sens: Sensation intact to light touch throughout.    Wound/incision:   suboccipital wound with OR dressing saturated in serosanguinous fluid      Assessment: 42yo F PMHx Chiari Malformation (s/p SOC, C1 laminectomy with duraplasty on 12/14/23), chronic headaches, presents to [facility] ED c/o SOC incisional clear drainage x 5 days. Pt started on Keflex 500mg TID, incision culture sent, and sutures placed in office on 1/25 for incisional drainage. Pt admitted to [facility] for infectious work-up and OR planning for wound revision and washout with Dr. Price.     NEURO:   - neuro and vitals checks q4hrs   - regional bed   - CTH with IV contrast 1/29: 6.3 cm fluid collection containing air overlying the craniectomy site which may represent postsurgical changes/pseudomeningocele versus infection  - cont home tramadol and tylenol PRN for headache, Dilaudid prn for severe pain        CARDIO:  - SBP goal normotensive     PULM:   - on RA  - encourage incentive spirometery     GI:  - ADAT   - protonix 40mg home med, cont   - bowel regimen      RENAL:   - normonatremia goal   - IVF while NPO    ENDO:   - no active issues     ID:   - infectious work-up pending; f/u blood cultures and urine culture from admission   - post op ancef   - no ID consult      HEME:   - no active issues   - SCDs b/l LE DVT ppx     Dispo:   full code, dispo pend OR plan       D/w Dr. Price  NEUROSURGERY POST OP NOTE:    POD# 0 S/P washout of suboccipital wound    S: 44 y/o F s/p washout of suboccipital wound seen and examined bedside in PACU endorsing incisional pain.       T(C): 37.1 (01-31-24 @ 13:15), Max: 37.2 (01-30-24 @ 21:30)  HR: 61 (01-31-24 @ 16:54) (61 - 86)  BP: 109/72 (01-31-24 @ 16:54) (94/54 - 117/86)  RR: 17 (01-31-24 @ 16:54) (8 - 18)  SpO2: 100% (01-31-24 @ 16:54) (97% - 100%)      01-30-24 @ 07:01  -  01-31-24 @ 07:00  --------------------------------------------------------  IN: 720 mL / OUT: 0 mL / NET: 720 mL    01-31-24 @ 07:01  -  01-31-24 @ 17:43  --------------------------------------------------------  IN: 0 mL / OUT: 600 mL / NET: -600 mL        acetaminophen     Tablet .. 650 milliGRAM(s) Oral every 6 hours PRN  bisacodyl 5 milliGRAM(s) Oral at bedtime  ceFAZolin   IVPB 2000 milliGRAM(s) IV Intermittent once  HYDROmorphone  Injectable 0.5 milliGRAM(s) IV Push every 15 minutes PRN  influenza   Vaccine 0.5 milliLiter(s) IntraMuscular once  ondansetron Injectable 4 milliGRAM(s) IV Push every 6 hours PRN  pantoprazole    Tablet 40 milliGRAM(s) Oral before breakfast  senna 2 Tablet(s) Oral at bedtime  traMADol 50 milliGRAM(s) Oral every 6 hours PRN        Exam:    PHYSICAL EXAM:  Constitutional: awake, alert, laying in bed  Respiratory: non-labored breathing. Normal chest rise.   Cardiovascular: Regular rate and rhythm  Gastrointestinal:  Soft, nontender, nondistended.  .  Vascular: Extremities warm, no ulcers, no discoloration of skin.   Neurological: Gen: AA&O x 3, conversant, appropriate.      CN II-XII grossly intact.    Motor: SAM x 4, 5/5 throughout UE/LE.    Sens: Sensation intact to light touch throughout.    Wound/incision:   suboccipital wound with OR dressing saturated in serosanguinous fluid      Assessment: 44yo F PMHx Chiari Malformation (s/p SOC, C1 laminectomy with duraplasty on 12/14/23), chronic headaches, presents to [facility] ED c/o SOC incisional clear drainage x 5 days. Pt started on Keflex 500mg TID, incision culture sent, and sutures placed in office on 1/25 for incisional drainage. Pt admitted to [facility] for infectious work-up and OR planning for wound revision and washout with Dr. Price.     NEURO:   - neuro and vitals checks q4hrs   - regional bed   - CTH with IV contrast 1/29: 6.3 cm fluid collection containing air overlying the craniectomy site which may represent postsurgical changes/pseudomeningocele versus infection  - cont home tramadol and tylenol PRN for headache, Dilaudid prn for severe pain        CARDIO:  - SBP goal normotensive     PULM:   - on RA  - encourage incentive spirometery     GI:  - ADAT   - protonix 40mg home med, cont   - bowel regimen      RENAL:   - normonatremia goal   - IVF while NPO    ENDO:   - no active issues     ID:   - infectious work-up pending; f/u blood cultures and urine culture from admission   - post op ancef   - no ID consult      HEME:   - no active issues   - SCDs b/l LE DVT ppx     Dispo:   full code, dispo pending PT/ OT eval       D/w Dr. Price  NEUROSURGERY POST OP NOTE:    POD# 0 S/P washout of suboccipital wound    S: 42 y/o F s/p washout of suboccipital wound seen and examined bedside in PACU endorsing incisional pain.       T(C): 37.1 (01-31-24 @ 13:15), Max: 37.2 (01-30-24 @ 21:30)  HR: 61 (01-31-24 @ 16:54) (61 - 86)  BP: 109/72 (01-31-24 @ 16:54) (94/54 - 117/86)  RR: 17 (01-31-24 @ 16:54) (8 - 18)  SpO2: 100% (01-31-24 @ 16:54) (97% - 100%)      01-30-24 @ 07:01  -  01-31-24 @ 07:00  --------------------------------------------------------  IN: 720 mL / OUT: 0 mL / NET: 720 mL    01-31-24 @ 07:01  -  01-31-24 @ 17:43  --------------------------------------------------------  IN: 0 mL / OUT: 600 mL / NET: -600 mL        acetaminophen     Tablet .. 650 milliGRAM(s) Oral every 6 hours PRN  bisacodyl 5 milliGRAM(s) Oral at bedtime  ceFAZolin   IVPB 2000 milliGRAM(s) IV Intermittent once  HYDROmorphone  Injectable 0.5 milliGRAM(s) IV Push every 15 minutes PRN  influenza   Vaccine 0.5 milliLiter(s) IntraMuscular once  ondansetron Injectable 4 milliGRAM(s) IV Push every 6 hours PRN  pantoprazole    Tablet 40 milliGRAM(s) Oral before breakfast  senna 2 Tablet(s) Oral at bedtime  traMADol 50 milliGRAM(s) Oral every 6 hours PRN      PHYSICAL EXAM:  Constitutional: awake, alert, laying in bed  Respiratory: non-labored breathing. Normal chest rise.   Cardiovascular: Regular rate and rhythm  Gastrointestinal:  Soft, nontender, nondistended.  .  Vascular: Extremities warm, no ulcers, no discoloration of skin.   Neurological: Gen: AA&O x 3, conversant, appropriate.      CN II-XII grossly intact.    Motor: SAM x 4, 5/5 throughout UE/LE.    Sens: Sensation intact to light touch throughout.    Wound/incision:   suboccipital wound with OR dressing saturated in serosanguinous fluid      Assessment: 44yo F PMHx Chiari Malformation (s/p SOC, C1 laminectomy with duraplasty on 12/14/23), chronic headaches, presents to [facility] ED c/o SOC incisional clear drainage x 5 days. Pt started on Keflex 500mg TID, incision culture sent, and sutures placed in office on 1/25 for incisional drainage. Pt admitted to [facility] for infectious work-up and OR planning for wound revision and washout with Dr. Price.     NEURO:   - neuro and vitals checks q4hrs   - regional bed   - CTH with IV contrast 1/29: 6.3 cm fluid collection containing air overlying the craniectomy site which may represent postsurgical changes/pseudomeningocele versus infection  - cont home tramadol and tylenol PRN for headache, Dilaudid prn for severe pain        CARDIO:  - SBP goal normotensive     PULM:   - on RA  - encourage incentive spirometery     GI:  - ADAT   - protonix 40mg home med, cont   - bowel regimen      RENAL:   - normonatremia goal   - IVF while NPO    ENDO:   - no active issues     ID:   - infectious work-up pending; f/u blood cultures and urine culture from admission   - post op ancef   - no ID consult      HEME:   - no active issues   - SCDs b/l LE DVT ppx     Dispo:   full code, dispo pending PT/ OT eval       D/w Dr. Price  NEUROSURGERY POST OP NOTE:    POD# 0 S/P washout of suboccipital wound    S: 44 y/o F s/p washout of suboccipital wound seen and examined bedside in PACU endorsing incisional pain.       T(C): 37.1 (01-31-24 @ 13:15), Max: 37.2 (01-30-24 @ 21:30)  HR: 61 (01-31-24 @ 16:54) (61 - 86)  BP: 109/72 (01-31-24 @ 16:54) (94/54 - 117/86)  RR: 17 (01-31-24 @ 16:54) (8 - 18)  SpO2: 100% (01-31-24 @ 16:54) (97% - 100%)      01-30-24 @ 07:01  -  01-31-24 @ 07:00  --------------------------------------------------------  IN: 720 mL / OUT: 0 mL / NET: 720 mL    01-31-24 @ 07:01  -  01-31-24 @ 17:43  --------------------------------------------------------  IN: 0 mL / OUT: 600 mL / NET: -600 mL        acetaminophen     Tablet .. 650 milliGRAM(s) Oral every 6 hours PRN  bisacodyl 5 milliGRAM(s) Oral at bedtime  ceFAZolin   IVPB 2000 milliGRAM(s) IV Intermittent once  HYDROmorphone  Injectable 0.5 milliGRAM(s) IV Push every 15 minutes PRN  influenza   Vaccine 0.5 milliLiter(s) IntraMuscular once  ondansetron Injectable 4 milliGRAM(s) IV Push every 6 hours PRN  pantoprazole    Tablet 40 milliGRAM(s) Oral before breakfast  senna 2 Tablet(s) Oral at bedtime  traMADol 50 milliGRAM(s) Oral every 6 hours PRN      PHYSICAL EXAM:  Constitutional: awake, alert, laying in bed  Respiratory: non-labored breathing. Normal chest rise.   Cardiovascular: Regular rate and rhythm  Gastrointestinal:  Soft, nontender, nondistended.  .  Vascular: Extremities warm, no ulcers, no discoloration of skin.   Neurological: Gen: AA&O x 3, conversant, appropriate.      CN II-XII grossly intact.    Motor: SAM x 4, 5/5 throughout UE/LE.    Sens: Sensation intact to light touch throughout.    Wound/incision:   suboccipital wound with OR dressing saturated in serosanguinous fluid      Assessment: 44yo F PMHx Chiari Malformation (s/p SOC, C1 laminectomy with duraplasty on 12/14/23), chronic headaches, presents to [facility] ED c/o SOC incisional clear drainage x 5 days. Pt started on Keflex 500mg TID, incision culture sent, and sutures placed in office on 1/25 for incisional drainage. Pt admitted to [facility] for infectious work-up now s/p wound revision and washout with Dr. Price.     NEURO:   - neuro and vitals checks q4hrs   - regional bed   - CTH with IV contrast 1/29: 6.3 cm fluid collection containing air overlying the craniectomy site which may represent postsurgical changes/pseudomeningocele versus infection  - cont home tramadol and tylenol PRN for headache, Dilaudid prn for severe pain        CARDIO:  - SBP goal normotensive     PULM:   - on RA  - encourage incentive spirometery     GI:  - ADAT   - protonix 40mg home med, cont   - bowel regimen      RENAL:   - normonatremia goal   - IVF while NPO    ENDO:   - no active issues     ID:   - infectious work-up pending; f/u blood cultures and urine culture from admission   - post op ancef   - no ID consult      HEME:   - no active issues   - SCDs b/l LE DVT ppx     Dispo:   full code, dispo pending PT/ OT eval       D/w Dr. Price

## 2024-01-31 NOTE — PROGRESS NOTE ADULT - ASSESSMENT
43 YOF with PMH Of class II obesity, Chiari malformation s/p suboccipital craniotomy and C1 laminectomy (12/14/23 with Dr. Price) who was noted to have drainage from incision on post-op checks admitted for washout/revision of wound.     Incisional wound drainage r/o SSI  Chiari malformation s/p crani and decompression  - management per primary  - CTH wtih 6.3 cm fluid collection containing air overlying the craniectomy site   - afebrile, no leukocytosis, BC NGTD  - tentative plan for OR today for washout and/or revision  - pain control with bowel regimen    Class II obesity  - recommend nutrition consult    Dispo: TBD    Recommendations and plan discussed with primary team.

## 2024-02-01 ENCOUNTER — TRANSCRIPTION ENCOUNTER (OUTPATIENT)
Age: 44
End: 2024-02-01

## 2024-02-01 LAB
ANION GAP SERPL CALC-SCNC: 6 MMOL/L — SIGNIFICANT CHANGE UP (ref 5–17)
BUN SERPL-MCNC: 11 MG/DL — SIGNIFICANT CHANGE UP (ref 7–23)
CALCIUM SERPL-MCNC: 8.9 MG/DL — SIGNIFICANT CHANGE UP (ref 8.4–10.5)
CHLORIDE SERPL-SCNC: 104 MMOL/L — SIGNIFICANT CHANGE UP (ref 96–108)
CO2 SERPL-SCNC: 25 MMOL/L — SIGNIFICANT CHANGE UP (ref 22–31)
CREAT SERPL-MCNC: 0.57 MG/DL — SIGNIFICANT CHANGE UP (ref 0.5–1.3)
EGFR: 117 ML/MIN/1.73M2 — SIGNIFICANT CHANGE UP
GLUCOSE SERPL-MCNC: 101 MG/DL — HIGH (ref 70–99)
HCT VFR BLD CALC: 33.8 % — LOW (ref 34.5–45)
HGB BLD-MCNC: 11.5 G/DL — SIGNIFICANT CHANGE UP (ref 11.5–15.5)
MAGNESIUM SERPL-MCNC: 2.1 MG/DL — SIGNIFICANT CHANGE UP (ref 1.6–2.6)
MCHC RBC-ENTMCNC: 29.9 PG — SIGNIFICANT CHANGE UP (ref 27–34)
MCHC RBC-ENTMCNC: 34 GM/DL — SIGNIFICANT CHANGE UP (ref 32–36)
MCV RBC AUTO: 88 FL — SIGNIFICANT CHANGE UP (ref 80–100)
NRBC # BLD: 0 /100 WBCS — SIGNIFICANT CHANGE UP (ref 0–0)
PHOSPHATE SERPL-MCNC: 3.4 MG/DL — SIGNIFICANT CHANGE UP (ref 2.5–4.5)
PLATELET # BLD AUTO: 217 K/UL — SIGNIFICANT CHANGE UP (ref 150–400)
POTASSIUM SERPL-MCNC: 3.7 MMOL/L — SIGNIFICANT CHANGE UP (ref 3.5–5.3)
POTASSIUM SERPL-SCNC: 3.7 MMOL/L — SIGNIFICANT CHANGE UP (ref 3.5–5.3)
RBC # BLD: 3.84 M/UL — SIGNIFICANT CHANGE UP (ref 3.8–5.2)
RBC # FLD: 13 % — SIGNIFICANT CHANGE UP (ref 10.3–14.5)
SODIUM SERPL-SCNC: 135 MMOL/L — SIGNIFICANT CHANGE UP (ref 135–145)
WBC # BLD: 6.31 K/UL — SIGNIFICANT CHANGE UP (ref 3.8–10.5)
WBC # FLD AUTO: 6.31 K/UL — SIGNIFICANT CHANGE UP (ref 3.8–10.5)

## 2024-02-01 PROCEDURE — 99232 SBSQ HOSP IP/OBS MODERATE 35: CPT

## 2024-02-01 RX ORDER — ACETAMINOPHEN 500 MG
650 TABLET ORAL EVERY 6 HOURS
Refills: 0 | Status: DISCONTINUED | OUTPATIENT
Start: 2024-02-01 | End: 2024-02-01

## 2024-02-01 RX ORDER — TRAMADOL HYDROCHLORIDE 50 MG/1
25 TABLET ORAL EVERY 4 HOURS
Refills: 0 | Status: DISCONTINUED | OUTPATIENT
Start: 2024-02-01 | End: 2024-02-01

## 2024-02-01 RX ORDER — POTASSIUM CHLORIDE 20 MEQ
40 PACKET (EA) ORAL ONCE
Refills: 0 | Status: COMPLETED | OUTPATIENT
Start: 2024-02-01 | End: 2024-02-01

## 2024-02-01 RX ORDER — ACETAMINOPHEN 500 MG
1000 TABLET ORAL EVERY 6 HOURS
Refills: 0 | Status: DISCONTINUED | OUTPATIENT
Start: 2024-02-01 | End: 2024-02-02

## 2024-02-01 RX ORDER — POLYETHYLENE GLYCOL 3350 17 G/17G
17 POWDER, FOR SOLUTION ORAL
Refills: 0 | Status: DISCONTINUED | OUTPATIENT
Start: 2024-02-01 | End: 2024-02-02

## 2024-02-01 RX ORDER — HYDROMORPHONE HYDROCHLORIDE 2 MG/ML
0.5 INJECTION INTRAMUSCULAR; INTRAVENOUS; SUBCUTANEOUS EVERY 4 HOURS
Refills: 0 | Status: DISCONTINUED | OUTPATIENT
Start: 2024-02-01 | End: 2024-02-01

## 2024-02-01 RX ORDER — TRAMADOL HYDROCHLORIDE 50 MG/1
50 TABLET ORAL EVERY 4 HOURS
Refills: 0 | Status: DISCONTINUED | OUTPATIENT
Start: 2024-02-01 | End: 2024-02-01

## 2024-02-01 RX ORDER — METHOCARBAMOL 500 MG/1
500 TABLET, FILM COATED ORAL THREE TIMES A DAY
Refills: 0 | Status: DISCONTINUED | OUTPATIENT
Start: 2024-02-01 | End: 2024-02-02

## 2024-02-01 RX ORDER — HYDROMORPHONE HYDROCHLORIDE 2 MG/ML
2 INJECTION INTRAMUSCULAR; INTRAVENOUS; SUBCUTANEOUS EVERY 4 HOURS
Refills: 0 | Status: DISCONTINUED | OUTPATIENT
Start: 2024-02-01 | End: 2024-02-02

## 2024-02-01 RX ADMIN — Medication 1000 MILLIGRAM(S): at 23:15

## 2024-02-01 RX ADMIN — PANTOPRAZOLE SODIUM 40 MILLIGRAM(S): 20 TABLET, DELAYED RELEASE ORAL at 05:39

## 2024-02-01 RX ADMIN — Medication 650 MILLIGRAM(S): at 04:30

## 2024-02-01 RX ADMIN — Medication 100 MILLIGRAM(S): at 05:39

## 2024-02-01 RX ADMIN — HYDROMORPHONE HYDROCHLORIDE 0.5 MILLIGRAM(S): 2 INJECTION INTRAMUSCULAR; INTRAVENOUS; SUBCUTANEOUS at 01:25

## 2024-02-01 RX ADMIN — METHOCARBAMOL 500 MILLIGRAM(S): 500 TABLET, FILM COATED ORAL at 07:23

## 2024-02-01 RX ADMIN — METHOCARBAMOL 500 MILLIGRAM(S): 500 TABLET, FILM COATED ORAL at 18:00

## 2024-02-01 RX ADMIN — Medication 5 MILLIGRAM(S): at 21:00

## 2024-02-01 RX ADMIN — Medication 100 MILLIGRAM(S): at 21:00

## 2024-02-01 RX ADMIN — HYDROMORPHONE HYDROCHLORIDE 0.5 MILLIGRAM(S): 2 INJECTION INTRAMUSCULAR; INTRAVENOUS; SUBCUTANEOUS at 01:30

## 2024-02-01 RX ADMIN — POLYETHYLENE GLYCOL 3350 17 GRAM(S): 17 POWDER, FOR SOLUTION ORAL at 17:33

## 2024-02-01 RX ADMIN — Medication 1000 MILLIGRAM(S): at 11:05

## 2024-02-01 RX ADMIN — HYDROMORPHONE HYDROCHLORIDE 2 MILLIGRAM(S): 2 INJECTION INTRAMUSCULAR; INTRAVENOUS; SUBCUTANEOUS at 15:26

## 2024-02-01 RX ADMIN — SODIUM CHLORIDE 75 MILLILITER(S): 9 INJECTION INTRAMUSCULAR; INTRAVENOUS; SUBCUTANEOUS at 09:31

## 2024-02-01 RX ADMIN — SENNA PLUS 2 TABLET(S): 8.6 TABLET ORAL at 21:00

## 2024-02-01 RX ADMIN — Medication 650 MILLIGRAM(S): at 04:03

## 2024-02-01 RX ADMIN — TRAMADOL HYDROCHLORIDE 50 MILLIGRAM(S): 50 TABLET ORAL at 09:31

## 2024-02-01 RX ADMIN — Medication 1000 MILLIGRAM(S): at 17:33

## 2024-02-01 RX ADMIN — Medication 40 MILLIEQUIVALENT(S): at 07:43

## 2024-02-01 RX ADMIN — Medication 100 MILLIGRAM(S): at 13:43

## 2024-02-01 RX ADMIN — Medication 1000 MILLIGRAM(S): at 23:59

## 2024-02-01 NOTE — DISCHARGE NOTE PROVIDER - CARE PROVIDER_API CALL
Estuardo Price.  Neurosurgery  [address] [place] [address]  Phone: [phone]  Fax: [phone]  Follow Up Time:

## 2024-02-01 NOTE — PROGRESS NOTE ADULT - SUBJECTIVE AND OBJECTIVE BOX
SUBJECTIVE: POD #1 from rodney[facility] GALLARDO. Patient reports pain at back of head at site of incision as well as bitemporal pressure headache. OR cultures pending.    MEDICATIONS:  MEDICATIONS  (STANDING):  acetaminophen     Tablet .. 1000 milliGRAM(s) Oral every 6 hours  bisacodyl 5 milliGRAM(s) Oral at bedtime  ceFAZolin   IVPB 2000 milliGRAM(s) IV Intermittent every 8 hours  influenza   Vaccine 0.5 milliLiter(s) IntraMuscular once  pantoprazole    Tablet 40 milliGRAM(s) Oral before breakfast  senna 2 Tablet(s) Oral at bedtime    MEDICATIONS  (PRN):  methocarbamol 500 milliGRAM(s) Oral three times a day PRN Muscle Spasm  ondansetron Injectable 4 milliGRAM(s) IV Push every 6 hours PRN Nausea and/or Vomiting  traMADol 25 milliGRAM(s) Oral every 4 hours PRN Moderate Pain (4 - 6)  traMADol 50 milliGRAM(s) Oral every 4 hours PRN Severe Pain (7 - 10)      Allergies    oxycodone (Other; Hypotension)    Intolerances        OBJECTIVE:  Vital Signs Last 24 Hrs  T(C): 37 (01 Feb 2024 09:01), Max: 37.1 (31 Jan 2024 13:15)  T(F): 98.6 (01 Feb 2024 09:01), Max: 98.8 (31 Jan 2024 16:09)  HR: 84 (01 Feb 2024 09:00) (53 - 84)  BP: 120/57 (01 Feb 2024 09:00) (103/59 - 120/62)  BP(mean): 76 (01 Feb 2024 09:00) (70 - 98)  RR: 18 (01 Feb 2024 09:00) (8 - 18)  SpO2: 98% (01 Feb 2024 09:00) (94% - 100%)    Parameters below as of 01 Feb 2024 09:00  Patient On (Oxygen Delivery Method): room air      I&O's Summary    31 Jan 2024 07:01  -  01 Feb 2024 07:00  --------------------------------------------------------  IN: 800 mL / OUT: 1650 mL / NET: -850 mL    01 Feb 2024 07:01  -  01 Feb 2024 11:32  --------------------------------------------------------  IN: 150 mL / OUT: 600 mL / NET: -450 mL        PHYSICAL EXAM:  Gen: appears stated age, resting comfortably, NAD  HEENT: occipital incision with overlying dressing, MMM  Neck: supple  CV: RRR, no m/r/g, peripheral pulses 2+  Pulm: CTAB, no increased work of breathing, no rales/rhonchi  Abd: soft, ND, NT, no rebound or guarding  Skin: warm and dry  Ext: non-tender, no edema  Neuro: AOx3, speaking in full sentences  Psych: affect and behavior appropriate, pleasant at time of interview    LABS:                        11.5   6.31  )-----------( 217      ( 01 Feb 2024 06:54 )             33.8     02-01    135  |  104  |  11  ----------------------------<  101<H>  3.7   |  25  |  0.57    Ca    8.9      01 Feb 2024 06:54  Phos  3.4     02-01  Mg     2.1     02-01        PT/INR - ( 31 Jan 2024 05:30 )   PT: 12.1 sec;   INR: 1.06          PTT - ( 31 Jan 2024 05:30 )  PTT:35.0 sec  CAPILLARY BLOOD GLUCOSE        Urinalysis Basic - ( 01 Feb 2024 06:54 )    Color: x / Appearance: x / SG: x / pH: x  Gluc: 101 mg/dL / Ketone: x  / Bili: x / Urobili: x   Blood: x / Protein: x / Nitrite: x   Leuk Esterase: x / RBC: x / WBC x   Sq Epi: x / Non Sq Epi: x / Bacteria: x        MICRODATA:    Culture - Fungal, Other (collected 31 Jan 2024 14:50)  Source: .Other #2 epidural  Preliminary Report (01 Feb 2024 08:05):    Testing in progress    Culture - Fungal, Other (collected 31 Jan 2024 14:50)  Source: .Other #3 epidural  Preliminary Report (01 Feb 2024 08:05):    Testing in progress    Culture - Fungal, Other (collected 31 Jan 2024 14:50)  Source: .Other #1 superficial wound  Preliminary Report (01 Feb 2024 08:05):    Testing in progress    Culture - Surgical Swab (collected 31 Jan 2024 14:50)  Source: .Surgical Swab #2 epidural  Gram Stain (31 Jan 2024 19:19):    No organisms seen    Few White blood cells  Preliminary Report (01 Feb 2024 09:08):    No growth to date.    Culture - Surgical Swab (collected 31 Jan 2024 14:50)  Source: .Surgical Swab #3 epidural  Gram Stain (31 Jan 2024 19:19):    No organisms seen    Few White blood cells  Preliminary Report (01 Feb 2024 09:07):    No growth to date.    Culture - Surgical Swab (collected 31 Jan 2024 14:50)  Source: .Surgical Swab #1 superficial wound  Gram Stain (31 Jan 2024 19:19):    No organisms seen    Few White blood cells  Preliminary Report (01 Feb 2024 09:09):    No growth to date.    Culture - Blood (collected 29 Jan 2024 17:42)  Source: .Blood Blood  Preliminary Report (31 Jan 2024 20:00):    No growth at 2 days.    Culture - Blood (collected 29 Jan 2024 17:42)  Source: .Blood Blood  Preliminary Report (31 Jan 2024 20:00):    No growth at 2 days.    Urinalysis with Rflx Culture (collected 29 Jan 2024 15:00)        RADIOLOGY/OTHER STUDIES:

## 2024-02-01 NOTE — PROGRESS NOTE ADULT - ASSESSMENT
43 YOF with PMH Of class II obesity, Chiari malformation s/p suboccipital craniotomy and C1 laminectomy (12/14/23 with Dr. Price) who was noted to have drainage from incision on post-op checks admitted for washout/revision of wound s/p OR with Dr. Price 2/1. Currently on cefazolin pending OR cultures.      Incisional wound drainage s/p washout  Chiari malformation s/p crani and decompression  - management per primary  - CTH wtih 6.3 cm fluid collection containing air overlying the craniectomy site   - s/p OR with Dr. Price on 1/21/24 for washout of suboccipital wound  - afebrile, no leukocytosis, BC NGTD, OR cultures NGTD  - cephalexin 1/29-1/31, cefazolin 1/31-present  - pain control with bowel regimen, OOBTC as tolerated  - consider chemical DVT ppx with LMWH once surgically cleared    Class II obesity  - recommend nutrition consult    Dispo: pending OR cultures, recommend d/c tele    Recommendations and plan discussed with primary team.  43 YOF with PMH Of class II obesity, Chiari malformation s/p suboccipital craniotomy and C1 laminectomy (12/14/23 with Dr. Price) who was noted to have drainage from incision on post-op checks admitted for washout/revision of wound s/p OR with Dr. Price 2/1. Currently on cefazolin pending OR cultures.      Incisional wound drainage s/p washout  Chiari malformation s/p crani and decompression  Post operative state  - management per primary  - CTH wtih 6.3 cm fluid collection containing air overlying the craniectomy site   - s/p OR with Dr. Price on 1/21/24 for washout of suboccipital wound  - afebrile, no leukocytosis, BC NGTD, OR cultures NGTD  - cephalexin 1/29-1/31, cefazolin 1/31-present  - pain control with bowel regimen, OOBTC as tolerated  - consider chemical DVT ppx with LMWH once surgically cleared    Class II obesity  - recommend nutrition consult    Dispo: pending OR cultures, recommend d/c tele    Recommendations and plan discussed with primary team.

## 2024-02-01 NOTE — PROGRESS NOTE ADULT - SUBJECTIVE AND OBJECTIVE BOX
HPI:  44yo F PMHx Chiari Malformation (s/p SOC, C1 laminectomy with duraplasty on 12/14/23), chronic headaches, presents to [facility] ED c/o SOC incisional clear drainage x 5 days. Per pt 1/25/24 patient woke from sleep and noted her pillow was saturated with clear fluid. Pt was seen in the office by Dr. Price that day who cleaned the wound and removed an area of scabbing from the superior portion of the incision. After the scab was removed, there was clear fluid leaking from the incision which was sent for culture (NGTD), and 6 non-absorbable sutures were placed to close the area of wound dehiscence. Pt was sent home with a prescription for Keflex 500mg TID. Per pt, she then again noticed clear fluid leaking from her incision on Sunday 1/28, and pt was again seen in the office by Dr. Price today who recommended pt be evaluated in the ED for CT with IV contrast and infectious work-up. Pt reports she has mostly incisional pain with some associated b/l temporal headache for which she takes tramadol PRN with some relief. Pt denies photophobia, dizziness, fainting/LOC, meningismus, N/V, fever/chills, recent travel or known sick contact.  (29 Jan 2024 15:34)    HOSPITAL COURSE:  1/29: Admitted. Pre-op for Wednesday.   1/30: Pending pre-op labs and medical clearance. NPO at midnight, starting IVF while NPO.   1/31: JOSAFAT overnight. Pending OR today.     OVERNIGHT EVENTS: POD 1. JOSAFAT overnight. Given dilaudid for breakthrough pain.     Vital Signs Last 24 Hrs  T(C): 36.8 (31 Jan 2024 21:46), Max: 37.1 (31 Jan 2024 00:19)  T(F): 98.2 (31 Jan 2024 21:46), Max: 98.8 (31 Jan 2024 00:19)  HR: 72 (31 Jan 2024 21:01) (53 - 77)  BP: 106/64 (31 Jan 2024 21:01) (94/54 - 120/62)  BP(mean): 79 (31 Jan 2024 21:01) (70 - 98)  RR: 18 (31 Jan 2024 21:01) (8 - 18)  SpO2: 94% (31 Jan 2024 21:01) (94% - 100%)    Parameters below as of 31 Jan 2024 21:01  Patient On (Oxygen Delivery Method): room air    I&O's Summary    30 Jan 2024 07:01  -  31 Jan 2024 07:00  --------------------------------------------------------  IN: 720 mL / OUT: 0 mL / NET: 720 mL    31 Jan 2024 07:01  -  01 Feb 2024 00:11  --------------------------------------------------------  IN: 425 mL / OUT: 600 mL / NET: -175 mL    PHYSICAL EXAM:  General: Pt is sitting up comfortably in bed, in NAD, on RA  HEENT: CN II-XII grossly intact, PERRL 3mm, EOMI B/L, face symmetric, tongue midline, neck FROM  Cardiovascular: RRR, normal S1 and S2   Respiratory: non-labored breathing, symmetric chest rise   GI: abd soft, NTND   Neuro: A&O x 3, no aphasia, speech clear, no dysmetria, no pronator drift  Strength 5/5 throughout all 4 extremities  Sensation intact to light touch throughout   Vascular: Distal pulses 2+ x4, no calf edema or erythema  Wounds: Suboccipital incision dressing C/D/I    DIET:  [] NPO  [X] Mechanical, ADAT  [] Tube feeds    LABS:                        12.1   6.28  )-----------( 244      ( 31 Jan 2024 05:30 )             36.0     01-31    136  |  104  |  16  ----------------------------<  95  3.8   |  22  |  0.57    Ca    9.0      31 Jan 2024 05:30  Phos  3.9     01-31  Mg     2.0     01-31      PT/INR - ( 31 Jan 2024 05:30 )   PT: 12.1 sec;   INR: 1.06          PTT - ( 31 Jan 2024 05:30 )  PTT:35.0 sec  Urinalysis Basic - ( 31 Jan 2024 05:30 )    Color: x / Appearance: x / SG: x / pH: x  Gluc: 95 mg/dL / Ketone: x  / Bili: x / Urobili: x   Blood: x / Protein: x / Nitrite: x   Leuk Esterase: x / RBC: x / WBC x   Sq Epi: x / Non Sq Epi: x / Bacteria: x    CAPILLARY BLOOD GLUCOSE    Allergies    oxycodone (Other; Hypotension)    Intolerances      MEDICATIONS:  Antibiotics:  ceFAZolin   IVPB 2000 milliGRAM(s) IV Intermittent every 8 hours    Neuro:  acetaminophen     Tablet .. 650 milliGRAM(s) Oral every 6 hours PRN  HYDROmorphone  Injectable 0.5 milliGRAM(s) IV Push every 4 hours PRN  ondansetron Injectable 4 milliGRAM(s) IV Push every 6 hours PRN  traMADol 25 milliGRAM(s) Oral every 4 hours PRN  traMADol 50 milliGRAM(s) Oral every 6 hours PRN    Anticoagulation:    OTHER:  bisacodyl 5 milliGRAM(s) Oral at bedtime  influenza   Vaccine 0.5 milliLiter(s) IntraMuscular once  pantoprazole    Tablet 40 milliGRAM(s) Oral before breakfast  senna 2 Tablet(s) Oral at bedtime    IVF:  sodium chloride 0.9%. 1000 milliLiter(s) IV Continuous <Continuous>    CULTURES:  Culture Results:   No growth at 2 days. (01-29 @ 17:42)  Culture Results:   No growth at 2 days. (01-29 @ 17:42)    ASSESSMENT:  44yo F PMHx Chiari Malformation (s/p SOC, C1 laminectomy with duraplasty on 12/14/23), chronic headaches, presents to [facility] ED c/o SOC incisional clear drainage x 5 days. Pt started on Keflex 500mg TID, incision culture sent, and sutures placed in office on 1/25 for incisional drainage. Pt admitted to [facility] for infectious work-up, now s/p SOC wound washout (1/31/24).    HISTORY OF CHIARIMALFORMATION    No pertinent family history in first degree relatives    Handoff    MEWS Score    No pertinent past medical history    Arachnoid cyst    History of Chiari malformation    History of Chiari malformation    History of Chiari malformation    History of Chiari malformation    Draining postoperative wound    Class II obesity    H/O breast surgery    History of Chiari malformation    Status post craniectomy    POST OP COMPLICATION    44    [email or web address]    PLAN:  NEURO:   - neuro and vitals checks q4hrs   - Pain control: tylenol, tramadol, dilaudid 0.5 for breakthrough  - CTH with IV contrast 1/29: 6.3 cm fluid collection containing air overlying the craniectomy site which may represent postsurgical changes/pseudomeningocele versus infection    CARDIO:  - SBP goal normotensive     PULM:   - on RA  - encourage incentive spirometery     GI:  - ADAT   - Continue home Protonix 40 daily   - bowel regimen      RENAL:   - normonatremia goal   - IVF while NPO  - Voiding     ENDO:   - no active issues     ID:   - Ceftriaxone 2g q8   - f/u blood cultures and urine culture from admission & OR cultures   - no ID consult    HEME:   - SCDs b/l LE DVT ppx     Dispo:   full code, dispo pend OR plan       D/w Dr. Price

## 2024-02-01 NOTE — DISCHARGE NOTE PROVIDER - HOSPITAL COURSE
HPI:  42yo F PMHx Chiari Malformation (s/p SOC, C1 laminectomy with duraplasty on 12/14/23), chronic headaches, presents to [facility] ED c/o SOC incisional clear drainage x 5 days. Per pt 1/25/24 patient woke from sleep and noted her pillow was saturated with clear fluid. Pt was seen in the office by Dr. Price that day who cleaned the wound and removed an area of scabbing from the superior portion of the incision. After the scab was removed, there was clear fluid leaking from the incision which was sent for culture (NGTD), and 6 non-absorbable sutures were placed to close the area of wound dehiscence. Pt was sent home with a prescription for Keflex 500mg TID. Per pt, she then again noticed clear fluid leaking from her incision on Sunday 1/28, and pt was again seen in the office by Dr. Price today who recommended pt be evaluated in the ED for CT with IV contrast and infectious work-up. Pt reports she has mostly incisional pain with some associated b/l temporal headache for which she takes tramadol PRN with some relief. Pt denies photophobia, dizziness, fainting/LOC, meningismus, N/V, fever/chills, recent travel or known sick contact.    Hospital Course:  1/29: Admitted. Pre-op for Wednesday.   1/30: Pending pre-op labs and medical clearance. NPO at midnight, starting IVF while NPO.   1/31: JOSAFAT overnight. Pending OR today.     Patient evaluated by PT/OT who recommended:  Patient is going home? rehab? hospice? Facility Name:     Hospital course c/b:     Exam on day of discharge:    Checklist:   - Obtained follow up appointment from NP  - Reviewed final recommendations of inpatient consults  - review discharge planning on provider handoff  - post op imaging completed  - Neurologically stable for discharge  - Vitals stable for discharge   - Afebrile for discharge  - WBC is stable  - Sodium level is normal  - Pain is adequately controlled  - Pt has PICC/walker/brace/collar   - LACE score (10 or > needs PCP apt)   - stroke patient? Discharge NIHSS score   HPI:  44yo F PMHx Chiari Malformation (s/p SOC, C1 laminectomy with duraplasty on 12/14/23), chronic headaches, presents to [facility] ED c/o SOC incisional clear drainage x 5 days. Per pt 1/25/24 patient woke from sleep and noted her pillow was saturated with clear fluid. Pt was seen in the office by Dr. Price that day who cleaned the wound and removed an area of scabbing from the superior portion of the incision. After the scab was removed, there was clear fluid leaking from the incision which was sent for culture (NGTD), and 6 non-absorbable sutures were placed to close the area of wound dehiscence. Pt was sent home with a prescription for Keflex 500mg TID. Per pt, she then again noticed clear fluid leaking from her incision on Sunday 1/28, and pt was again seen in the office by Dr. Price today who recommended pt be evaluated in the ED for CT with IV contrast and infectious work-up. Pt reports she has mostly incisional pain with some associated b/l temporal headache for which she takes tramadol PRN with some relief. Pt denies photophobia, dizziness, fainting/LOC, meningismus, N/V, fever/chills, recent travel or known sick contact.    Hospital Course:  1/29: Admitted. Pre-op for Wednesday.   1/30: Pending pre-op labs and medical clearance. NPO at midnight, starting IVF while NPO.   1/31: JOSAFAT overnight. Pending OR today.   2/1: POD 1. JOSAFAT overnight. Given dilaudid for breakthrough pain. Tramadol prn changed to PO dilaudid prn. Added miralax BID, pending BM.   2/2: POD2, JOSAFAT overnight      Patient evaluated by PT/OT who recommended: home with no needs  Patient is going home    Hospital course uncomplicated     Exam on day of discharge:  Neuro: AAOx3, follows commands, opens eyes spontaneously, speech clear and fluent, CNII-XI grossly intact, face symmetric, no pronator drift, strength 5/5 b/l upper extremities and lower extremities, sensation intact to light touch throughout  HEENT: PERRL, EOMI  Wound: suboccipital and posterior neck incision dressing c/d/i with sutures and staples in place     Patient is neuro stable, vitals stable, afebrile, medically ready for discharge

## 2024-02-01 NOTE — DISCHARGE NOTE PROVIDER - NSDCMRMEDTOKEN_GEN_ALL_CORE_FT
Keflex 500 mg oral capsule: 1 cap(s) orally 3 times a day  pantoprazole 40 mg oral delayed release tablet: 1 tab(s) orally once a day (before a meal)  traMADol 50 mg oral tablet: 1 tab(s) orally every 6 hours as needed for Severe Pain (7 - 10) 0.5 tabs every 6 hours as needed for moderate pain or 1 tab every 6 hours as needed for severe pain MDD: 4 tabs   acetaminophen 500 mg oral tablet: 2 tab(s) orally every 6 hours  cephalexin 500 mg oral tablet: 1 tab(s) orally 3 times a day  HYDROmorphone 2 mg oral tablet: 1 tab(s) orally every 6 hours as needed for severe pain MDD: 4 tabs  methocarbamol 500 mg oral tablet: 1 tab(s) orally 3 times a day as needed for Muscle Spasm MDD: 3 tabs  Narcan 4 mg/0.1 mL nasal spray: 4 milligram(s) intranasally once a day as needed for opioid overdose  pantoprazole 40 mg oral delayed release tablet: 1 tab(s) orally once a day (before a meal)  polyethylene glycol 3350 oral powder for reconstitution: 17 gram(s) orally 2 times a day as needed for  constipation

## 2024-02-01 NOTE — DISCHARGE NOTE PROVIDER - NSDCFUADDAPPT_GEN_ALL_CORE_FT
Please follow up with Dr. Price    Please follow up with your primary care doctor  Please follow up with Dr. Price on 2/12 at 8:45AM, call the office to confirm / make changes to appointment at [phone]    Please follow up with your primary care doctor

## 2024-02-01 NOTE — DISCHARGE NOTE PROVIDER - NSDCFUSCHEDAPPT_GEN_ALL_CORE_FT
Estuardo Price Physician [facility]  NEUROSURG [address] [facility] [address]  Scheduled Appointment: 02/12/2024

## 2024-02-01 NOTE — DISCHARGE NOTE PROVIDER - NSDCFUADDINST_GEN_ALL_CORE_FT
Neurosurgery follow up appointment date/time:  - are staples/sutures in place?  - what day should staples/sutures be removed (POD 10-14)?  - please call the office to confirm appointment: [phone]    Wound Care:  - can patient shower?  - does dressing need to be changed/removed?  - no picking at incision  - wears glasses?   - pressure ulcer?     Devices/Drains/Lines:  - PICC in place? ID follow up? (Paper Rx for: antibiotics, heparin flush, weekly lab draws)    Activity:  - fatigue is common after surgery, rest if you feel tired   - no bending, lifting, twisting or heavy lifting   - walking is recommended, ambulate as tolerated  - you may shower when you get home, keep your incision dry  - no soaking in a tub/pool/hot tub   - no driving within 24 hours of anesthesia or while taking prescription pain medications   - keep hydrated, drink plenty of water     Inpatient consults:  -     Please also follow up with your primary care doctor.     Pain Expectations:  - pain after surgery is expected  - please take pain meds as prescribed     Medications:  - changes to home meds (ex. AED's)?  - new meds?  - pain meds?  - when can antiplatelets or anticoagulants be restarted?  - were adverse affects of meds discussed with patients?   - pain medications can cause constipation, you should eat a high fiber diet and may take a stool softener while on pain meds   - Avoid taking Advil (ibuprofen), Motrin (naproxen), or Aspirin for pain as they can cause bleeding     Call the office or come to ED if:  - wound has drainage or bleeding, increased redness or pain at incision site, neurological change, fever (>101), chills, night sweats, syncope, nausea/vomiting, chest pain, shortness of breath      Playback:  - see playback health for a copy of your discharge paperwork     WITHIN 24 HOURS OF DISCHARGE, PLEASE CONTACT NEURO PA  WITH ANY QUESTIONS OR CONCERNS: [phone]   OTHERWISE, PLEASE CALL THE OFFICE WITH ANY QUESTIONS OR CONCERNS: [phone] Neurosurgery follow up appointment date/time: 2/12 at 8:45AM  - follow up in the office for a wound check and suture/staple removal   - please call the office to confirm appointment: [phone]    Wound Care:  - shower and wash hair daily with shampoo  - gently clean incision with soapy water   - pat dry incision with a clean towel after showering  - leave incision uncovered, open to air   - no picking at incision  Activity:  - fatigue is common after surgery, rest if you feel tired   - no bending, lifting, twisting or heavy lifting   - walking is recommended, ambulate as tolerated  - you may shower when you get home, keep your incision dry  - no soaking in a tub/pool/hot tub   - no driving within 24 hours of anesthesia or while taking prescription pain medications   - keep hydrated, drink plenty of water     Please also follow up with your primary care doctor.     Pain Expectations:  - pain after surgery is expected  - please take pain meds as prescribed     Medications:  - continue home Protonix  - new meds:  Keflex 500mg three times per day for 2 weeks (last day = 2/16), can cause GI upset  - pain meds:  Robaxin 500mg every 8 hours as needed for muscle spasm (can cause sleepiness)  Tylenol (over the counter) as needed for mild to moderate pain  Dilaudid 2mg every 6 hours as needed for severe pain (can cause sleepiness, constipation)  - adverse affects of meds discussed with patient  - pain medications can cause constipation, you should eat a high fiber diet and may take a stool softener while on pain meds   - Avoid taking Advil (ibuprofen), Motrin (naproxen), or Aspirin for pain as they can cause bleeding     Call the office or come to ED if:  - wound has drainage or bleeding, increased redness or pain at incision site, neurological change, fever (>101), chills, night sweats, syncope, nausea/vomiting, chest pain, shortness of breath      Playback:  - see playback health for a copy of your discharge paperwork     WITHIN 24 HOURS OF DISCHARGE, PLEASE CONTACT NEURO PA  WITH ANY QUESTIONS OR CONCERNS: [phone]   OTHERWISE, PLEASE CALL THE OFFICE WITH ANY QUESTIONS OR CONCERNS: [phone]

## 2024-02-01 NOTE — DISCHARGE NOTE PROVIDER - NSDCCPCAREPLAN_GEN_ALL_CORE_FT
PRINCIPAL DISCHARGE DIAGNOSIS  Diagnosis: History of Chiari malformation  Assessment and Plan of Treatment: wound drainage  s/p washout of suboccipital wound on 1/31      SECONDARY DISCHARGE DIAGNOSES  Diagnosis: Class II obesity  Assessment and Plan of Treatment:     Diagnosis: Draining postoperative wound  Assessment and Plan of Treatment:      PRINCIPAL DISCHARGE DIAGNOSIS  Diagnosis: History of Chiari malformation  Assessment and Plan of Treatment: wound drainage  s/p washout of suboccipital wound on 1/31  Continue Keflex through 2/16      SECONDARY DISCHARGE DIAGNOSES  Diagnosis: Class II obesity  Assessment and Plan of Treatment:     Diagnosis: Draining postoperative wound  Assessment and Plan of Treatment:     Diagnosis: GERD (gastroesophageal reflux disease)  Assessment and Plan of Treatment: continue home protonix

## 2024-02-02 ENCOUNTER — TRANSCRIPTION ENCOUNTER (OUTPATIENT)
Age: 44
End: 2024-02-02

## 2024-02-02 VITALS — TEMPERATURE: 99 F

## 2024-02-02 PROBLEM — Z86.69 PERSONAL HISTORY OF OTHER DISEASES OF THE NERVOUS SYSTEM AND SENSE ORGANS: Chronic | Status: ACTIVE | Noted: 2024-01-29

## 2024-02-02 LAB
ALBUMIN SERPL ELPH-MCNC: 3.4 G/DL — SIGNIFICANT CHANGE UP (ref 3.3–5)
ALP SERPL-CCNC: 66 U/L — SIGNIFICANT CHANGE UP (ref 40–120)
ALT FLD-CCNC: 25 U/L — SIGNIFICANT CHANGE UP (ref 10–45)
ANION GAP SERPL CALC-SCNC: 8 MMOL/L — SIGNIFICANT CHANGE UP (ref 5–17)
AST SERPL-CCNC: 24 U/L — SIGNIFICANT CHANGE UP (ref 10–40)
BASOPHILS # BLD AUTO: 0.04 K/UL — SIGNIFICANT CHANGE UP (ref 0–0.2)
BASOPHILS NFR BLD AUTO: 0.8 % — SIGNIFICANT CHANGE UP (ref 0–2)
BILIRUB SERPL-MCNC: 0.2 MG/DL — SIGNIFICANT CHANGE UP (ref 0.2–1.2)
BUN SERPL-MCNC: 9 MG/DL — SIGNIFICANT CHANGE UP (ref 7–23)
CALCIUM SERPL-MCNC: 9 MG/DL — SIGNIFICANT CHANGE UP (ref 8.4–10.5)
CHLORIDE SERPL-SCNC: 104 MMOL/L — SIGNIFICANT CHANGE UP (ref 96–108)
CO2 SERPL-SCNC: 23 MMOL/L — SIGNIFICANT CHANGE UP (ref 22–31)
CREAT SERPL-MCNC: 0.52 MG/DL — SIGNIFICANT CHANGE UP (ref 0.5–1.3)
CRP SERPL-MCNC: 39 MG/L — HIGH (ref 0–4)
EGFR: 120 ML/MIN/1.73M2 — SIGNIFICANT CHANGE UP
EOSINOPHIL # BLD AUTO: 0.37 K/UL — SIGNIFICANT CHANGE UP (ref 0–0.5)
EOSINOPHIL NFR BLD AUTO: 7.3 % — HIGH (ref 0–6)
ERYTHROCYTE [SEDIMENTATION RATE] IN BLOOD: 30 MM/HR — HIGH
GLUCOSE SERPL-MCNC: 88 MG/DL — SIGNIFICANT CHANGE UP (ref 70–99)
HCT VFR BLD CALC: 32.9 % — LOW (ref 34.5–45)
HGB BLD-MCNC: 11.1 G/DL — LOW (ref 11.5–15.5)
IMM GRANULOCYTES NFR BLD AUTO: 0.4 % — SIGNIFICANT CHANGE UP (ref 0–0.9)
LYMPHOCYTES # BLD AUTO: 1.13 K/UL — SIGNIFICANT CHANGE UP (ref 1–3.3)
LYMPHOCYTES # BLD AUTO: 22.2 % — SIGNIFICANT CHANGE UP (ref 13–44)
MAGNESIUM SERPL-MCNC: 2.1 MG/DL — SIGNIFICANT CHANGE UP (ref 1.6–2.6)
MCHC RBC-ENTMCNC: 29.4 PG — SIGNIFICANT CHANGE UP (ref 27–34)
MCHC RBC-ENTMCNC: 33.7 GM/DL — SIGNIFICANT CHANGE UP (ref 32–36)
MCV RBC AUTO: 87.3 FL — SIGNIFICANT CHANGE UP (ref 80–100)
MONOCYTES # BLD AUTO: 0.32 K/UL — SIGNIFICANT CHANGE UP (ref 0–0.9)
MONOCYTES NFR BLD AUTO: 6.3 % — SIGNIFICANT CHANGE UP (ref 2–14)
NEUTROPHILS # BLD AUTO: 3.2 K/UL — SIGNIFICANT CHANGE UP (ref 1.8–7.4)
NEUTROPHILS NFR BLD AUTO: 63 % — SIGNIFICANT CHANGE UP (ref 43–77)
NRBC # BLD: 0 /100 WBCS — SIGNIFICANT CHANGE UP (ref 0–0)
PHOSPHATE SERPL-MCNC: 3.8 MG/DL — SIGNIFICANT CHANGE UP (ref 2.5–4.5)
PLATELET # BLD AUTO: 212 K/UL — SIGNIFICANT CHANGE UP (ref 150–400)
POTASSIUM SERPL-MCNC: 3.7 MMOL/L — SIGNIFICANT CHANGE UP (ref 3.5–5.3)
POTASSIUM SERPL-SCNC: 3.7 MMOL/L — SIGNIFICANT CHANGE UP (ref 3.5–5.3)
PROT SERPL-MCNC: 6 G/DL — SIGNIFICANT CHANGE UP (ref 6–8.3)
RBC # BLD: 3.77 M/UL — LOW (ref 3.8–5.2)
RBC # FLD: 13 % — SIGNIFICANT CHANGE UP (ref 10.3–14.5)
SODIUM SERPL-SCNC: 135 MMOL/L — SIGNIFICANT CHANGE UP (ref 135–145)
WBC # BLD: 5.08 K/UL — SIGNIFICANT CHANGE UP (ref 3.8–10.5)
WBC # FLD AUTO: 5.08 K/UL — SIGNIFICANT CHANGE UP (ref 3.8–10.5)

## 2024-02-02 PROCEDURE — 71045 X-RAY EXAM CHEST 1 VIEW: CPT

## 2024-02-02 PROCEDURE — 97162 PT EVAL MOD COMPLEX 30 MIN: CPT

## 2024-02-02 PROCEDURE — 85610 PROTHROMBIN TIME: CPT

## 2024-02-02 PROCEDURE — 86901 BLOOD TYPING SEROLOGIC RH(D): CPT

## 2024-02-02 PROCEDURE — 97165 OT EVAL LOW COMPLEX 30 MIN: CPT

## 2024-02-02 PROCEDURE — 85025 COMPLETE CBC W/AUTO DIFF WBC: CPT

## 2024-02-02 PROCEDURE — 86140 C-REACTIVE PROTEIN: CPT

## 2024-02-02 PROCEDURE — 93005 ELECTROCARDIOGRAM TRACING: CPT

## 2024-02-02 PROCEDURE — 99231 SBSQ HOSP IP/OBS SF/LOW 25: CPT

## 2024-02-02 PROCEDURE — 99285 EMERGENCY DEPT VISIT HI MDM: CPT | Mod: 25

## 2024-02-02 PROCEDURE — 86850 RBC ANTIBODY SCREEN: CPT

## 2024-02-02 PROCEDURE — 84100 ASSAY OF PHOSPHORUS: CPT

## 2024-02-02 PROCEDURE — 80048 BASIC METABOLIC PNL TOTAL CA: CPT

## 2024-02-02 PROCEDURE — 97164 PT RE-EVAL EST PLAN CARE: CPT

## 2024-02-02 PROCEDURE — 70460 CT HEAD/BRAIN W/DYE: CPT | Mod: MA

## 2024-02-02 PROCEDURE — 83605 ASSAY OF LACTIC ACID: CPT

## 2024-02-02 PROCEDURE — 81025 URINE PREGNANCY TEST: CPT

## 2024-02-02 PROCEDURE — 86870 RBC ANTIBODY IDENTIFICATION: CPT

## 2024-02-02 PROCEDURE — 87040 BLOOD CULTURE FOR BACTERIA: CPT

## 2024-02-02 PROCEDURE — 86922 COMPATIBILITY TEST ANTIGLOB: CPT

## 2024-02-02 PROCEDURE — 80053 COMPREHEN METABOLIC PANEL: CPT

## 2024-02-02 PROCEDURE — 85027 COMPLETE CBC AUTOMATED: CPT

## 2024-02-02 PROCEDURE — 86880 COOMBS TEST DIRECT: CPT

## 2024-02-02 PROCEDURE — 81001 URINALYSIS AUTO W/SCOPE: CPT

## 2024-02-02 PROCEDURE — 87075 CULTR BACTERIA EXCEPT BLOOD: CPT

## 2024-02-02 PROCEDURE — 83735 ASSAY OF MAGNESIUM: CPT

## 2024-02-02 PROCEDURE — C1889: CPT

## 2024-02-02 PROCEDURE — 36415 COLL VENOUS BLD VENIPUNCTURE: CPT

## 2024-02-02 PROCEDURE — 85652 RBC SED RATE AUTOMATED: CPT

## 2024-02-02 PROCEDURE — 87102 FUNGUS ISOLATION CULTURE: CPT

## 2024-02-02 PROCEDURE — 87070 CULTURE OTHR SPECIMN AEROBIC: CPT

## 2024-02-02 PROCEDURE — 85730 THROMBOPLASTIN TIME PARTIAL: CPT

## 2024-02-02 PROCEDURE — 86900 BLOOD TYPING SEROLOGIC ABO: CPT

## 2024-02-02 RX ORDER — HYDROMORPHONE HYDROCHLORIDE 2 MG/ML
1 INJECTION INTRAMUSCULAR; INTRAVENOUS; SUBCUTANEOUS
Qty: 28 | Refills: 0
Start: 2024-02-02 | End: 2024-02-08

## 2024-02-02 RX ORDER — POLYETHYLENE GLYCOL 3350 17 G/17G
17 POWDER, FOR SOLUTION ORAL
Qty: 476 | Refills: 0
Start: 2024-02-02 | End: 2024-02-15

## 2024-02-02 RX ORDER — HYDROMORPHONE HYDROCHLORIDE 2 MG/ML
0.5 INJECTION INTRAMUSCULAR; INTRAVENOUS; SUBCUTANEOUS
Qty: 14 | Refills: 0
Start: 2024-02-02 | End: 2024-02-08

## 2024-02-02 RX ORDER — PANTOPRAZOLE SODIUM 20 MG/1
1 TABLET, DELAYED RELEASE ORAL
Qty: 0 | Refills: 0 | DISCHARGE
Start: 2024-02-02

## 2024-02-02 RX ORDER — CEPHALEXIN 500 MG
1 CAPSULE ORAL
Qty: 42 | Refills: 0
Start: 2024-02-02 | End: 2024-02-15

## 2024-02-02 RX ORDER — NALOXONE HYDROCHLORIDE 4 MG/.1ML
4 SPRAY NASAL
Qty: 1 | Refills: 0
Start: 2024-02-02 | End: 2024-02-02

## 2024-02-02 RX ORDER — ACETAMINOPHEN 500 MG
2 TABLET ORAL
Qty: 0 | Refills: 0 | DISCHARGE
Start: 2024-02-02

## 2024-02-02 RX ORDER — METHOCARBAMOL 500 MG/1
1 TABLET, FILM COATED ORAL
Qty: 21 | Refills: 0
Start: 2024-02-02 | End: 2024-02-08

## 2024-02-02 RX ADMIN — HYDROMORPHONE HYDROCHLORIDE 2 MILLIGRAM(S): 2 INJECTION INTRAMUSCULAR; INTRAVENOUS; SUBCUTANEOUS at 10:26

## 2024-02-02 RX ADMIN — Medication 1000 MILLIGRAM(S): at 10:59

## 2024-02-02 RX ADMIN — Medication 100 MILLIGRAM(S): at 05:26

## 2024-02-02 RX ADMIN — METHOCARBAMOL 500 MILLIGRAM(S): 500 TABLET, FILM COATED ORAL at 03:36

## 2024-02-02 RX ADMIN — Medication 1000 MILLIGRAM(S): at 05:26

## 2024-02-02 RX ADMIN — Medication 1000 MILLIGRAM(S): at 06:30

## 2024-02-02 RX ADMIN — PANTOPRAZOLE SODIUM 40 MILLIGRAM(S): 20 TABLET, DELAYED RELEASE ORAL at 05:26

## 2024-02-02 NOTE — PROGRESS NOTE ADULT - PROVIDER SPECIALTY LIST ADULT
Internal Medicine
Neurosurgery
Internal Medicine
Internal Medicine
Neurosurgery

## 2024-02-02 NOTE — DISCHARGE NOTE NURSING/CASE MANAGEMENT/SOCIAL WORK - NSDCPEFALRISK_GEN_ALL_CORE
For information on Fall & Injury Prevention, visit: [email or web address] OR  [email or web address] OR  https://www.cdc.gov/steadi/patient.html limited use during pregnancy

## 2024-02-02 NOTE — DISCHARGE NOTE NURSING/CASE MANAGEMENT/SOCIAL WORK - PATIENT PORTAL LINK FT
You can access the FollowMyHealth Patient Portal offered by [facility] by registering at the following website: [email or web address]. By joining [employer]’s FollowMyHealth portal, you will also be able to view your health information using other applications (apps) compatible with our system.

## 2024-02-02 NOTE — PROGRESS NOTE ADULT - ASSESSMENT
43 YOF with PMH Of class II obesity, Chiari malformation s/p suboccipital craniotomy and C1 laminectomy (12/14/23 with Dr. Price) who was noted to have drainage from incision on post-op checks admitted for washout/revision of wound s/p OR with Dr. Price 2/1. Currently on cefazolin, OR cultures. NGTD.     Incisional wound drainage s/p washout  Chiari malformation s/p crani and decompression  Post operative state  - management per primary  - CTH wtih 6.3 cm fluid collection containing air overlying the craniectomy site   - s/p OR with Dr. Price on 1/21/24 for washout of suboccipital wound  - afebrile, no leukocytosis, BC NGTD, OR cultures NGTD  - cephalexin 1/29-1/31, cefazolin 1/31-present, plan for discharge home on cephalexin per primary  - pain control with bowel regimen, OOBTC as tolerated  - consider chemical DVT ppx with LMWH once surgically cleared    Class II obesity  - recommend nutrition consult    Dispo: anticipate home    Recommendations and plan discussed with primary team.

## 2024-02-02 NOTE — PROGRESS NOTE ADULT - SUBJECTIVE AND OBJECTIVE BOX
HPI:  42yo F PMHx Chiari Malformation (s/p SOC, C1 laminectomy with duraplasty on 12/14/23), chronic headaches, presents to [facility] ED c/o SOC incisional clear drainage x 5 days. Per pt 1/25/24 patient woke from sleep and noted her pillow was saturated with clear fluid. Pt was seen in the office by Dr. Price that day who cleaned the wound and removed an area of scabbing from the superior portion of the incision. After the scab was removed, there was clear fluid leaking from the incision which was sent for culture (NGTD), and 6 non-absorbable sutures were placed to close the area of wound dehiscence. Pt was sent home with a prescription for Keflex 500mg TID. Per pt, she then again noticed clear fluid leaking from her incision on Sunday 1/28, and pt was again seen in the office by Dr. Price today who recommended pt be evaluated in the ED for CT with IV contrast and infectious work-up. Pt reports she has mostly incisional pain with some associated b/l temporal headache for which she takes tramadol PRN with some relief. Pt denies photophobia, dizziness, fainting/LOC, meningismus, N/V, fever/chills, recent travel or known sick contact.  (29 Jan 2024 15:34)      Subjective:  Patient admits to incisional/neck pain improved after receiving tylenol. Patient reports an occasional feeling of "fullness" in her ears.       Hospital course:  1/29: Admitted. Pre-op for Wednesday.   1/30: Pending pre-op labs and medical clearance. NPO at midnight, starting IVF while NPO.   1/31: JOSAFAT overnight. Pending OR today.   2/1: POD 1. JOSAFAT overnight. Given dilaudid for breakthrough pain. Tramadol prn changed to PO dilaudid prn. Added miralax BID, pending BM.   2/2: POD2, JOSAFAT overnight              Vital Signs Last 24 Hrs  T(C): 36.7 (01 Feb 2024 21:45), Max: 37.1 (01 Feb 2024 04:52)  T(F): 98.1 (01 Feb 2024 21:45), Max: 98.7 (01 Feb 2024 04:52)  HR: 84 (01 Feb 2024 23:30) (68 - 96)  BP: 108/54 (01 Feb 2024 23:30) (107/70 - 120/67)  BP(mean): 78 (01 Feb 2024 23:30) (76 - 89)  RR: 17 (01 Feb 2024 23:30) (17 - 18)  SpO2: 95% (01 Feb 2024 23:30) (95% - 98%)    Parameters below as of 01 Feb 2024 23:30  Patient On (Oxygen Delivery Method): room air        I&O's Summary    31 Jan 2024 07:01  -  01 Feb 2024 07:00  --------------------------------------------------------  IN: 800 mL / OUT: 1650 mL / NET: -850 mL    01 Feb 2024 07:01  -  02 Feb 2024 00:06  --------------------------------------------------------  IN: 650 mL / OUT: 950 mL / NET: -300 mL        PHYSICAL EXAM:  General: patient seen laying supine in bed in NAD  Neuro: AAOx3, follows commands, opens eyes spontaneously, speech clear and fluent, CNII-XI grossly intact, face symmetric, no pronator drift, strength 5/5 b/l upper extremities and lower extremities, sensation intact to light touch throughout  HEENT: PERRL, EOMI  Neck: supple  Cardiac: RRR, S1S2  Pulmonary: chest rise symmetric  Abdomen: soft, nontender, nondistended  Ext: perfusing well  Skin: warm, dry  Wound: suboccipital and posterior neck incision dressing c/d/i        DIET:  [] NPO  [x] Mechanical  [] Tube feeds    LABS:                        11.5   6.31  )-----------( 217      ( 01 Feb 2024 06:54 )             33.8     02-01    135  |  104  |  11  ----------------------------<  101<H>  3.7   |  25  |  0.57    Ca    8.9      01 Feb 2024 06:54  Phos  3.4     02-01  Mg     2.1     02-01      PT/INR - ( 31 Jan 2024 05:30 )   PT: 12.1 sec;   INR: 1.06          PTT - ( 31 Jan 2024 05:30 )  PTT:35.0 sec  Urinalysis Basic - ( 01 Feb 2024 06:54 )    Color: x / Appearance: x / SG: x / pH: x  Gluc: 101 mg/dL / Ketone: x  / Bili: x / Urobili: x   Blood: x / Protein: x / Nitrite: x   Leuk Esterase: x / RBC: x / WBC x   Sq Epi: x / Non Sq Epi: x / Bacteria: x          CAPILLARY BLOOD GLUCOSE          Drug Levels: [] N/A    CSF Analysis: [] N/A      Allergies    oxycodone (Other; Hypotension)    Intolerances      MEDICATIONS:  Antibiotics:  ceFAZolin   IVPB 2000 milliGRAM(s) IV Intermittent every 8 hours    Neuro:  acetaminophen     Tablet .. 1000 milliGRAM(s) Oral every 6 hours  HYDROmorphone   Tablet 2 milliGRAM(s) Oral every 4 hours PRN  methocarbamol 500 milliGRAM(s) Oral three times a day PRN  ondansetron Injectable 4 milliGRAM(s) IV Push every 6 hours PRN    Anticoagulation:    OTHER:  bisacodyl 5 milliGRAM(s) Oral at bedtime  influenza   Vaccine 0.5 milliLiter(s) IntraMuscular once  pantoprazole    Tablet 40 milliGRAM(s) Oral before breakfast  polyethylene glycol 3350 17 Gram(s) Oral two times a day  senna 2 Tablet(s) Oral at bedtime    IVF:    CULTURES:  Culture Results:   No growth to date. (01-31 @ 14:50)  Culture Results:   Testing in progress (01-31 @ 14:50)    RADIOLOGY & ADDITIONAL TESTS:    HISTORY OF CHIARIMALFORMATION    No pertinent family history in first degree relatives    Handoff    MEWS Score    No pertinent past medical history    Arachnoid cyst    History of Chiari malformation    History of Chiari malformation    History of Chiari malformation    History of Chiari malformation    Draining postoperative wound    Class II obesity    H/O breast surgery    History of Chiari malformation    Status post craniectomy    POST OP COMPLICATION    44    Draining postoperative wound    Class II obesity    [email or web address]        ASSESSMENT:  42yo F PMHx Chiari Malformation (s/p SOC, C1 laminectomy with duraplasty on 12/14/23), chronic headaches, presents to [facility] ED c/o SOC incisional clear drainage x 5 days. Pt started on Keflex 500mg TID, incision culture sent, and sutures placed in office on 1/25 for incisional drainage. Pt admitted to [facility] for infectious work-up, now s/p SOC wound washout (1/31/24).    NEURO:   - neuro and vitals checks q4hrs   - Pain control: tylenol, tramadol, dilaudid 0.5 for breakthrough  - CTH with IV contrast 1/29: 6.3 cm fluid collection containing air overlying the craniectomy site which may represent postsurgical changes/pseudomeningocele versus infection    CARDIO:  - SBP goal normotensive     PULM:   - on RA  - encourage incentive spirometery     GI:  - regular diet  - Continue home Protonix 40 daily   - bowel regimen    - last BM 1/29    RENAL:   - normonatremia goal   - IVL  - Voiding    ENDO:   - no active issues     ID:   - Ancef 2g q8   - f/u blood cultures and urine culture from admission & OR cultures     HEME:   - SCDs b/l LE DVT ppx     Dispo:   - regional status, full code, PT/OT rec no needs    D/w Dr. Price     Assessment:  Present when checked    []  GCS  E   V  M     Heart Failure: []Acute, [] acute on chronic , []chronic  Heart Failure:  [] Diastolic (HFpEF), [] Systolic (HFrEF), []Combined (HFpEF and HFrEF), [] RHF, [] Pulm HTN, [] Other    [] KANWAL, [] ATN, [] AIN, [] other  [] CKD1, [] CKD2, [] CKD 3, [] CKD 4, [] CKD 5, []ESRD    Encephalopathy: [] Metabolic, [] Hepatic, [] toxic, [] Neurological, [] Other    Abnormal Nurtitional Status: [] malnurtition (see nutrition note), [ ]underweight: BMI < 19, [] morbid obesity: BMI >40, [] Cachexia    [] Sepsis  [] hypovolemic shock,[] cardiogenic shock, [] hemorrhagic shock, [] neuogenic shock  [] Acute Respiratory Failure  []Cerebral edema, [] Brain compression/ herniation,   [] Functional quadriplegia  [] Acute blood loss anemia

## 2024-02-02 NOTE — PROGRESS NOTE ADULT - REASON FOR ADMISSION
FINAL PRIOR AUTHORIZATION STATUS:    1.  Name of Medication & Dose: Breo Ellipta 200-25mcg     2. Prior Auth Status: Denied.  Reason: Due to not medical necessity. See scanned in media    3. Action Taken: Pharmacy Notified: yes Patient Notified: yes  
fluid leaking from SOC crani incision

## 2024-02-02 NOTE — PROGRESS NOTE ADULT - SUBJECTIVE AND OBJECTIVE BOX
SUBJECTIVE: SAMI. Reports pain is better at back of her head but still present. Plan for DC Home today.    MEDICATIONS:  MEDICATIONS  (STANDING):  acetaminophen     Tablet .. 1000 milliGRAM(s) Oral every 6 hours  bisacodyl 5 milliGRAM(s) Oral at bedtime  ceFAZolin   IVPB 2000 milliGRAM(s) IV Intermittent every 8 hours  influenza   Vaccine 0.5 milliLiter(s) IntraMuscular once  pantoprazole    Tablet 40 milliGRAM(s) Oral before breakfast  polyethylene glycol 3350 17 Gram(s) Oral two times a day  senna 2 Tablet(s) Oral at bedtime    MEDICATIONS  (PRN):  HYDROmorphone   Tablet 2 milliGRAM(s) Oral every 4 hours PRN moderate or severe pain  methocarbamol 500 milliGRAM(s) Oral three times a day PRN Muscle Spasm  ondansetron Injectable 4 milliGRAM(s) IV Push every 6 hours PRN Nausea and/or Vomiting      Allergies    oxycodone (Other; Hypotension)    Intolerances        OBJECTIVE:  Vital Signs Last 24 Hrs  T(C): 37.2 (02 Feb 2024 09:11), Max: 37.2 (02 Feb 2024 09:11)  T(F): 98.9 (02 Feb 2024 09:11), Max: 98.9 (02 Feb 2024 09:11)  HR: 88 (02 Feb 2024 08:40) (72 - 96)  BP: 114/64 (02 Feb 2024 08:40) (108/54 - 120/67)  BP(mean): 84 (02 Feb 2024 08:40) (77 - 89)  RR: 18 (02 Feb 2024 08:40) (17 - 18)  SpO2: 98% (02 Feb 2024 08:40) (95% - 99%)    Parameters below as of 02 Feb 2024 08:40  Patient On (Oxygen Delivery Method): room air      I&O's Summary    01 Feb 2024 07:01  -  02 Feb 2024 07:00  --------------------------------------------------------  IN: 850 mL / OUT: 1400 mL / NET: -550 mL    02 Feb 2024 07:01  -  02 Feb 2024 11:44  --------------------------------------------------------  IN: 0 mL / OUT: 150 mL / NET: -150 mL        PHYSICAL EXAM:  Gen: appears stated age, resting comfortably, NAD  HEENT: occipital incision CDI, MMM  Neck: supple  CV: RRR, no m/r/g, peripheral pulses 2+  Pulm: CTAB, no increased work of breathing, no rales/rhonchi  Abd: soft, ND, NT, no rebound or guarding  Skin: warm and dry  Ext: non-tender, no edema  Neuro: AOx3, speaking in full sentences  Psych: affect and behavior appropriate, pleasant at time of interview    LABS:                        11.1   5.08  )-----------( 212      ( 02 Feb 2024 06:12 )             32.9     02-02    135  |  104  |  9   ----------------------------<  88  3.7   |  23  |  0.52    Ca    9.0      02 Feb 2024 06:12  Phos  3.8     02-02  Mg     2.1     02-02    TPro  6.0  /  Alb  3.4  /  TBili  0.2  /  DBili  x   /  AST  24  /  ALT  25  /  AlkPhos  66  02-02    LIVER FUNCTIONS - ( 02 Feb 2024 06:12 )  Alb: 3.4 g/dL / Pro: 6.0 g/dL / ALK PHOS: 66 U/L / ALT: 25 U/L / AST: 24 U/L / GGT: x             CAPILLARY BLOOD GLUCOSE        Urinalysis Basic - ( 02 Feb 2024 06:12 )    Color: x / Appearance: x / SG: x / pH: x  Gluc: 88 mg/dL / Ketone: x  / Bili: x / Urobili: x   Blood: x / Protein: x / Nitrite: x   Leuk Esterase: x / RBC: x / WBC x   Sq Epi: x / Non Sq Epi: x / Bacteria: x        MICRODATA:    Culture - Fungal, Other (collected 31 Jan 2024 14:50)  Source: .Other #3 epidural  Preliminary Report (01 Feb 2024 08:05):    Testing in progress    Culture - Surgical Swab (collected 31 Jan 2024 14:50)  Source: .Surgical Swab #3 epidural  Gram Stain (31 Jan 2024 19:19):    No organisms seen    Few White blood cells  Preliminary Report (01 Feb 2024 09:07):    No growth to date.    Culture - Fungal, Other (collected 31 Jan 2024 14:50)  Source: .Other #2 epidural  Preliminary Report (01 Feb 2024 08:05):    Testing in progress    Culture - Surgical Swab (collected 31 Jan 2024 14:50)  Source: .Surgical Swab #2 epidural  Gram Stain (31 Jan 2024 19:19):    No organisms seen    Few White blood cells  Preliminary Report (01 Feb 2024 09:08):    No growth to date.    Culture - Fungal, Other (collected 31 Jan 2024 14:50)  Source: .Other #1 superficial wound  Preliminary Report (01 Feb 2024 08:05):    Testing in progress    Culture - Surgical Swab (collected 31 Jan 2024 14:50)  Source: .Surgical Swab #1 superficial wound  Gram Stain (31 Jan 2024 19:19):    No organisms seen    Few White blood cells  Preliminary Report (01 Feb 2024 09:09):    No growth to date.        RADIOLOGY/OTHER STUDIES:

## 2024-02-02 NOTE — DISCHARGE NOTE NURSING/CASE MANAGEMENT/SOCIAL WORK - NSDCFUADDAPPT_GEN_ALL_CORE_FT
Please follow up with Dr. Price on 2/12 at 8:45AM, call the office to confirm / make changes to appointment at [phone]    Please follow up with your primary care doctor

## 2024-02-03 LAB
CULTURE RESULTS: SIGNIFICANT CHANGE UP
CULTURE RESULTS: SIGNIFICANT CHANGE UP
SPECIMEN SOURCE: SIGNIFICANT CHANGE UP
SPECIMEN SOURCE: SIGNIFICANT CHANGE UP

## 2024-02-04 LAB
CULTURE RESULTS: NO GROWTH — SIGNIFICANT CHANGE UP
SPECIMEN SOURCE: SIGNIFICANT CHANGE UP

## 2024-02-06 DIAGNOSIS — Z86.69 PERSONAL HISTORY OF OTHER DISEASES OF THE NERVOUS SYSTEM AND SENSE ORGANS: ICD-10-CM
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
## 2024-02-06 DIAGNOSIS — G06.0 INTRACRANIAL ABSCESS AND GRANULOMA: ICD-10-CM

## 2024-02-06 DIAGNOSIS — Z88.5 ALLERGY STATUS TO NARCOTIC AGENT: ICD-10-CM

## 2024-02-06 DIAGNOSIS — K21.9 GASTRO-ESOPHAGEAL REFLUX DISEASE WITHOUT ESOPHAGITIS: ICD-10-CM

## 2024-02-06 DIAGNOSIS — R51.9 HEADACHE, UNSPECIFIED: ICD-10-CM

## 2024-02-06 DIAGNOSIS — T81.31XA DISRUPTION OF EXTERNAL OPERATION (SURGICAL) WOUND, NOT ELSEWHERE CLASSIFIED, INITIAL ENCOUNTER: ICD-10-CM

## 2024-02-06 DIAGNOSIS — E66.9 OBESITY, UNSPECIFIED: ICD-10-CM

## 2024-02-06 DIAGNOSIS — G97.82 OTHER POSTPROCEDURAL COMPLICATIONS AND DISORDERS OF NERVOUS SYSTEM: ICD-10-CM

## 2024-02-06 DIAGNOSIS — T81.42XA INFECTION FOLLOWING A PROCEDURE, DEEP INCISIONAL SURGICAL SITE, INITIAL ENCOUNTER: ICD-10-CM

## 2024-02-12 ENCOUNTER — APPOINTMENT (OUTPATIENT)
Dept: NEUROSURGERY | Facility: CLINIC | Age: 44
End: 2024-02-12
Payer: MEDICAID

## 2024-02-12 VITALS
WEIGHT: 225 LBS | BODY MASS INDEX: 38.41 KG/M2 | RESPIRATION RATE: 18 BRPM | TEMPERATURE: 97.2 F | HEIGHT: 64 IN | OXYGEN SATURATION: 99 % | HEART RATE: 76 BPM | SYSTOLIC BLOOD PRESSURE: 126 MMHG | DIASTOLIC BLOOD PRESSURE: 75 MMHG

## 2024-02-12 DIAGNOSIS — G93.5 COMPRESSION OF BRAIN: ICD-10-CM

## 2024-02-12 DIAGNOSIS — M54.2 CERVICALGIA: ICD-10-CM

## 2024-02-12 PROCEDURE — 99024 POSTOP FOLLOW-UP VISIT: CPT

## 2024-02-12 RX ORDER — LIDOCAINE 40 MG/G
4 PATCH TOPICAL DAILY
Qty: 14 | Refills: 0 | Status: ACTIVE | COMMUNITY
Start: 2024-02-12 | End: 1900-01-01

## 2024-02-12 NOTE — ASSESSMENT
[FreeTextEntry1] : Staples removed at today's visit; sutures in place. She will return on 2/22 for suture removal. OR cultures show no growth to date. Recommend keflex until Wednesday, 2/14 then can stop (2 weeks postop) Shower daily. Wash hair with mild shampoo, i.e. Buster and Buster.  Pat incision line dry with dry separate towel. Report all s/s infection including drainage, redness, warmth, fever, weakness, chills. Follow-up with Dr. Price in 2 weeks for post-op visit. No tub baths/pools for 8 weeks. Keep incision covered and protected from sun for 3-6 months following surgery.  Patient verbalizes agreement and understanding with plan.

## 2024-02-12 NOTE — REASON FOR VISIT
[Family Member] : family member [de-identified] : Suboccipital craniectomy, C1 laminectomy for Chiari Malformation decompression; use of operating exoscope on 12/14/23; suboccipital-infratentorial craniectomy for evacuation of epidural abscess on 1/31/24 [de-identified] : 1/31/24 [de-identified] : She underwent craniotomy for evacuation of epidural abscess. She was discharged home on keflex 500 mg tid.  Cultures demonstrate no growth.

## 2024-02-12 NOTE — PHYSICAL EXAM
[General Appearance - Alert] : alert [General Appearance - In No Acute Distress] : in no acute distress [Clean] : clean [Dry] : dry [Intact] : intact [No Drainage] : without drainage [Normal Skin] : normal [FreeTextEntry1] : Posterior head [FreeTextEntry6] : staples removed without difficulty;  sutures in place [Oriented To Time, Place, And Person] : oriented to person, place, and time [Motor Tone] : muscle tone was normal in all four extremities [Motor Strength] : muscle strength was normal in all four extremities [Sclera] : the sclera and conjunctiva were normal [Outer Ear] : the ears and nose were normal in appearance [Neck Appearance] : the appearance of the neck was normal [] : no respiratory distress [Skin Color & Pigmentation] : normal skin color and pigmentation

## 2024-02-22 ENCOUNTER — APPOINTMENT (OUTPATIENT)
Dept: NEUROSURGERY | Facility: CLINIC | Age: 44
End: 2024-02-22
Payer: MEDICAID

## 2024-02-22 VITALS
BODY MASS INDEX: 38.41 KG/M2 | TEMPERATURE: 98 F | SYSTOLIC BLOOD PRESSURE: 103 MMHG | HEIGHT: 64 IN | OXYGEN SATURATION: 99 % | RESPIRATION RATE: 18 BRPM | DIASTOLIC BLOOD PRESSURE: 72 MMHG | WEIGHT: 225 LBS | HEART RATE: 90 BPM

## 2024-02-22 DIAGNOSIS — Z09 ENCOUNTER FOR FOLLOW-UP EXAMINATION AFTER COMPLETED TREATMENT FOR CONDITIONS OTHER THAN MALIGNANT NEOPLASM: ICD-10-CM

## 2024-02-22 PROCEDURE — 99024 POSTOP FOLLOW-UP VISIT: CPT

## 2024-02-22 NOTE — REASON FOR VISIT
[Family Member] : family member [de-identified] : 1/31/24 [de-identified] : Suboccipital craniectomy, C1 laminectomy for Chiari Malformation decompression; use of operating exoscope on 12/14/23; suboccipital-infratentorial craniectomy for evacuation of epidural abscess on 1/31/24 [de-identified] : She underwent craniotomy for evacuation of epidural abscess. She was discharged home on keflex 500 mg tid. She has stopped keflex. No longer taking pain meds. Denies drainage. Denies fever, chills.  Cultures demonstrate no growth.

## 2024-02-22 NOTE — HISTORY OF PRESENT ILLNESS
[FreeTextEntry1] : 43 year old woman with history of Chiari malformation who is well known to our practice. She initially established care with Dr. Price in 2018 due to findings of Chiari malformation and arachnoid cyst. Due to severe headaches consistent with increased pressure, suboccipital decompression was recommended but she was unable to proceed with surgery at the time.  She returns today due to worsening symptoms. She states headaches are becoming more frequent. Headaches are worsened by coughing, laughing, bending, sneezing. Denies changes in vision. Reports intermittent numbness/tingling bilateral legs. She also reports imbalance.   Denies visual changes, nausea, vomiting, dizziness.

## 2024-02-22 NOTE — ASSESSMENT
[FreeTextEntry1] : Staples removed at today's visit; sutures removed without difficulty. No evidence of drainage noted. Incision healing well. OR cultures show no growth to date. Shower daily. Wash hair with mild shampoo, i.e. Buster and Buster.  Pat incision line dry with dry separate towel. Report all s/s infection including drainage, redness, warmth, fever, weakness, chills. Follow-up with Dr. Price in 2 weeks for post-op visit. No tub baths/pools for 8 weeks. Keep incision covered and protected from sun for 3-6 months following surgery. Follow up for wound check in one month  Patient verbalizes agreement and understanding with plan.

## 2024-02-22 NOTE — PHYSICAL EXAM
[General Appearance - In No Acute Distress] : in no acute distress [General Appearance - Alert] : alert [Dry] : dry [Clean] : clean [Intact] : intact [No Drainage] : without drainage [Normal Skin] : normal [FreeTextEntry1] : Posterior head [Oriented To Time, Place, And Person] : oriented to person, place, and time [FreeTextEntry6] : sutures removed without difficulty [Motor Tone] : muscle tone was normal in all four extremities [Sclera] : the sclera and conjunctiva were normal [Motor Strength] : muscle strength was normal in all four extremities [Outer Ear] : the ears and nose were normal in appearance [] : no respiratory distress [Neck Appearance] : the appearance of the neck was normal [Skin Color & Pigmentation] : normal skin color and pigmentation

## 2024-03-02 LAB
CULTURE RESULTS: SIGNIFICANT CHANGE UP
SPECIMEN SOURCE: SIGNIFICANT CHANGE UP

## 2024-03-15 ENCOUNTER — APPOINTMENT (OUTPATIENT)
Dept: NEUROSURGERY | Facility: CLINIC | Age: 44
End: 2024-03-15
Payer: MEDICAID

## 2024-03-15 VITALS
HEART RATE: 83 BPM | OXYGEN SATURATION: 98 % | SYSTOLIC BLOOD PRESSURE: 122 MMHG | TEMPERATURE: 97.4 F | BODY MASS INDEX: 38.41 KG/M2 | DIASTOLIC BLOOD PRESSURE: 75 MMHG | HEIGHT: 64 IN | WEIGHT: 225 LBS | RESPIRATION RATE: 18 BRPM

## 2024-03-15 PROCEDURE — 99024 POSTOP FOLLOW-UP VISIT: CPT

## 2024-03-15 RX ORDER — GABAPENTIN 100 MG/1
100 CAPSULE ORAL 3 TIMES DAILY
Qty: 90 | Refills: 0 | Status: ACTIVE | COMMUNITY
Start: 2024-03-15 | End: 1900-01-01

## 2024-03-19 ENCOUNTER — APPOINTMENT (OUTPATIENT)
Dept: GYNECOLOGIC ONCOLOGY | Facility: CLINIC | Age: 44
End: 2024-03-19

## 2024-03-25 NOTE — ASSESSMENT
[FreeTextEntry1] : Staples removed at today's visit; sutures removed without difficulty. No evidence of drainage noted. Incision healing well. OR cultures show no growth.  Shower daily. Wash hair with mild shampoo, i.e. Buster and Buster.  Pat incision line dry with dry separate towel. Report all s/s infection including drainage, redness, warmth, fever, weakness, chills. Follow-up MRI without contrast in June 2024. No tub baths/pools for 8 weeks. Keep incision covered and protected from sun for 3-6 months following surgery.   Patient verbalizes agreement and understanding with plan.

## 2024-03-25 NOTE — REASON FOR VISIT
[Family Member] : family member [de-identified] : Suboccipital craniectomy, C1 laminectomy for Chiari Malformation decompression 12/14/23; use of operating exoscope on 12/14/23; suboccipital-infratentorial craniectomy for evacuation of epidural abscess on 1/31/24

## 2024-03-25 NOTE — HISTORY OF PRESENT ILLNESS
[FreeTextEntry1] : 43 year old woman with history of Chiari malformation who is well known to our practice. She initially established care with Dr. Price in 2018 due to findings of Chiari malformation and arachnoid cyst. Due to severe headaches consistent with increased pressure, suboccipital decompression was recommended but she was unable to proceed with surgery at the time.  She is now s/p CHiari decompression 12//14/23. Her postop course was notable for wound dehiscence/leak requiring washout 1/31/24.  She was discharged home on keflex 500 mg tid. She has stopped keflex. No longer taking pain meds.   Cultures demonstrate no growth.  She comes in today for routine postop visit. Denies drainage. Denies fever, chills. Continues to take tramadol at night.

## 2024-03-25 NOTE — PHYSICAL EXAM
[General Appearance - Alert] : alert [General Appearance - In No Acute Distress] : in no acute distress [Clean] : clean [Dry] : dry [No Drainage] : without drainage [Intact] : intact [Normal Skin] : normal [Oriented To Time, Place, And Person] : oriented to person, place, and time [Motor Tone] : muscle tone was normal in all four extremities [Motor Strength] : muscle strength was normal in all four extremities [Sclera] : the sclera and conjunctiva were normal [Outer Ear] : the ears and nose were normal in appearance [Neck Appearance] : the appearance of the neck was normal [] : no respiratory distress [Skin Color & Pigmentation] : normal skin color and pigmentation [FreeTextEntry1] : Posterior head [FreeTextEntry6] : sutures removed without difficulty

## 2024-04-02 ENCOUNTER — APPOINTMENT (OUTPATIENT)
Dept: GYNECOLOGIC ONCOLOGY | Facility: CLINIC | Age: 44
End: 2024-04-02
Payer: MEDICAID

## 2024-04-02 VITALS
SYSTOLIC BLOOD PRESSURE: 108 MMHG | TEMPERATURE: 97.1 F | BODY MASS INDEX: 38.24 KG/M2 | WEIGHT: 224 LBS | HEART RATE: 89 BPM | OXYGEN SATURATION: 96 % | DIASTOLIC BLOOD PRESSURE: 69 MMHG | HEIGHT: 64 IN

## 2024-04-02 DIAGNOSIS — Z12.4 ENCOUNTER FOR SCREENING FOR MALIGNANT NEOPLASM OF CERVIX: ICD-10-CM

## 2024-04-02 DIAGNOSIS — N92.6 IRREGULAR MENSTRUATION, UNSPECIFIED: ICD-10-CM

## 2024-04-02 PROCEDURE — 99396 PREV VISIT EST AGE 40-64: CPT

## 2024-04-02 RX ORDER — METFORMIN HYDROCHLORIDE 500 MG/1
500 TABLET, COATED ORAL TWICE DAILY
Qty: 60 | Refills: 11 | Status: COMPLETED | COMMUNITY
Start: 2022-11-03 | End: 2024-04-02

## 2024-04-02 NOTE — HISTORY OF PRESENT ILLNESS
[FreeTextEntry1] :  Problem 1) Intermenstrual bleeding/irregular menses c/f PCOS     a) stopped OCP due to stomach issues 2) Chronic yeast infections 3) remote history of 2010  previous work up:  1) Pelvic US 23    wnl, endometrial lining 3mm    multiple follicles in ovaries c/f PCOS   Here for anual exam. Still having chronic yeast infections - takes monistat which helps symptoms but only for a few days and she has to use it again. Gets itching and abnormal discharge whenever she has sex her partner. Noticed a new dark spot where she had VIN3 previously and had it biopsied by her dermatologist last week. Going back this week for results. Underwent Chiari decompression  with Dr. Price. Feels like she needs to urinate constantly, doesnt hurt but there isn't much to void. She had a sling after her last vaginal delivery because of these symptoms but they returned a few years ago.   GYNHx: 2010 OBHx: x3 PMHx: denies PSHx: breast surgery  Med: none All: none no toxic habits

## 2024-04-02 NOTE — DISCUSSION/SUMMARY
[FreeTextEntry1] : Recurrent yeast infections  - never confirmed on vaginitis panel but symptoms c/w yeast. No evidence of infection today on exam  - Vaginitis panel sent today   - Very bothersome itching and discharge symptoms to patient   - reviewed lifestyle modifications in detail, patient already doing without improvement  - Trial of Diflucan weekly with a dose after intercourse  VIN3 2010, no vulvar issues on exam today   - patient will let me know results of biopsy from last week  Urinary frequency without dysuria. History of sling []Urine culture today [] Referral to Uro-Gyn  HCM - Had mammogram this month, normal per patient  Cervical cancer screening - pap/hpv test done today

## 2024-04-03 LAB
BV BACTERIA RRNA VAG QL NAA+PROBE: NOT DETECTED
C GLABRATA RNA VAG QL NAA+PROBE: NOT DETECTED
C TRACH RRNA SPEC QL NAA+PROBE: NOT DETECTED
CANDIDA RRNA VAG QL PROBE: NOT DETECTED
N GONORRHOEA RRNA SPEC QL NAA+PROBE: NOT DETECTED
T VAGINALIS RRNA SPEC QL NAA+PROBE: NOT DETECTED

## 2024-04-03 RX ORDER — FLUCONAZOLE 150 MG/1
150 TABLET ORAL
Qty: 12 | Refills: 11 | Status: ACTIVE | COMMUNITY
Start: 2022-11-03 | End: 1900-01-01

## 2024-04-04 LAB
BACTERIA UR CULT: NORMAL
HPV HIGH+LOW RISK DNA PNL CVX: NOT DETECTED

## 2024-04-08 ENCOUNTER — TRANSCRIPTION ENCOUNTER (OUTPATIENT)
Age: 44
End: 2024-04-08

## 2024-04-08 LAB — CYTOLOGY CVX/VAG DOC THIN PREP: NORMAL

## 2024-04-18 ENCOUNTER — APPOINTMENT (OUTPATIENT)
Dept: UROGYNECOLOGY | Facility: CLINIC | Age: 44
End: 2024-04-18
Payer: MEDICAID

## 2024-04-18 VITALS
WEIGHT: 221 LBS | OXYGEN SATURATION: 99 % | BODY MASS INDEX: 37.93 KG/M2 | HEART RATE: 72 BPM | SYSTOLIC BLOOD PRESSURE: 121 MMHG | DIASTOLIC BLOOD PRESSURE: 74 MMHG

## 2024-04-18 PROCEDURE — 99204 OFFICE O/P NEW MOD 45 MIN: CPT | Mod: 25

## 2024-04-18 PROCEDURE — 51701 INSERT BLADDER CATHETER: CPT

## 2024-04-18 NOTE — ASSESSMENT
[FreeTextEntry1] : 43 pe  oab UI  sp sling   reviewed dysfynctional voiding  We discussed possible etiologies of her symptoms including overactive bladder. I recommend she start behavioral modifications and bladder training. Written instructions on how to perform bladder training were provided.  She will try this for 4-6 weeks. We reviewed medications for overactive bladder.  If she has no significant improvement in her symptoms she will try adding an anticholinergic medication. Risks and side effects reviewed extensively. She will RTO in 10-12 weeks for follow up or sooner if issues arise.  If she has no improvement in symptoms, will proceed with further workup including urodynamic testing and cystoscopy.  rtc for urd

## 2024-04-18 NOTE — HISTORY OF PRESENT ILLNESS
[FreeTextEntry1] :    The patient is a  43year P 3with complaints of  urinary frequency  hx of sling 4 y ago heidi She  has  urinary leakage She feels incomplete bladder emptying She voids every _7x douhble voiding _ Denies nocturia.  Her liquid intake consists of water 1 cup coffee She has a BM q nl no constipation She denies gross hematuria, hx of nephrolithiasis, vaginal bulge or recurrent UTIs Fecal Inc no Sexually active y worried about ui Her last pap was 2024   s Denies abdominal surgical history She has no history of bruising, excessive bleeding, peteciae.

## 2024-04-18 NOTE — OB HISTORY
[Total Preg ___] : : [unfilled] [Living ___] : [unfilled] (living) [Vaginal ___] : [unfilled] vaginal delivery(s) [Urinary Incontinence w/[place]] : urinary incontinence with intercourse

## 2024-04-18 NOTE — PHYSICAL EXAM
[No Acute Distress] : in acute distress [Well developed] : well developed [Well Nourished] : ~L well nourished [Good Hygeine] : demonstrates good hygeine [Oriented x3] : oriented to person, place, and time [Normal Memory] : ~T memory was ~L unimpaired [Normal Mood/Affect] : mood and affect are normal [Anxiety] : patient is anxious [Mass (___ Cm)] : no ~M [unfilled] abdominal mass was palpated [Tenderness] : ~T no ~M abdominal tenderness observed [Distended] : not distended [H/Smegaly] : no hepatosplenomegaly [Hernia] : no hernia observed [Scar] : no scars [No Lesions] : no lesions were seen on the external genitalia [Labia Majora] : were normal [Labia Minora] : were normal [Bartholin's Gland] : both Bartholin's glands were normal  [Normal Appearance] : general appearance was normal [No Bleeding] : there was no active vaginal bleeding [Normal] : no abnormalities [Post Void Residual ____ml] : post void residual was [unfilled] ml [FreeTextEntry3] : cst neg [de-identified] : no pop

## 2024-04-18 NOTE — PROCEDURE
[Straight Catheterization] : insertion of a straight catheter [Urgent Incontinence] : urgent incontinence [Patient] : the patient [None] : there were no complications with the catheter insertion [Clear] : clear [No Complications] : no complications [Tolerated Well] : the patient tolerated the procedure well [Post procedure instructions and information given] : Post procedure instructions and information were given and reviewed with patient.

## 2024-04-18 NOTE — REASON FOR VISIT
[Initial Visit ___] : an initial visit for [unfilled] [Urinary Incontinence] : urinary incontinence [Urine Frequency] : urine frequency [Urinary Urgency] : urinary urgency [Cannot Empty Bladder] : cannot empty bladder

## 2024-04-26 ENCOUNTER — TRANSCRIPTION ENCOUNTER (OUTPATIENT)
Age: 44
End: 2024-04-26

## 2024-05-01 ENCOUNTER — APPOINTMENT (OUTPATIENT)
Dept: UROGYNECOLOGY | Facility: CLINIC | Age: 44
End: 2024-05-01
Payer: MEDICAID

## 2024-05-01 VITALS — HEART RATE: 89 BPM | OXYGEN SATURATION: 98 % | SYSTOLIC BLOOD PRESSURE: 125 MMHG | DIASTOLIC BLOOD PRESSURE: 66 MMHG

## 2024-05-01 DIAGNOSIS — R35.0 FREQUENCY OF MICTURITION: ICD-10-CM

## 2024-05-01 PROCEDURE — 51798 US URINE CAPACITY MEASURE: CPT

## 2024-05-01 PROCEDURE — 51741 ELECTRO-UROFLOWMETRY FIRST: CPT

## 2024-05-01 PROCEDURE — 51784 ANAL/URINARY MUSCLE STUDY: CPT

## 2024-05-01 PROCEDURE — 51797 INTRAABDOMINAL PRESSURE TEST: CPT

## 2024-05-01 PROCEDURE — 51729 CYSTOMETROGRAM W/VP&UP: CPT

## 2024-05-07 ENCOUNTER — NON-APPOINTMENT (OUTPATIENT)
Age: 44
End: 2024-05-07

## 2024-05-07 RX ORDER — METHYLPREDNISOLONE 4 MG/1
4 TABLET ORAL
Qty: 1 | Refills: 0 | Status: ACTIVE | COMMUNITY
Start: 2024-05-07 | End: 1900-01-01

## 2024-05-08 PROBLEM — R35.0 URINARY FREQUENCY: Status: ACTIVE | Noted: 2024-04-02

## 2024-05-09 ENCOUNTER — TRANSCRIPTION ENCOUNTER (OUTPATIENT)
Age: 44
End: 2024-05-09

## 2024-05-21 ENCOUNTER — APPOINTMENT (OUTPATIENT)
Dept: UROGYNECOLOGY | Facility: CLINIC | Age: 44
End: 2024-05-21

## 2024-06-14 ENCOUNTER — APPOINTMENT (OUTPATIENT)
Dept: BREAST CENTER | Facility: CLINIC | Age: 44
End: 2024-06-14
Payer: MEDICAID

## 2024-06-14 VITALS
WEIGHT: 222 LBS | SYSTOLIC BLOOD PRESSURE: 113 MMHG | HEIGHT: 64 IN | HEART RATE: 105 BPM | DIASTOLIC BLOOD PRESSURE: 65 MMHG | BODY MASS INDEX: 37.9 KG/M2

## 2024-06-14 DIAGNOSIS — R92.30 DENSE BREASTS, UNSPECIFIED: ICD-10-CM

## 2024-06-14 DIAGNOSIS — Z80.3 FAMILY HISTORY OF MALIGNANT NEOPLASM OF BREAST: ICD-10-CM

## 2024-06-14 DIAGNOSIS — D24.2 BENIGN NEOPLASM OF LEFT BREAST: ICD-10-CM

## 2024-06-14 DIAGNOSIS — R92.8 OTHER ABNORMAL AND INCONCLUSIVE FINDINGS ON DIAGNOSTIC IMAGING OF BREAST: ICD-10-CM

## 2024-06-14 PROCEDURE — 99213 OFFICE O/P EST LOW 20 MIN: CPT

## 2024-06-14 NOTE — HISTORY OF PRESENT ILLNESS
[FreeTextEntry1] : Kimberly is a 43 year old female who presents for follow up with reports of bilateral breast pain and abnormal breast imaging (BI-RADS 3). She has a history of an excisional biopsy for a mass 5:00 2cmFN fibroadenoma on 3/19/2021 final surgical pathology was benign. Pt denies palpable masses, skin lesions/changes, nipple discharge.   BIB lifetime risk is 16.8% maternal aunt has a history of breast cancer

## 2024-06-14 NOTE — PHYSICAL EXAM
[Normocephalic] : normocephalic [Atraumatic] : atraumatic [Supple] : supple [No Supraclavicular Adenopathy] : no supraclavicular adenopathy [Examined in the supine and seated position] : examined in the supine and seated position [Symmetrical] : symmetrical [No Nipple Retraction] : no left nipple retraction [No Nipple Discharge] : no left nipple discharge [No Axillary Lymphadenopathy] : no left axillary lymphadenopathy [No Edema] : no edema [No Rashes] : no rashes [No Ulceration] : no ulceration [No dominant masses] : no dominant masses in right breast  [No dominant masses] : no dominant masses left breast [Breast Nipple Inversion] : nipples not inverted [Breast Nipple Retraction] : nipples not retracted [Breast Nipple Flattening] : nipples not flattened [Breast Nipple Fissures] : nipples not fissured [de-identified] : incisions well healed

## 2024-06-14 NOTE — DATA REVIEWED
[FreeTextEntry1] : 11/19/2020 ([facility]) bilateral breast US showing a mass in left breast 5:00 2cmFN, which underwent a benign biopsy at outside facility 2/5/2014 demonstrating interval enlargement. Further evaluation with US guided biopsy is recommended. 6 month follow up left breast US recommended for 2 probably benign masses in left breast at 1:00 1cmFN and 10:00 2cmFN. BIRADS 4   11/30/2020 ([facility]) left breast 5:00 2cmFN US guided biopsy pathology fibroadenoma. Benign, concordant recommended 6 month follow up left breast US.   12/25/2020 ([facility]) HBOC panel negative results   3/19/2021 ([facility]) let breast lesion: fibroadenoma, prior biopsy site changes   6/11/21 ([facility]) B/l breast US: benign appearing nodules bilaterally. The nodule at 2:00 measuring 0.7cm, is a new finding, benign and similar in appearance to the other nodules that are present. 6-month f/u US is recommended. BI-RADS 3.   12/8/21 ([facility]) b/l US: no e/o malignancy. Stable morphologically benign appearing circumscribed nodules. BI-RADS 2.   3/6/24 ([facility]) b/l screening mammo and US: heterogeneously dense. Benign findings. No evidence of malignancy. BI-RADS 2.  3/14/24 ([facility]) b/l screening mammo: extremely dense. No evidence of malignancy. BI-RADS 1.   5/8/24 ([facility]) b/l US: Stable masses bilaterally, likely fibroadenoma.  6-month follow-up targeted ultrasound is recommended for probably benign lesions at the right breast 2:00 and left 7:00.  BI-RADS 3

## 2024-06-14 NOTE — ASSESSMENT
[FreeTextEntry1] : 42 yo female presents for ongoing mastalgia and abnormal imaging.   Discussed etiologies of breast pain including hormonal changes, scar tissue, benign entities such as cysts, reactive lymph nodes, musculoskeletal issues and rarely malignancy. Reviewed the use of OTC Ibuprofen, warm/cold compresses, stretching, wearing a sports bra, Salon Pas patches.  Reviewed the results of her recent bilateral ultrasound which identifies probably benign masses in the bilateral breast for which 6-month follow-up was recommended.  Will get that set up for November and if stable plan on annual screening mammogram and ultrasound in March and RTC after for re-examination.   All questions were answered; the patient verbalized understanding and is in agreement with the plan.

## 2024-06-20 ENCOUNTER — APPOINTMENT (OUTPATIENT)
Dept: ENDOCRINOLOGY | Facility: CLINIC | Age: 44
End: 2024-06-20
Payer: MEDICAID

## 2024-06-20 VITALS
SYSTOLIC BLOOD PRESSURE: 108 MMHG | DIASTOLIC BLOOD PRESSURE: 62 MMHG | BODY MASS INDEX: 37.59 KG/M2 | HEART RATE: 103 BPM | WEIGHT: 219 LBS

## 2024-06-20 DIAGNOSIS — E66.9 OBESITY, UNSPECIFIED: ICD-10-CM

## 2024-06-20 PROCEDURE — 99205 OFFICE O/P NEW HI 60 MIN: CPT

## 2024-06-20 RX ORDER — SEMAGLUTIDE 0.25 MG/.5ML
0.25 INJECTION, SOLUTION SUBCUTANEOUS
Qty: 1 | Refills: 3 | Status: ACTIVE | COMMUNITY
Start: 2024-06-20 | End: 1900-01-01

## 2024-06-21 LAB
ALBUMIN SERPL ELPH-MCNC: 4.2 G/DL
ALP BLD-CCNC: 73 U/L
ALT SERPL-CCNC: 17 U/L
ANION GAP SERPL CALC-SCNC: 15 MMOL/L
AST SERPL-CCNC: 17 U/L
BILIRUB SERPL-MCNC: 0.2 MG/DL
BUN SERPL-MCNC: 19 MG/DL
CALCIUM SERPL-MCNC: 9.9 MG/DL
CHLORIDE SERPL-SCNC: 106 MMOL/L
CHOLEST SERPL-MCNC: 181 MG/DL
CO2 SERPL-SCNC: 21 MMOL/L
CREAT SERPL-MCNC: 0.73 MG/DL
EGFR: 106 ML/MIN/1.73M2
ESTIMATED AVERAGE GLUCOSE: 120 MG/DL
GLUCOSE SERPL-MCNC: 96 MG/DL
HBA1C MFR BLD HPLC: 5.8 %
HDLC SERPL-MCNC: 57 MG/DL
LDLC SERPL CALC-MCNC: 101 MG/DL
NONHDLC SERPL-MCNC: 124 MG/DL
POTASSIUM SERPL-SCNC: 4.3 MMOL/L
PROT SERPL-MCNC: 6.9 G/DL
SODIUM SERPL-SCNC: 143 MMOL/L
TRIGL SERPL-MCNC: 135 MG/DL
TSH SERPL-ACNC: 1.96 UIU/ML

## 2024-06-21 NOTE — ASSESSMENT
[Weight Loss] : weight loss [Carbohydrate Consistent Diet] : carbohydrate consistent diet [Importance of Diet and Exercise] : importance of diet and exercise to improve glycemic control, achieve weight loss and improve cardiovascular health [FreeTextEntry1] : apob.  She has hx of prediabetes and PCOS. Onset of weight gain 2012 ~100 lbs. She has not attempted any special diet for weight reduction. Pt endorses diarrhea while on Metformin for PCOS. An overview on obesity and targets discussed.  Plan: - Refer to RD. - Initiate Wegovy Reutrn in: 10 weeks.

## 2024-06-23 PROBLEM — E66.9 OBESITY, CLASS II, BMI 35-39.9: Status: ACTIVE | Noted: 2024-06-20

## 2024-06-24 NOTE — REASON FOR VISIT
[Initial Evaluation] : an initial evaluation [Weight Management/Obesity] : weight management/obesity [FreeTextEntry2] : CASSANDRA NAYLOR,

## 2024-06-24 NOTE — HISTORY OF PRESENT ILLNESS
[FreeTextEntry1] : 43 year old F pt, with Hx of Obesity, referred by CASSANDRA NAYLOR, FAX [phone] presents today to establish endocrine care. Other PMHx: symptoms of PCOS, prediabetes (09/2021 A1c 5.7%), asthma, Chiari malformation  No PMHx of: fatty liver disease, DAVEY, HTN, depression, HLD PSHx: Hernia repair, removed benign cyst breast, 12/14/23 Suboccipital craniectomy- C1 laminectomy for Chiari Malformation decompression  SHx: No smoking, no EtOH NKDA Not currently working. Eldest child, 2007, second child 2012.   06/20/2024 Pt has BMI 37.59 Pt endorses a steady weight of 145 lbs for most of her life, but started gaining weight in 2012 after her second child. She is currently 219 lbs. She reports eating healthy and has not tried any specific weight loss programs.  Pt endorses fatigue and would like to lose weight in order to improve overall health and prevent possible complications. Recently prescribed 1 week of steroids. Pt states that she was prescribed Metformin for PCOS in the past, and had to discontinue due to GI effects.    [Medications verified as per pt on 06/20/2024] Current Medications: Tylenol, Gabapentin  Medication modified/added this visit: Initiate Wegovy 0.25 mg qw.

## 2024-06-24 NOTE — PHYSICAL EXAM
[No Acute Distress] : no acute distress [Normal Sclera/Conjunctiva] : normal sclera/conjunctiva [Normal Outer Ear/Nose] : the ears and nose were normal in appearance [Thyroid Not Enlarged] : the thyroid was not enlarged [No Thyroid Nodules] : no palpable thyroid nodules [Clear to Auscultation] : lungs were clear to auscultation bilaterally [Normal Rate] : heart rate was normal [No Edema] : no peripheral edema [Soft] : abdomen soft [Spine Straight] : spine straight [No Stigmata of Cushings Syndrome] : no stigmata of Cushings Syndrome [Normal Gait] : normal gait [Normal Strength/Tone] : muscle strength and tone were normal [No Rash] : no rash [Normal Reflexes] : deep tendon reflexes were 2+ and symmetric [No Tremors] : no tremors [Oriented x3] : oriented to person, place, and time [Kyphosis] : no kyphosis present [Acanthosis Nigricans] : no acanthosis nigricans [de-identified] : No buffalo hump

## 2024-06-24 NOTE — END OF VISIT
[Time Spent: ___ minutes] : I have spent [unfilled] minutes of time on the encounter. [FreeTextEntry3] :  All medical record entries made by the Scribe were at my, Dr. Dragan Ramirez, direction and personally dictated by me on 06/20/2024. I have reviewed the chart and agree that the record accurately reflects my personal performance of the history, physical exam, assessment and plan. I have also personally directed, reviewed and agreed with the chart.

## 2024-06-24 NOTE — ADDENDUM
[FreeTextEntry1] : I, Jordi You act solely as a scribe for Dr. Dragan Ramirez on this date 06/20/2024

## 2024-07-02 ENCOUNTER — TRANSCRIPTION ENCOUNTER (OUTPATIENT)
Age: 44
End: 2024-07-02

## 2024-07-12 PROBLEM — Z98.890 HISTORY OF EXCISION OF LAMINA OF CERVICAL VERTEBRA FOR DECOMPRESSION OF SPINAL CORD: Status: ACTIVE | Noted: 2024-07-12

## 2024-07-15 ENCOUNTER — APPOINTMENT (OUTPATIENT)
Dept: NEUROSURGERY | Facility: CLINIC | Age: 44
End: 2024-07-15
Payer: MEDICAID

## 2024-07-15 VITALS
RESPIRATION RATE: 18 BRPM | BODY MASS INDEX: 37.39 KG/M2 | DIASTOLIC BLOOD PRESSURE: 77 MMHG | HEART RATE: 83 BPM | HEIGHT: 64 IN | WEIGHT: 219 LBS | OXYGEN SATURATION: 99 % | SYSTOLIC BLOOD PRESSURE: 116 MMHG

## 2024-07-15 DIAGNOSIS — H53.8 OTHER VISUAL DISTURBANCES: ICD-10-CM

## 2024-07-15 DIAGNOSIS — Z98.890 OTHER SPECIFIED POSTPROCEDURAL STATES: ICD-10-CM

## 2024-07-15 PROCEDURE — 99215 OFFICE O/P EST HI 40 MIN: CPT

## 2024-07-18 ENCOUNTER — OUTPATIENT (OUTPATIENT)
Dept: OUTPATIENT SERVICES | Facility: HOSPITAL | Age: 44
LOS: 1 days | End: 2024-07-18

## 2024-07-18 ENCOUNTER — APPOINTMENT (OUTPATIENT)
Dept: MRI IMAGING | Facility: HOSPITAL | Age: 44
End: 2024-07-18
Payer: MEDICAID

## 2024-07-18 DIAGNOSIS — Z98.890 OTHER SPECIFIED POSTPROCEDURAL STATES: Chronic | ICD-10-CM

## 2024-07-18 DIAGNOSIS — Z98.89 OTHER SPECIFIED POSTPROCEDURAL STATES: Chronic | ICD-10-CM

## 2024-07-18 PROCEDURE — 70551 MRI BRAIN STEM W/O DYE: CPT | Mod: 26

## 2024-07-18 PROCEDURE — 70551 MRI BRAIN STEM W/O DYE: CPT

## 2024-08-05 PROBLEM — G93.2 PSEUDOTUMOR CEREBRI: Status: ACTIVE | Noted: 2024-08-05

## 2024-08-20 ENCOUNTER — APPOINTMENT (OUTPATIENT)
Dept: ENDOCRINOLOGY | Facility: CLINIC | Age: 44
End: 2024-08-20
Payer: MEDICAID

## 2024-08-20 ENCOUNTER — APPOINTMENT (OUTPATIENT)
Dept: ENDOCRINOLOGY | Facility: CLINIC | Age: 44
End: 2024-08-20

## 2024-08-20 PROCEDURE — 97802 MEDICAL NUTRITION INDIV IN: CPT

## 2024-08-21 NOTE — ED ADULT NURSE NOTE - NSFALLUNIVINTERV_ED_ALL_ED
Conjuntivae and eyelids appear normal, Sclerae : White without injection
Bed/Stretcher in lowest position, wheels locked, appropriate side rails in place/Call bell, personal items and telephone in reach/Instruct patient to call for assistance before getting out of bed/chair/stretcher/Non-slip footwear applied when patient is off stretcher/[place] to call system/Physically safe environment - no spills, clutter or unnecessary equipment/Purposeful proactive rounding/Room/bathroom lighting operational, light cord in reach

## 2024-08-29 ENCOUNTER — APPOINTMENT (OUTPATIENT)
Dept: ENDOCRINOLOGY | Facility: CLINIC | Age: 44
End: 2024-08-29
Payer: MEDICAID

## 2024-08-29 VITALS
BODY MASS INDEX: 37.59 KG/M2 | WEIGHT: 219 LBS | SYSTOLIC BLOOD PRESSURE: 116 MMHG | DIASTOLIC BLOOD PRESSURE: 69 MMHG | HEART RATE: 86 BPM

## 2024-08-29 DIAGNOSIS — E66.9 OBESITY, UNSPECIFIED: ICD-10-CM

## 2024-08-29 DIAGNOSIS — E28.2 POLYCYSTIC OVARIAN SYNDROME: ICD-10-CM

## 2024-08-29 PROCEDURE — 99214 OFFICE O/P EST MOD 30 MIN: CPT

## 2024-08-29 RX ORDER — PHENTERMINE HYDROCHLORIDE 15 MG/1
15 CAPSULE ORAL
Qty: 30 | Refills: 0 | Status: ACTIVE | COMMUNITY
Start: 2024-08-29 | End: 1900-01-01

## 2024-08-29 NOTE — ASSESSMENT
[Carbohydrate Consistent Diet] : carbohydrate consistent diet [Importance of Diet and Exercise] : importance of diet and exercise to improve glycemic control, achieve weight loss and improve cardiovascular health [Weight Loss] : weight loss

## 2024-09-03 NOTE — PHYSICAL EXAM
[No Acute Distress] : no acute distress [Normal Sclera/Conjunctiva] : normal sclera/conjunctiva [Normal Outer Ear/Nose] : the ears and nose were normal in appearance [Thyroid Not Enlarged] : the thyroid was not enlarged [No Thyroid Nodules] : no palpable thyroid nodules [Clear to Auscultation] : lungs were clear to auscultation bilaterally [Normal Rate] : heart rate was normal [No Edema] : no peripheral edema [Soft] : abdomen soft [Spine Straight] : spine straight [No Stigmata of Cushings Syndrome] : no stigmata of Cushings Syndrome [Normal Gait] : normal gait [Normal Strength/Tone] : muscle strength and tone were normal [No Rash] : no rash [Normal Reflexes] : deep tendon reflexes were 2+ and symmetric [No Tremors] : no tremors [Oriented x3] : oriented to person, place, and time [Kyphosis] : no kyphosis present [Acanthosis Nigricans] : no acanthosis nigricans [de-identified] : No buffalo hump

## 2024-09-03 NOTE — END OF VISIT
[FreeTextEntry3] :  All medical record entries made by the Scribe were at my, Dr. Dragan Ramirez, direction and personally dictated by me on 08/29/2024. I have reviewed the chart and agree that the record accurately reflects my personal performance of the history, physical exam, assessment and plan. I have also personally directed, reviewed and agreed with the chart. [Time Spent: ___ minutes] : I have spent [unfilled] minutes of time on the encounter which excludes teaching and separately reported services.

## 2024-09-03 NOTE — REASON FOR VISIT
[Follow - Up] : a follow-up visit [Weight Management/Obesity] : weight management/obesity [FreeTextEntry2] : CASSANDRA NAYLOR

## 2024-09-03 NOTE — PHYSICAL EXAM
[No Acute Distress] : no acute distress [Normal Sclera/Conjunctiva] : normal sclera/conjunctiva [Normal Outer Ear/Nose] : the ears and nose were normal in appearance [Thyroid Not Enlarged] : the thyroid was not enlarged [No Thyroid Nodules] : no palpable thyroid nodules [Clear to Auscultation] : lungs were clear to auscultation bilaterally [Normal Rate] : heart rate was normal [No Edema] : no peripheral edema [Soft] : abdomen soft [Spine Straight] : spine straight [No Stigmata of Cushings Syndrome] : no stigmata of Cushings Syndrome [Normal Gait] : normal gait [Normal Strength/Tone] : muscle strength and tone were normal [No Rash] : no rash [Normal Reflexes] : deep tendon reflexes were 2+ and symmetric [No Tremors] : no tremors [Oriented x3] : oriented to person, place, and time [Kyphosis] : no kyphosis present [Acanthosis Nigricans] : no acanthosis nigricans [de-identified] : No buffalo hump

## 2024-09-03 NOTE — HISTORY OF PRESENT ILLNESS
[FreeTextEntry1] : 43 year old F pt, with Hx of Obesity, referred by CASSANDRA NAYLOR, FAX [phone] presents today to establish endocrine care. Other PMHx: symptoms of PCOS, prediabetes (09/2021 A1c 5.7%), asthma, Chiari malformation  No PMHx of: fatty liver disease, DAVEY, HTN, depression, HLD PSHx: Hernia repair, removed benign cyst breast, 12/14/23 Suboccipital craniectomy- C1 laminectomy for Chiari Malformation decompression  SHx: No smoking, no EtOH NKDA Not currently working. Eldest child, 2007, second child 2012.  06/20/2024 Pt has BMI 37.59 Pt endorses a steady weight of 145 lbs for most of her life, but started gaining weight in 2012 after her second child. She is currently 219 lbs. She reports eating healthy and has not tried any specific weight loss programs.  Pt endorses fatigue and would like to lose weight in order to improve overall health and prevent possible complications. Recently prescribed 1 week of steroids. Pt states that she was prescribed Metformin for PCOS in the past, and had to discontinue due to GI effects.    08/29/2024  Pt has /77 and BMI 37.59. Pt lost 3 lbs in 2 months. CC: "I'm feeling alright." Pt has 3 children. She states that she does aerobic exercises and walks regularly at home, and has visited an RD. Pt reports that she did not get approved for Wegovy.  Pt last took Metformin 500 mg qd 2-3 years ago and discontinued it due to upset stomach.  [Medications verified as per pt on 08/29/2024] Current Medications: Tylenol, Gabapentin  Medication modified/added this visit: Phentermine 15 mg qd.

## 2024-09-03 NOTE — HISTORY OF PRESENT ILLNESS
[FreeTextEntry1] : 43 year old F pt, with Hx of Obesity, referred by CASSANDRA NAYLOR, FAX [phone] presents today to establish endocrine care. Other PMHx: symptoms of PCOS, prediabetes (09/2021 A1c 5.7%), asthma, Chiari malformation  No PMHx of: fatty liver disease, DAVEY, HTN, depression, HLD PSHx: Hernia repair, removed benign cyst breast, 12/14/23 Suboccipital craniectomy- C1 laminectomy for Chiari Malformation decompression  SHx: No smoking, no EtOH NKDA Not currently working. Eldest child, 2007, second child 2012.  06/20/2024 Pt has BMI 37.59 Pt endorses a steady weight of 145 lbs for most of her life, but started gaining weight in 2012 after her second child. She is currently 219 lbs. She reports eating healthy and has not tried any specific weight loss programs.  Pt endorses fatigue and would like to lose weight in order to improve overall health and prevent possible complications. Recently prescribed 1 week of steroids. Pt states that she was prescribed Metformin for PCOS in the past, and had to discontinue due to GI effects.    08/29/2024  Pt has /77 and BMI 37.59. Pt lost 3 lbs in 2 months. CC: "I'm feeling alright." Pt has 3 children. She states that she does aerobic exercises and walks regularly at home, and has visited an RD. Pt reports that she did not get approved for Wegovy.  Pt last took Metformin 500 mg qd 2-3 years ago and discontinued it due to upset stomach.  [Medications verified as per pt on 08/29/2024] Current Medications: Tylenol, Gabapentin  Medication modified/added this visit: Phentermine 15 mg qd.

## 2024-09-03 NOTE — ADDENDUM
[FreeTextEntry1] : I, Jordi You act solely as a scribe for Dr. Dragan Ramirez on this date 08/29/2024

## 2024-09-06 ENCOUNTER — APPOINTMENT (OUTPATIENT)
Dept: ENDOCRINOLOGY | Facility: CLINIC | Age: 44
End: 2024-09-06

## 2024-09-20 ENCOUNTER — APPOINTMENT (OUTPATIENT)
Dept: OPHTHALMOLOGY | Facility: CLINIC | Age: 44
End: 2024-09-20
Payer: MEDICAID

## 2024-09-20 ENCOUNTER — NON-APPOINTMENT (OUTPATIENT)
Age: 44
End: 2024-09-20

## 2024-09-20 PROCEDURE — 92004 COMPRE OPH EXAM NEW PT 1/>: CPT

## 2024-09-20 PROCEDURE — 92083 EXTENDED VISUAL FIELD XM: CPT

## 2024-09-23 ENCOUNTER — EMERGENCY (EMERGENCY)
Facility: HOSPITAL | Age: 44
LOS: 1 days | Discharge: HOME | End: 2024-09-23
Attending: STUDENT IN AN ORGANIZED HEALTH CARE EDUCATION/TRAINING PROGRAM | Admitting: STUDENT IN AN ORGANIZED HEALTH CARE EDUCATION/TRAINING PROGRAM
Payer: COMMERCIAL

## 2024-09-23 VITALS
OXYGEN SATURATION: 99 % | HEART RATE: 78 BPM | RESPIRATION RATE: 18 BRPM | SYSTOLIC BLOOD PRESSURE: 115 MMHG | WEIGHT: 214.95 LBS | TEMPERATURE: 99 F | DIASTOLIC BLOOD PRESSURE: 74 MMHG

## 2024-09-23 DIAGNOSIS — M25.511 PAIN IN RIGHT SHOULDER: ICD-10-CM

## 2024-09-23 DIAGNOSIS — Z88.5 ALLERGY STATUS TO NARCOTIC AGENT: ICD-10-CM

## 2024-09-23 DIAGNOSIS — Z98.890 OTHER SPECIFIED POSTPROCEDURAL STATES: Chronic | ICD-10-CM

## 2024-09-23 DIAGNOSIS — Z98.89 OTHER SPECIFIED POSTPROCEDURAL STATES: Chronic | ICD-10-CM

## 2024-09-23 PROCEDURE — 99284 EMERGENCY DEPT VISIT MOD MDM: CPT

## 2024-09-24 VITALS
OXYGEN SATURATION: 97 % | SYSTOLIC BLOOD PRESSURE: 101 MMHG | RESPIRATION RATE: 19 BRPM | DIASTOLIC BLOOD PRESSURE: 72 MMHG | HEART RATE: 73 BPM

## 2024-09-24 PROCEDURE — 73030 X-RAY EXAM OF SHOULDER: CPT

## 2024-09-24 PROCEDURE — 73030 X-RAY EXAM OF SHOULDER: CPT | Mod: 26,RT

## 2024-09-24 PROCEDURE — 99283 EMERGENCY DEPT VISIT LOW MDM: CPT | Mod: 25

## 2024-09-24 PROCEDURE — 96372 THER/PROPH/DIAG INJ SC/IM: CPT

## 2024-09-24 RX ORDER — ACETAMINOPHEN 325 MG/1
1000 TABLET ORAL ONCE
Refills: 0 | Status: COMPLETED | OUTPATIENT
Start: 2024-09-24 | End: 2024-09-24

## 2024-09-24 RX ORDER — IBUPROFEN 600 MG
1 TABLET ORAL
Qty: 15 | Refills: 0
Start: 2024-09-24 | End: 2024-09-28

## 2024-09-24 RX ORDER — METHOCARBAMOL 750 MG/1
2 TABLET, FILM COATED ORAL
Qty: 42 | Refills: 0
Start: 2024-09-24 | End: 2024-09-30

## 2024-09-24 RX ORDER — METHOCARBAMOL 750 MG/1
1000 TABLET, FILM COATED ORAL ONCE
Refills: 0 | Status: COMPLETED | OUTPATIENT
Start: 2024-09-24 | End: 2024-09-24

## 2024-09-24 RX ORDER — LIDOCAINE/BENZALKONIUM/ALCOHOL
1 SOLUTION, NON-ORAL TOPICAL ONCE
Refills: 0 | Status: COMPLETED | OUTPATIENT
Start: 2024-09-24 | End: 2024-09-24

## 2024-09-24 RX ORDER — KETOROLAC TROMETHAMINE 30 MG/ML
15 INJECTION, SOLUTION INTRAMUSCULAR ONCE
Refills: 0 | Status: DISCONTINUED | OUTPATIENT
Start: 2024-09-24 | End: 2024-09-24

## 2024-09-24 RX ORDER — ACETAMINOPHEN 325 MG/1
2 TABLET ORAL
Qty: 50 | Refills: 0
Start: 2024-09-24

## 2024-09-24 RX ADMIN — KETOROLAC TROMETHAMINE 15 MILLIGRAM(S): 30 INJECTION, SOLUTION INTRAMUSCULAR at 01:13

## 2024-09-24 RX ADMIN — METHOCARBAMOL 1000 MILLIGRAM(S): 750 TABLET, FILM COATED ORAL at 01:13

## 2024-09-24 RX ADMIN — Medication 1 PATCH: at 02:15

## 2024-09-24 RX ADMIN — ACETAMINOPHEN 1000 MILLIGRAM(S): 325 TABLET ORAL at 02:15

## 2024-09-24 NOTE — ED PROVIDER NOTE - PHYSICAL EXAMINATION
CONST: nontoxic NAD speaking in full sentences  HEAD: atraumatic  EYES: conjunctivae clear  NECK: supple  CARD: regular rate  CHEST: breathing comfortably, no stridor/retractions/tripoding  EXT: no gross deformity to the right upper extremity  + Mild TTP over the right scapula and anterior axillary region.  FROM, patient able to lift arm/shoulder above her head.  2+ pulses, 5/5 strength throughout RUE, SILT  SKIN: warm, dry  NEURO: awake alert answering questions following commands moving all extremities

## 2024-09-24 NOTE — ED PROVIDER NOTE - OBJECTIVE STATEMENT
43-year-old female with history of Chiari malformation s/p surgery, fibroadenoma s/p removal in the left breast, comes in for evaluation.  Patient states over the last 1 week she has been having pain that started by the right scapula and now she feels it all around the right scapula, right shoulder, occasionally shooting down the right arm.  No numbness or weakness.  No falls or traumas.  No chest pain or shortness of breath.

## 2024-09-24 NOTE — ED PROVIDER NOTE - PATIENT PORTAL LINK FT
You can access the FollowMyHealth Patient Portal offered by [facility] by registering at the following website: [email or web address]. By joining [employer]’s FollowMyHealth portal, you will also be able to view your health information using other applications (apps) compatible with our system.

## 2024-09-24 NOTE — ED PROVIDER NOTE - NSFOLLOWUPINSTRUCTIONS_ED_ALL_ED_FT
Please follow up with your primary doctor for further evaluation and management of your back and shoulder pain.   Take all medications prescribed as needed for pain  Rest the affected area  You can apply ice or heat as needed

## 2024-09-24 NOTE — ED PROVIDER NOTE - CLINICAL SUMMARY MEDICAL DECISION MAKING FREE TEXT BOX
Triage VS normal  On exam patient with no gross deformity to the right upper extremity  + Mild TTP over the right scapula and anterior axillary region.  FROM, patient able to lift arm/shoulder above her head.  2+ pulses, 5/5 strength throughout RUE, SILT    Likely MSK pain, doubt fracture or dislocation plan for x-ray, pain control, anticipate DC

## 2024-10-10 PROBLEM — R51.9 HEADACHE: Status: ACTIVE | Noted: 2018-11-05

## 2024-10-14 ENCOUNTER — APPOINTMENT (OUTPATIENT)
Dept: NEUROSURGERY | Facility: CLINIC | Age: 44
End: 2024-10-14

## 2024-10-14 DIAGNOSIS — G93.0 CEREBRAL CYSTS: ICD-10-CM

## 2024-10-14 DIAGNOSIS — G93.5 COMPRESSION OF BRAIN: ICD-10-CM

## 2024-10-14 DIAGNOSIS — R51.9 HEADACHE, UNSPECIFIED: ICD-10-CM

## 2024-11-04 ENCOUNTER — APPOINTMENT (OUTPATIENT)
Dept: NEUROSURGERY | Facility: CLINIC | Age: 44
End: 2024-11-04
Payer: MEDICAID

## 2024-11-04 VITALS
RESPIRATION RATE: 18 BRPM | HEIGHT: 64 IN | OXYGEN SATURATION: 98 % | HEART RATE: 60 BPM | WEIGHT: 219 LBS | DIASTOLIC BLOOD PRESSURE: 75 MMHG | SYSTOLIC BLOOD PRESSURE: 111 MMHG | BODY MASS INDEX: 37.39 KG/M2

## 2024-11-04 DIAGNOSIS — Z87.39 PERSONAL HISTORY OF OTHER DISEASES OF THE MUSCULOSKELETAL SYSTEM AND CONNECTIVE TISSUE: ICD-10-CM

## 2024-11-04 DIAGNOSIS — M54.2 CERVICALGIA: ICD-10-CM

## 2024-11-04 PROCEDURE — 99215 OFFICE O/P EST HI 40 MIN: CPT

## 2024-11-11 ENCOUNTER — NON-APPOINTMENT (OUTPATIENT)
Age: 44
End: 2024-11-11

## 2024-11-13 ENCOUNTER — APPOINTMENT (OUTPATIENT)
Dept: GASTROENTEROLOGY | Facility: CLINIC | Age: 44
End: 2024-11-13
Payer: MEDICAID

## 2024-11-13 ENCOUNTER — NON-APPOINTMENT (OUTPATIENT)
Age: 44
End: 2024-11-13

## 2024-11-13 VITALS
WEIGHT: 217.8 LBS | SYSTOLIC BLOOD PRESSURE: 118 MMHG | RESPIRATION RATE: 18 BRPM | DIASTOLIC BLOOD PRESSURE: 72 MMHG | HEIGHT: 64 IN | HEART RATE: 93 BPM | TEMPERATURE: 96.6 F | BODY MASS INDEX: 37.18 KG/M2 | OXYGEN SATURATION: 99 %

## 2024-11-13 DIAGNOSIS — K21.9 GASTRO-ESOPHAGEAL REFLUX DISEASE W/OUT ESOPHAGITIS: ICD-10-CM

## 2024-11-13 PROCEDURE — 99204 OFFICE O/P NEW MOD 45 MIN: CPT

## 2024-11-29 ENCOUNTER — APPOINTMENT (OUTPATIENT)
Dept: ENDOCRINOLOGY | Facility: CLINIC | Age: 44
End: 2024-11-29

## 2024-12-06 ENCOUNTER — RESULT REVIEW (OUTPATIENT)
Age: 44
End: 2024-12-06

## 2024-12-06 ENCOUNTER — APPOINTMENT (OUTPATIENT)
Dept: ULTRASOUND IMAGING | Facility: HOSPITAL | Age: 44
End: 2024-12-06
Payer: MEDICAID

## 2024-12-06 ENCOUNTER — OUTPATIENT (OUTPATIENT)
Dept: OUTPATIENT SERVICES | Facility: HOSPITAL | Age: 44
LOS: 1 days | End: 2024-12-06

## 2024-12-06 DIAGNOSIS — Z98.890 OTHER SPECIFIED POSTPROCEDURAL STATES: Chronic | ICD-10-CM

## 2024-12-06 DIAGNOSIS — Z98.89 OTHER SPECIFIED POSTPROCEDURAL STATES: Chronic | ICD-10-CM

## 2024-12-06 PROCEDURE — 88305 TISSUE EXAM BY PATHOLOGIST: CPT

## 2024-12-06 PROCEDURE — 88305 TISSUE EXAM BY PATHOLOGIST: CPT | Mod: 26

## 2024-12-06 PROCEDURE — 19083 BX BREAST 1ST LESION US IMAG: CPT | Mod: LT

## 2024-12-06 PROCEDURE — A4648: CPT

## 2024-12-06 PROCEDURE — 77065 DX MAMMO INCL CAD UNI: CPT | Mod: 26,LT

## 2024-12-06 PROCEDURE — 19083 BX BREAST 1ST LESION US IMAG: CPT

## 2024-12-06 PROCEDURE — 77065 DX MAMMO INCL CAD UNI: CPT

## 2024-12-09 ENCOUNTER — NON-APPOINTMENT (OUTPATIENT)
Age: 44
End: 2024-12-09

## 2025-01-02 ENCOUNTER — NON-APPOINTMENT (OUTPATIENT)
Age: 45
End: 2025-01-02

## 2025-01-08 ENCOUNTER — APPOINTMENT (OUTPATIENT)
Dept: ENDOCRINOLOGY | Facility: CLINIC | Age: 45
End: 2025-01-08

## 2025-01-14 ENCOUNTER — APPOINTMENT (OUTPATIENT)
Dept: BREAST CENTER | Facility: CLINIC | Age: 45
End: 2025-01-14

## 2025-01-22 ENCOUNTER — NON-APPOINTMENT (OUTPATIENT)
Age: 45
End: 2025-01-22

## 2025-02-20 ENCOUNTER — NON-APPOINTMENT (OUTPATIENT)
Age: 45
End: 2025-02-20

## 2025-02-25 ENCOUNTER — APPOINTMENT (OUTPATIENT)
Dept: BREAST CENTER | Facility: CLINIC | Age: 45
End: 2025-02-25
Payer: MEDICAID

## 2025-02-25 ENCOUNTER — NON-APPOINTMENT (OUTPATIENT)
Age: 45
End: 2025-02-25

## 2025-02-25 VITALS
OXYGEN SATURATION: 100 % | HEIGHT: 64 IN | WEIGHT: 211 LBS | SYSTOLIC BLOOD PRESSURE: 118 MMHG | BODY MASS INDEX: 36.02 KG/M2 | HEART RATE: 81 BPM | DIASTOLIC BLOOD PRESSURE: 67 MMHG

## 2025-02-25 DIAGNOSIS — R92.30 DENSE BREASTS, UNSPECIFIED: ICD-10-CM

## 2025-02-25 DIAGNOSIS — N63.20 UNSPECIFIED LUMP IN THE RIGHT BREAST, UNSPECIFIED QUADRANT: ICD-10-CM

## 2025-02-25 DIAGNOSIS — N63.0 UNSPECIFIED LUMP IN UNSPECIFIED BREAST: ICD-10-CM

## 2025-02-25 DIAGNOSIS — R92.8 OTHER ABNORMAL AND INCONCLUSIVE FINDINGS ON DIAGNOSTIC IMAGING OF BREAST: ICD-10-CM

## 2025-02-25 DIAGNOSIS — N63.10 UNSPECIFIED LUMP IN THE RIGHT BREAST, UNSPECIFIED QUADRANT: ICD-10-CM

## 2025-02-25 DIAGNOSIS — D24.2 BENIGN NEOPLASM OF LEFT BREAST: ICD-10-CM

## 2025-02-25 PROCEDURE — 99204 OFFICE O/P NEW MOD 45 MIN: CPT

## 2025-03-14 ENCOUNTER — APPOINTMENT (OUTPATIENT)
Dept: BREAST CENTER | Facility: CLINIC | Age: 45
End: 2025-03-14

## 2025-03-18 ENCOUNTER — APPOINTMENT (OUTPATIENT)
Dept: ENDOCRINOLOGY | Facility: CLINIC | Age: 45
End: 2025-03-18

## 2025-03-19 ENCOUNTER — NON-APPOINTMENT (OUTPATIENT)
Age: 45
End: 2025-03-19

## 2025-03-25 ENCOUNTER — APPOINTMENT (OUTPATIENT)
Dept: BREAST CENTER | Facility: CLINIC | Age: 45
End: 2025-03-25
Payer: MEDICAID

## 2025-03-25 VITALS — BODY MASS INDEX: 36.54 KG/M2 | HEIGHT: 64 IN | WEIGHT: 214 LBS

## 2025-03-25 DIAGNOSIS — D24.2 BENIGN NEOPLASM OF LEFT BREAST: ICD-10-CM

## 2025-03-25 DIAGNOSIS — R92.30 DENSE BREASTS, UNSPECIFIED: ICD-10-CM

## 2025-03-25 DIAGNOSIS — R92.8 OTHER ABNORMAL AND INCONCLUSIVE FINDINGS ON DIAGNOSTIC IMAGING OF BREAST: ICD-10-CM

## 2025-03-25 PROCEDURE — 99214 OFFICE O/P EST MOD 30 MIN: CPT

## 2025-04-30 ENCOUNTER — NON-APPOINTMENT (OUTPATIENT)
Age: 45
End: 2025-04-30

## 2025-05-12 ENCOUNTER — NON-APPOINTMENT (OUTPATIENT)
Age: 45
End: 2025-05-12

## 2025-05-14 ENCOUNTER — APPOINTMENT (OUTPATIENT)
Age: 45
End: 2025-05-14
Payer: MEDICAID

## 2025-05-14 ENCOUNTER — APPOINTMENT (OUTPATIENT)
Dept: MAMMOGRAPHY | Facility: CLINIC | Age: 45
End: 2025-05-14
Payer: MEDICAID

## 2025-05-14 ENCOUNTER — OUTPATIENT (OUTPATIENT)
Dept: OUTPATIENT SERVICES | Facility: HOSPITAL | Age: 45
LOS: 1 days | End: 2025-05-14

## 2025-05-14 ENCOUNTER — RESULT REVIEW (OUTPATIENT)
Age: 45
End: 2025-05-14

## 2025-05-14 ENCOUNTER — APPOINTMENT (OUTPATIENT)
Dept: ULTRASOUND IMAGING | Facility: CLINIC | Age: 45
End: 2025-05-14
Payer: MEDICAID

## 2025-05-14 DIAGNOSIS — Z98.89 OTHER SPECIFIED POSTPROCEDURAL STATES: Chronic | ICD-10-CM

## 2025-05-14 DIAGNOSIS — Z98.890 OTHER SPECIFIED POSTPROCEDURAL STATES: Chronic | ICD-10-CM

## 2025-05-14 PROCEDURE — 19285 PERQ DEV BREAST 1ST US IMAG: CPT | Mod: LT

## 2025-05-14 PROCEDURE — 76098 X-RAY EXAM SURGICAL SPECIMEN: CPT | Mod: 26

## 2025-05-14 PROCEDURE — 88307 TISSUE EXAM BY PATHOLOGIST: CPT | Mod: 26

## 2025-05-14 PROCEDURE — 19125 EXCISION BREAST LESION: CPT | Mod: LT

## 2025-05-15 ENCOUNTER — RESULT REVIEW (OUTPATIENT)
Age: 45
End: 2025-05-15

## 2025-05-15 PROCEDURE — 76098 X-RAY EXAM SURGICAL SPECIMEN: CPT | Mod: 26

## 2025-05-16 ENCOUNTER — APPOINTMENT (OUTPATIENT)
Dept: NEUROSURGERY | Facility: CLINIC | Age: 45
End: 2025-05-16

## 2025-05-16 DIAGNOSIS — G93.5 COMPRESSION OF BRAIN: ICD-10-CM

## 2025-05-16 DIAGNOSIS — T14.8XXA OTHER INJURY OF UNSPECIFIED BODY REGION, INITIAL ENCOUNTER: ICD-10-CM

## 2025-05-16 DIAGNOSIS — L08.9 OTHER INJURY OF UNSPECIFIED BODY REGION, INITIAL ENCOUNTER: ICD-10-CM

## 2025-05-21 ENCOUNTER — NON-APPOINTMENT (OUTPATIENT)
Age: 45
End: 2025-05-21

## 2025-05-23 ENCOUNTER — APPOINTMENT (OUTPATIENT)
Dept: NEUROSURGERY | Facility: CLINIC | Age: 45
End: 2025-05-23

## 2025-05-23 VITALS
RESPIRATION RATE: 18 BRPM | TEMPERATURE: 97.6 F | HEIGHT: 64 IN | HEART RATE: 68 BPM | DIASTOLIC BLOOD PRESSURE: 71 MMHG | BODY MASS INDEX: 36.7 KG/M2 | WEIGHT: 215 LBS | OXYGEN SATURATION: 99 % | SYSTOLIC BLOOD PRESSURE: 105 MMHG

## 2025-05-23 PROCEDURE — 99215 OFFICE O/P EST HI 40 MIN: CPT

## 2025-05-27 ENCOUNTER — APPOINTMENT (OUTPATIENT)
Dept: BREAST CENTER | Facility: CLINIC | Age: 45
End: 2025-05-27
Payer: MEDICAID

## 2025-05-27 DIAGNOSIS — D24.2 BENIGN NEOPLASM OF LEFT BREAST: ICD-10-CM

## 2025-05-27 DIAGNOSIS — Z98.890 OTHER SPECIFIED POSTPROCEDURAL STATES: ICD-10-CM

## 2025-05-27 PROCEDURE — 99024 POSTOP FOLLOW-UP VISIT: CPT

## 2025-05-27 NOTE — ED PROVIDER NOTE - NS_BEDUNITTYPES_ED_ALL_ED
Physical Therapy    Visit Type: initial evaluation and treatment    Relevant History/Co-morbidities: Patient admitted s/p fall at home    PMH: asthma, hypertension, diabetes mellitus, macular degeneration, breast cancer    SUBJECTIVE  Patient received in bed and agreeable to PT.  \"I have a chair lift for the stairs.  I put a box on the lift and tried to climb the stairs myself instead when I fell.\"  Patient / Family Goal: return home and return to previous functional status    Pain   RN informed on pain level.    Location: tailbone, R buttocks/lower back    At onset of session (out of 10): 1     OBJECTIVE     Cognitive Status   Level of Consciousness   - alert  Arousal Alertness   - appropriate responses to stimuli  Affect/Behavior    - cooperative  Orientation    - Oriented to: person, place, time and situation  Functional Communication   - Forms of Communication: verbal  Attention Span    - Attention: - attends with cues to redirect   - Attention impairment: fatigue and internal factors  Following Direction   - follows one step commands consistently  Transition Between Tasks   - transitions with cues  Memory   - - decreased recall of biographical information  Safety Awareness/Insight   - decreased awareness of need for safety and decreased awareness of need for assistance  Awareness of Deficits   - assistance required to compensate for deficits and decreased awareness of deficits  Patient provided inconsistent reports of home DME equipment and use throughout session.     Vitals:  Pulse Ox (%): 93%  Heart Rate (beats per minute): 71-83  Blood Pressure (mmhg):      - Supine: 140/66     - Seated: 146/63  BP post ambulation in chair: 140/54    Patient Activity Tolerance: 1 to 1 activity to rest    Observation   Patient donned socks while edge of bed without assistance using figure 4 technique    Range of Motion (ROM)   (degrees unless noted; active unless noted; norms in ( ); negative=lacking to 0, positive=beyond  0)  WFL: LUE, RUE  WFL: LLE, RLE    Strength  (out of 5 unless noted, standard test position unless noted)   WFL: LUE, RUE  WFL: LLE, RLE      Sitting Balance  (JEF = base of support)  Static      - Trial 1 details: supervision, with double LE support and with back unsupported    Standing Balance  (JEF = base of support)  Firm Surface: Double Leg      - Static, Eyes Open       - Trial 1 details: with verbal cues, without UE support and contact guard       - Trial 2 details: stand by assist, with double UE support and with verbal cues (via 2-wheeled walker)     - Dynamic, Eyes Open       - Trial 1 details: with tactle cues, with verbal cues, with double UE support and contact guard (via 2-wheeled walker)       Bed Mobility  - Rolling left: stand by assist, with verbal cues  - Side-lying to sit: stand by assist, with verbal cues  Patient educated on log roll technique due to low back pain. Verbal cues for technique and positioning  Transfers  Assistive devices: 2-wheeled walker, gait belt  - Sit to stand: with verbal cues, contact guard/touching/steadying assist       - Second Trial: stand by assist, with verbal cues  - Stand to sit: with verbal cues, contact guard/touching/steadying assist  - Stand pivot: with verbal cues, contact guard/touching/steadying assist  Verbal cues for safe hand placement and positioning  Sit to stand x 3 reps progressed to stand by assist from chair by trial 3    Ambulation / Gait  - Assistive device: two-wheeled walker and gait belt  - Distance (feet unless otherwise indicated): 80  - Assist Level: with tactile cues, with verbal cues and contact guard/touching/steadying assist  - Surface: even  - Description: decreased step length left, decreased step length right, decreased fabio/pace and narrow base of support    Cues for increased bilateral step length and foot clearance to improve gait mechanics using 2-wheeled walker.  PT recommends use of 2-wheeled walker for balance, safety, and  to reduce risk of falls. Patient verbalized understanding.   Stair Ambulation  - Number of steps: 4;    - Step height (inches): 6  - Assist Level: contact guard/touching/steadying assist, with verbal cues and with tactile cues  - Railin hands on one rail  - Pattern: step to  Simulated stair negotiation using 6-inch step stoop, BUE support on bedrail, CGA for balance/safety.  Verbal cues for technique and pattern to lead with left LE during ascent and right LE during descent.  Patient reports she owns chair lift for interior stairs.       Interventions     Training provided: activity tolerance, balance retraining, bed mobility training, stair training, transfer training, positioning, functional ambulation, energy conservation, gait training, HEP training, use of assistive device, safety training and body mechanics  Waffle cushion placed on chair to improve comfort and sitting tolerance due to reports of tailbone and low back pain.  Patient reports decreased pain to 3/10 when using waffle cushion.   Skilled input: Verbal instruction/cues, tactile instruction/cues and posture correction  Verbal Consent: Writer verbally educated and received verbal consent for hand placement, positioning of patient, and techniques to be performed today from patient for hand placement and palpation for techniques, clothing adjustments for techniques and therapist position for techniques as described above and how they are pertinent to the patient's plan of care.         Education:   - Present and ready to learn: patient  Education provided during session:  - gait training, positioning, transfer technique, role of PT, safety, use of assistive device, proper body mechanics, pain management, fall prevention, bed mobility techniques and energy conservation  - Results of above outlined education: Verbalizes understanding and Needs reinforcement    ASSESSMENT   Patient will benefit from skilled therapy to address listed impairments and  functional limitations.  Interfering components: decreased activity tolerance and decreased insight into deficit    Discharge needs based on today's assessment:   - Current level of function: slightly below baseline level of function   - Therapy needs at discharge: therapy 1-3 times per week   - Activities of daily living (ADLs) requiring support at discharge: bed mobility, transfers, ambulation and stairs   - Instrumental activities of daily living (IADLs) requiring support at discharge: community mobility, shopping and home management   - Impairments that require further therapy intervention: activity tolerance, balance, safety awareness, pain and strength    AM-PAC  - Generalized Prior Level of Function: Needs a little help ([facility] 12-21) (Patient appears to be AM-PAC of 21 at baseline)       Key: MOD A=moderate assistance, IND/MOD I=independent/modified independent  - Generalized Current Level of Function     - Current Mobility Score: 18       AM-PAC Scoring Key= >21 Modified Independent; 20-21 Supervision; 18-19 Minimal assist; 13-17 Moderate assist; 9-12 Max assist; <9 Total assist    [facility] Daily Activities Screen:    1. How much help is needed to eat meals? None (4)    2. How much help is needed to take care of personal grooming such as brushing teeth?  None (4)    3. How much help is needed for bathing (including washing, rinsing, and drying)?  A little (3)    4. How much help is needed to put on and take off regular upper body clothing?  None (4)    5. How much help is needed to put on and take off regular lower body clothing?  None (4)    6. How much help is needed for toileting, which includes using the toilet, bedpan or urinal?  A little (3)    AM-PAC Activities Screen:  Activities Score: 22/24        Predicted patient presentation: Low (stable) - Patient comorbidities and complexities, as defined above, will have little effect on progress for prescribed plan of care.    Pain at End of Session: RN  informed on pain level  Pain: 3/10, location: tailbone, lower back    PLAN (while hospitalized)  Suggestions for next session as indicated: Increase safety and independence with all functional mobility, increase gait distance    PT Frequency: 3-5 x per week      PT/OT Mobility Equipment for Discharge: 2-wheeled walker     Interventions: balance, body mechanics, compensatory technique education, energy conservation, gait training, strengthening, safety education, stairs retraining, functional transfer training, endurance training, bed mobility, equipment eval/education and HEP train/position  Agreement to plan and goals: patient agrees with goals and treatment plan        GOALS  Long Term Goals: (to be met by time of discharge from hospital)  Rolling left: Patient will complete bed mobility for rolling left modified independent.  Rolling right: Patient will complete bed mobility for rolling right modified independent.  Sidelying to sit: Patient will complete bed mobility for sidelying to sit modified independent.  Sit to sidelying: Patient will complete bed mobility for sit to sidelying modified independent.  Sit to stand: Patient will complete sit to stand transfer with 2-wheeled walker, modified independent.   Stand to sit: Patient will complete stand to sit transfer with 2-wheeled walker, modified independent.   Stand pivot: Patient will complete stand pivot transfer with 2-wheeled walker, supervision.   Ambulation (even): Patient will ambulate on even surface for 150 feet with 2-wheeled walker, supervision.   3-4 steps: Patient will ambulate 3-4 steps with using one rail, supervision.   Documented in the chart in the following areas:  Prior Function. Assessment/Plan.       Patient at End of Session:   Location: in chair  Safety measures: alarm system in place/re-engaged, call light within reach, equipment intact and lines intact  Handoff to: nurse and nurse assistant        Therapy procedure time and total  treatment time can be found documented on the Time Entry flowsheet   REGIONAL

## 2025-06-03 ENCOUNTER — NON-APPOINTMENT (OUTPATIENT)
Age: 45
End: 2025-06-03

## 2025-06-03 ENCOUNTER — APPOINTMENT (OUTPATIENT)
Dept: GYNECOLOGIC ONCOLOGY | Facility: CLINIC | Age: 45
End: 2025-06-03
Payer: MEDICAID

## 2025-06-03 VITALS
WEIGHT: 215 LBS | DIASTOLIC BLOOD PRESSURE: 73 MMHG | HEIGHT: 64 IN | HEART RATE: 87 BPM | TEMPERATURE: 97.3 F | OXYGEN SATURATION: 98 % | BODY MASS INDEX: 36.7 KG/M2 | SYSTOLIC BLOOD PRESSURE: 110 MMHG

## 2025-06-03 PROCEDURE — 99459 PELVIC EXAMINATION: CPT

## 2025-06-03 PROCEDURE — 99214 OFFICE O/P EST MOD 30 MIN: CPT

## 2025-06-24 NOTE — OCCUPATIONAL THERAPY INITIAL EVALUATION ADULT - PHYSICAL ASSIST/NONPHYSICAL ASSIST:DRESS LOWER BODY, OT EVAL
Problem: Pain  Goal: Acceptable pain level achieved/maintained at rest using appropriate pain scale for the patient  Outcome: Monitoring/Evaluating progress  Goal: Acceptable pain level achieved/maintained with activity using appropriate pain scale for the patient  Outcome: Monitoring/Evaluating progress  Goal: Acceptable pain level achieved/maintained without oversedation  Outcome: Monitoring/Evaluating progress     Problem: At Risk for Falls  Goal: Patient does not fall  Outcome: Monitoring/Evaluating progress  Goal: Patient takes action to control fall-related risks  Outcome: Monitoring/Evaluating progress     Problem: At Risk for Injury Due to Fall  Goal: Patient does not fall  Outcome: Monitoring/Evaluating progress  Goal: Takes action to control condition specific risks  Outcome: Monitoring/Evaluating progress  Goal: Verbalizes understanding of fall-related injury personal risks  Description: Document education using the patient education activity  Outcome: Monitoring/Evaluating progress      verbal cues

## (undated) DEVICE — Device

## (undated) DEVICE — FOLEY TRAY 16FR 5CC LF UMETER CLOSED

## (undated) DEVICE — PACK CRANIOTOMY LNX SURGICOUNT

## (undated) DEVICE — MIDAS REX MR8 MATCH HEAD FLUTED LG BORE 3MM X 14CM

## (undated) DEVICE — PACK SPINE

## (undated) DEVICE — SUT VICRYL PLUS 2-0 18" CT-2 (POP-OFF)

## (undated) DEVICE — APPLICATOR SKIN PREP CHG 3CC

## (undated) DEVICE — BIPOLAR FORCEP SYMMETRY BAYONET 7" X 1.5MM SMOOTH (SILVER)

## (undated) DEVICE — SUT NUROLON 4-0 8-18" TF (POP-OFF)

## (undated) DEVICE — SPONGE SURGICAL STRIP 1/4 X 6"

## (undated) DEVICE — TAPE SILK 3"

## (undated) DEVICE — DRAPE MICROSCOPE EXOSCOPE 12" X 79"

## (undated) DEVICE — POSITIONER FOAM EGG CRATE ULNAR 2PCS (PINK)

## (undated) DEVICE — VENODYNE/SCD SLEEVE CALF MEDIUM

## (undated) DEVICE — SUT NYLON 3-0 18" PS-2

## (undated) DEVICE — WARMING BLANKET FULL UNDERBODY

## (undated) DEVICE — DRSG MASTISOL

## (undated) DEVICE — MARKING PEN W RULER

## (undated) DEVICE — DRAPE SURGICAL #1010

## (undated) DEVICE — DRAPE C ARM BANDED SHOWER BAG

## (undated) DEVICE — SUT VICRYL 0 18" CT-2 (POP-OFF)

## (undated) DEVICE — GLV 8 PROTEXIS (WHITE)

## (undated) DEVICE — SPONGE SURGICAL STRIP 1/2 X 6"

## (undated) DEVICE — SPONGE SURGICAL STRIP 1" X 6"

## (undated) DEVICE — NDL SPINAL 18G X 3.5" (PINK)

## (undated) DEVICE — DRAPE C ARM 41X74"

## (undated) DEVICE — BIPOLAR FORCEP GILLEN SURGICAL NXT TIP 8" X 0.4MM

## (undated) DEVICE — MIDAS REX MR8 BALL FLUTED LG BORE 3MM X 9CM

## (undated) DEVICE — BIPOLAR FORCEP GILLEN SURGICAL NXT TIP 7" X 0.4MM

## (undated) DEVICE — MIDAS REX MR8 BALL FLUTED LG BORE 4MM X 9CM

## (undated) DEVICE — STRYKER BONE MILL BLADE FINE 3.2MM